# Patient Record
Sex: MALE | Race: WHITE | Employment: OTHER | URBAN - METROPOLITAN AREA
[De-identification: names, ages, dates, MRNs, and addresses within clinical notes are randomized per-mention and may not be internally consistent; named-entity substitution may affect disease eponyms.]

---

## 2010-06-08 LAB — PSA, EXTERNAL: 1.5

## 2017-01-11 DIAGNOSIS — R80.9 PROTEINURIA: Primary | ICD-10-CM

## 2017-01-16 LAB
ALBUMIN MFR UR ELPH: 59.7 %
ALPHA1 GLOB MFR UR ELPH: 1.2 %
ALPHA2 GLOB MFR UR ELPH: 7.3 %
B-GLOBULIN MFR UR ELPH: 13.1 %
GAMMA GLOB MFR UR ELPH: 18.5 %
M PROTEIN MFR UR ELPH: 3.3 %
PLEASE NOTE:, 133800: ABNORMAL
PROT UR-MCNC: 42.4 MG/DL

## 2017-01-31 RX ORDER — FLUTICASONE PROPIONATE 50 MCG
SPRAY, SUSPENSION (ML) NASAL
Qty: 16 G | Refills: 11 | Status: SHIPPED | OUTPATIENT
Start: 2017-01-31 | End: 2017-11-01 | Stop reason: SDUPTHER

## 2017-02-06 ENCOUNTER — OFFICE VISIT (OUTPATIENT)
Dept: ONCOLOGY | Age: 82
End: 2017-02-06

## 2017-02-06 ENCOUNTER — HOSPITAL ENCOUNTER (OUTPATIENT)
Dept: LAB | Age: 82
Discharge: HOME | End: 2017-02-06
Payer: MEDICARE

## 2017-02-06 VITALS
SYSTOLIC BLOOD PRESSURE: 144 MMHG | HEIGHT: 71 IN | DIASTOLIC BLOOD PRESSURE: 75 MMHG | RESPIRATION RATE: 18 BRPM | OXYGEN SATURATION: 97 % | TEMPERATURE: 98.5 F | HEART RATE: 79 BPM | BODY MASS INDEX: 26.07 KG/M2 | WEIGHT: 186.2 LBS

## 2017-02-06 DIAGNOSIS — R77.8 ABNORMAL SERUM PROTEIN ELECTROPHORESIS: ICD-10-CM

## 2017-02-06 DIAGNOSIS — R77.8 ABNORMAL SERUM PROTEIN ELECTROPHORESIS: Primary | ICD-10-CM

## 2017-02-06 PROCEDURE — 83883 ASSAY NEPHELOMETRY NOT SPEC: CPT

## 2017-02-06 PROCEDURE — 85025 COMPLETE CBC W/AUTO DIFF WBC: CPT

## 2017-02-06 PROCEDURE — 82784 ASSAY IGA/IGD/IGG/IGM EACH: CPT

## 2017-02-06 PROCEDURE — 80053 COMPREHEN METABOLIC PANEL: CPT

## 2017-02-06 PROCEDURE — 84156 ASSAY OF PROTEIN URINE: CPT

## 2017-02-06 PROCEDURE — 36415 COLL VENOUS BLD VENIPUNCTURE: CPT

## 2017-02-06 PROCEDURE — 82232 ASSAY OF BETA-2 PROTEIN: CPT

## 2017-02-06 PROCEDURE — 84165 PROTEIN E-PHORESIS SERUM: CPT

## 2017-02-06 NOTE — MR AVS SNAPSHOT
Visit Information Date & Time Provider Department Dept. Phone Encounter #  
 2/6/2017  1:30 PM Jarek Connell MD [address] [place] [address] Oncology at [address] [id number] Your Appointments 6/21/2017  1:00 PM  
ROUTINE CARE with Shelly Queen MD  
[facility] Internal Medicine [facility]) Appt Note: fuv-6m  
 [address] [facility] [address] [facility] [address]  
[phone] [place] [address] Upcoming Health Maintenance Date Due  
 MEDICARE YEARLY EXAM 10/7/2016 GLAUCOMA SCREENING Q2Y 5/24/2018 DTaP/Tdap/Td series (2 - Td) 1/15/2024 Allergies as of 2/6/2017  Review Complete On: 2/6/2017 By: Mike Webb RN Severity Noted Reaction Type Reactions Hydrocodone  07/16/2009    Other (comments) Linda Hubbard Current Immunizations  Reviewed on 2/6/2017 Name Date Hep A Vaccine 2/15/2005 Influenza High Dose Vaccine PF 11/7/2016, 10/7/2015, 10/6/2014, 10/2/2013 Influenza Vaccine Split 10/16/2011 Influenza Vaccine Whole 11/15/2012, 9/21/2009, 10/1/2005 Pneumococcal Conjugate (PCV-13) 10/22/2015 Pneumococcal Vaccine (Unspecified Type) 2/5/2004 Td 1/15/2014 Tdap 1/15/2014 Typhoid Vaccine 2/15/2005 Zoster Vaccine, Live 11/26/2013 Reviewed by Mike Webb RN on 2/6/2017 at  1:23 PM  
You Were Diagnosed With   
  
 Codes Comments Abnormal serum protein electrophoresis    -  Primary ICD-10-CM: R77.8 ICD-9-CM: 790.99 Vitals BP Pulse Temp Resp Height(growth percentile) Weight(growth percentile) 144/75 79 98.5 °F (36.9 °C) 18 5' 11\" (1.803 m) 186 lb 3.2 oz (84.5 kg) SpO2 BMI Smoking Status 97% 25.97 kg/m2 Never Smoker BMI and BSA Data Body Mass Index Body Surface Area  
 25.97 kg/m 2 2.06 m 2 Preferred Pharmacy Pharmacy Name Phone  [id number]- [place], [place] - [address] [address]  Tavares Steiner [phone] Your Updated Medication List  
  
   
This list is accurate as of: 2/6/17  2:15 PM.  Always use your most recent med list.  
  
  
  
  
 colestipol 1 gram tablet Commonly known as:  COLESTID  
TAKE 1 TABLET DAILY  
  
 fluticasone 50 mcg/actuation nasal spray Commonly known as:  Shaun [place] INHALE 2 SPRAYS INTO EACH NOSTRIL DAILY  
  
 levothyroxine 25 mcg tablet Commonly known as:  SYNTHROID  
2  TABLET DAILY BEFORE BREAKFAST, NONE ON SUNDAY- new dose LORazepam 1 mg tablet Commonly known as:  ATIVAN  
TAKE ONE HALF (1/2) TABLET(S) EVERY 8HOURS AS NEEDED FOR ANXIETY. PROSCAR 5 mg tablet Generic drug:  finasteride Take 5 mg by mouth daily (with dinner). To-Do List   
 02/06/2017 Lab:  BETA-2 MICROGLOBULIN   
  
 02/06/2017 Lab:  CBC WITH AUTOMATED DIFF   
  
 02/06/2017 Lab:  FREE LIGHT CHAINS, KAPPA/LAMBDA, QT   
  
 02/06/2017 Lab:  IMMUNOELECTROPHORESIS (IMMUNOFIX.)   
  
 02/06/2017 Lab:  METABOLIC PANEL, COMPREHENSIVE   
  
 02/06/2017 Lab:  PROTEIN ELECT & CHERYL, UR, RANDOM   
  
 02/06/2017 Lab:  PROTEIN ELECTROPHORESIS Introducing [facility]! Dear Aleja : Thank you for requesting a [facility] account. Our records indicate that you already have an active [facility] account. You can access your account anytime at [email or web address] Did you know that you can access your hospital and ER discharge instructions at any time in [facility])? You can also review all of your test results from your hospital stay or ER visit. Additional Information If you have questions, please visit the Frequently Asked Questions section of the [facility] website at [email or web address] Remember, [facility]) is NOT to be used for urgent needs. For medical emergencies, dial 911. Now available from your iPhone and Android! Please provide this summary of care documentation to your next provider. Your primary care clinician is listed as IRENE MARIE. If you have any questions after today's visit, please call [phone].

## 2017-02-06 NOTE — PROGRESS NOTES
Hematology Consult    REASON FOR CONSULT: Concern for paraproteinemia  REQUESTED BY: Dr. Wero Aly: Mr. William Hooks is a 80 y.o. male with anxiety, paroxysmal Afib, hypothyroidism who was found to have an abnormal SPEP done for evaluation of anemia. He was noted to have slowly progressive anemia with a Hb that dropped from 12.2 g/dl in Oct 2013 to 11.2 g/dl in Dec 2016. Also has had mild and variable degrees of thrombocytopenia (130-145K) without any abnormalities in WBCs. His work up included no evidence of iron deficiency, hemolysis and a normal B12 level (349 on 12/21/16). He also developed a decline in his GFR though Cr is still normal at 1.15. Ca was normal at 8.7. SPEP on 12/21/16 showed 0.6 g/dl of M spike, subsequent UPEP showed 3.3 % M protein in the urine. He thus presents to discuss further evaluation. The patient in general has been in his usual state of health. He has had some personal stress lately and has had trouble falling asleep. States that other than that energy is the same, no fevers, chills, night sweats, headaches, new aches, frequent infections (last sinusitis episode requiring antibiotics was 3 years ago). No change in weight or appetite. Denies any CP, SOB, Cough, diarrhea, dysuria, rashes or bleeding. Recently had a superficial melanoma removed from his thigh.       Past Medical History   Diagnosis Date    Arrhythmia      PAT, PVC'S LAST ABOUT A YEAR AGO    Arthritis      OSTEO    BPH 7/16/2009    Cancer ([facility] [address]      basal cell skin cancer, MELANOMA    Cataract      bilateral    Cataract      bilateral    Diarrhea 7/16/2009    Hyperlipidemia 7/16/2009    Hypertension      NOT TREATED    Other ill-defined conditions(799.89)      bph    Other ill-defined conditions(799.89)      colon polyps    Other ill-defined conditions(799.89)      high cholesterol-PT DENIES    Other ill-defined conditions(799.89)      shingles    Other ill-defined conditions(799.89)      raynauds    Palpitations 7/16/2009    Personal history of colonic polyps 7/16/2009    Proteinuria     Psychiatric disorder      ANXIETY AND DEPRESSION    Raynaud disease 7/16/2009    Shingles 7/16/2009    Unspecified sleep apnea      NO CPAP       Past Surgical History   Procedure Laterality Date    Endoscopy, colon, diagnostic       10, due 15    Hx orthopaedic  1997     RIGHT SHOULD ROTATOR CUFF REPAIR    Hx cholecystectomy  1998    Hx colectomy  1987     HEMICOLECTOMY    Hx appendectomy  1987    Hx other surgical       basal cell cancer removed face and ear    Hx cataract removal  02/22/16     left eye & implant    Hx cataract removal  04/25/2016     right eye & implant    Hx heent  fall 2016     skin cancer removal - right lower calf -Dr. Jeani Boxer   Allergen Reactions    Hydrocodone Other (comments)     WEIRD DREAMS       Current Outpatient Prescriptions   Medication Sig Dispense Refill    fluticasone (FLONASE) 50 mcg/actuation nasal spray INHALE 2 SPRAYS INTO EACH NOSTRIL DAILY 16 g 11    levothyroxine (SYNTHROID) 25 mcg tablet 2  TABLET DAILY BEFORE BREAKFAST, NONE ON SUNDAY- new dose 180 Tab 3    colestipol (COLESTID) 1 gram tablet TAKE 1 TABLET DAILY 90 Tab 3    LORazepam (ATIVAN) 1 mg tablet TAKE ONE HALF (1/2) TABLET(S) EVERY 8HOURS AS NEEDED FOR ANXIETY. 30 Tab 0    finasteride (PROSCAR) 5 mg tablet Take 5 mg by mouth daily (with dinner).          Social History     Social History    Marital status:      Spouse name: N/A    Number of children: N/A    Years of education: N/A     Social History Main Topics    Smoking status: Never Smoker    Smokeless tobacco: Never Used    Alcohol use No      Comment: RARELY    Drug use: No    Sexual activity: Not Asked     Other Topics Concern    None     Social History Narrative       Family History   Problem Relation Age of Onset    Stroke Father     Diabetes Brother     Kidney Disease Brother     Cancer Brother      lung    Cancer Mother      BREAST WITH METS TO LUNG    Heart Disease Mother     Lung Disease Sister     Cancer Sister      COLON    Cancer Sister      MELANOMA    Heart Attack Sister            ROS  A 12 point review of systems was obtained and is negative except as listed in HPI. ECOG PS is 1      Physical Examination:   Visit Vitals    /75    Pulse 79    Temp 98.5 °F (36.9 °C)    Resp 18    Ht 5' 11\" (1.803 m)    Wt 186 lb 3.2 oz (84.5 kg)    SpO2 97%    BMI 25.97 kg/m2     General appearance - alert, well appearing, and in no distress  Mental status - oriented to person, place, and time  Mouth - mucous membranes moist, pharynx normal without lesions  Neck - supple, no significant adenopathy  Lymphatics - no palpable lymphadenopathy, no hepatosplenomegaly  Chest - clear to auscultation, no wheezes, rales or rhonchi, symmetric air entry  Heart - normal rate, regular rhythm, normal S1, S2, no murmurs, rubs, clicks or gallops  Abdomen - soft, nontender, nondistended, no masses or organomegaly, bowel sounds present  Back exam - full range of motion, no tenderness, palpable spasm or pain on motion  Neurological - normal speech, no focal findings or movement disorder noted  Musculoskeletal - no joint tenderness, deformity or swelling  Extremities - peripheral pulses normal, no pedal edema, no clubbing or cyanosis  Skin - normal coloration and turgor, no rashes, no suspicious skin lesions noted    LABS  Lab Results   Component Value Date/Time    WBC 5.7 12/14/2016 09:50 AM    HGB 11.2 12/14/2016 09:50 AM    HCT 33.2 12/14/2016 09:50 AM    PLATELET 193 60/11/1593 09:50 AM    MCV 90 12/14/2016 09:50 AM    ABS.  NEUTROPHILS 2.2 12/14/2016 09:50 AM     Lab Results   Component Value Date/Time    Sodium 142 12/14/2016 09:50 AM    Potassium 4.4 12/14/2016 09:50 AM    Chloride 104 12/14/2016 09:50 AM    CO2 25 12/14/2016 09:50 AM    Glucose 92 12/14/2016 09:50 AM    BUN 24 12/14/2016 09:50 AM    Creatinine 1.15 12/14/2016 09:50 AM    GFR est AA 66 12/14/2016 09:50 AM    GFR est non-AA 57 12/14/2016 09:50 AM    Calcium 8.7 12/14/2016 09:50 AM     Lab Results   Component Value Date/Time    AST (SGOT) 18 12/14/2016 09:50 AM    Alk. phosphatase 42 12/14/2016 09:50 AM    Protein, total 6.6 12/21/2016 04:01 PM    Albumin 4.1 12/14/2016 09:50 AM    Globulin 3.4 07/18/2011 06:05 PM    A-G Ratio 1.8 12/14/2016 09:50 AM     Results for Janine Trevino (MRN [id number]) as of 2/6/2017 13:33   Ref. Range 12/21/2016 16:01   Vitamin B12 Latest Ref Range: 211 - 946 pg/mL 349   Iron Latest Ref Range: 38 - 169 ug/dL 62   TIBC Latest Ref Range: 250 - 450 ug/dL 269   Iron % saturation Latest Ref Range: 15 - 55 % 23   UIBC Latest Ref Range: 111 - 343 ug/dL 207     Reviewed SPEP and UPEP      ASSESSMENT  Mr. William Hooks is a 80 y.o. male with  1. Progressive normocytic anemia without iron deficiency, B12 deficiency or hemolysis. Work up revealed a serum M spike of 0.6 g/dl and an abnormal M spike in urine. His labs were noted for declining GFR though creatinine does not meet criteria for end organ damage from plasma cell disorders. Ca is normal. He is asymptomatic. Discussed the various reasons for abnormal protein in blood which could occasionally be due to clonal disorders such as Plasma cell dyscrasias/ Lymphomas and Amyloidosis. Briefly reviewed the spectrum of Plasma cell disorders. We will proceed with further evaluation of this protein following which we may need to pursue additional testing in the form of scans, BM bx , skeletal survey. DISCUSSION AND PLAN  - CBC with diff, CMP  - Beta 2 microglobulin  - SPEP with immunofixation, UPEP with immunofixation and plasma free light chains. RTC 2-3 weeks to discuss results.       Dina Espitia MD

## 2017-02-08 LAB
ALBUMIN 24H MFR UR ELPH: 51.6 %
ALBUMIN SERPL ELPH-MCNC: 4.3 G/DL (ref 2.9–4.4)
ALBUMIN SERPL-MCNC: 4.4 G/DL (ref 3.5–4.7)
ALBUMIN/GLOB SERPL: 1.5 {RATIO} (ref 0.7–1.7)
ALBUMIN/GLOB SERPL: 1.6 {RATIO} (ref 1.1–2.5)
ALP SERPL-CCNC: 46 IU/L (ref 39–117)
ALPHA1 GLOB 24H MFR UR ELPH: 3.5 %
ALPHA1 GLOB SERPL ELPH-MCNC: 0.2 G/DL (ref 0–0.4)
ALPHA2 GLOB 24H MFR UR ELPH: 12.2 %
ALPHA2 GLOB SERPL ELPH-MCNC: 0.5 G/DL (ref 0.4–1)
ALT SERPL-CCNC: 7 IU/L (ref 0–44)
AST SERPL-CCNC: 13 IU/L (ref 0–40)
B-GLOBULIN MFR UR ELPH: 18.9 %
B-GLOBULIN SERPL ELPH-MCNC: 0.8 G/DL (ref 0.7–1.3)
B2 MICROGLOB SERPL-MCNC: 2.7 MG/L (ref 0.6–2.4)
BASOPHILS # BLD AUTO: 0 X10E3/UL (ref 0–0.2)
BASOPHILS NFR BLD AUTO: 0 %
BILIRUB SERPL-MCNC: 0.4 MG/DL (ref 0–1.2)
BUN SERPL-MCNC: 22 MG/DL (ref 8–27)
BUN/CREAT SERPL: 18 (ref 10–22)
CALCIUM SERPL-MCNC: 9.3 MG/DL (ref 8.6–10.2)
CHLORIDE SERPL-SCNC: 102 MMOL/L (ref 96–106)
CO2 SERPL-SCNC: 25 MMOL/L (ref 18–29)
CREAT SERPL-MCNC: 1.23 MG/DL (ref 0.76–1.27)
EOSINOPHIL # BLD AUTO: 0.1 X10E3/UL (ref 0–0.4)
EOSINOPHIL NFR BLD AUTO: 1 %
ERYTHROCYTE [DISTWIDTH] IN BLOOD BY AUTOMATED COUNT: 13.6 % (ref 12.3–15.4)
GAMMA GLOB 24H MFR UR ELPH: 13.9 %
GAMMA GLOB SERPL ELPH-MCNC: 1.4 G/DL (ref 0.4–1.8)
GLOBULIN SER CALC-MCNC: 2.8 G/DL (ref 1.5–4.5)
GLOBULIN SER CALC-MCNC: 2.9 G/DL (ref 2.2–3.9)
GLUCOSE SERPL-MCNC: 95 MG/DL (ref 65–99)
HCT VFR BLD AUTO: 38.4 % (ref 37.5–51)
HGB BLD-MCNC: 12.2 G/DL (ref 12.6–17.7)
IGA SERPL-MCNC: 132 MG/DL (ref 61–437)
IGG SERPL-MCNC: 1312 MG/DL (ref 700–1600)
IGM SERPL-MCNC: 152 MG/DL (ref 15–143)
IMM GRANULOCYTES # BLD: 0 X10E3/UL (ref 0–0.1)
IMM GRANULOCYTES NFR BLD: 1 %
INTERPRETATION UR IFE-IMP: ABNORMAL
KAPPA LC FREE SER-MCNC: 25.59 MG/L (ref 3.3–19.4)
KAPPA LC FREE/LAMBDA FREE SER: 1.62 {RATIO} (ref 0.26–1.65)
LAMBDA LC FREE SERPL-MCNC: 15.84 MG/L (ref 5.71–26.3)
LYMPHOCYTES # BLD AUTO: 1.3 X10E3/UL (ref 0.7–3.1)
LYMPHOCYTES NFR BLD AUTO: 24 %
M PROTEIN 24H MFR UR ELPH: 3 %
M PROTEIN SERPL ELPH-MCNC: 0.6 G/DL
MCH RBC QN AUTO: 29.6 PG (ref 26.6–33)
MCHC RBC AUTO-ENTMCNC: 31.8 G/DL (ref 31.5–35.7)
MCV RBC AUTO: 93 FL (ref 79–97)
MONOCYTES # BLD AUTO: 1.4 X10E3/UL (ref 0.1–0.9)
MONOCYTES NFR BLD AUTO: 26 %
MORPHOLOGY BLD-IMP: ABNORMAL
NEUTROPHILS # BLD AUTO: 2.6 X10E3/UL (ref 1.4–7)
NEUTROPHILS NFR BLD AUTO: 48 %
NOTE, 149533: ABNORMAL
PLATELET # BLD AUTO: 139 X10E3/UL (ref 150–379)
PLEASE NOTE, 011150: ABNORMAL
POTASSIUM SERPL-SCNC: 4.8 MMOL/L (ref 3.5–5.2)
PROT PATTERN SERPL IFE-IMP: ABNORMAL
PROT SERPL-MCNC: 7.2 G/DL (ref 6–8.5)
PROT UR-MCNC: 21.4 MG/DL
RBC # BLD AUTO: 4.12 X10E6/UL (ref 4.14–5.8)
SODIUM SERPL-SCNC: 141 MMOL/L (ref 134–144)
WBC # BLD AUTO: 5.5 X10E3/UL (ref 3.4–10.8)

## 2017-02-09 ENCOUNTER — DOCUMENTATION ONLY (OUTPATIENT)
Dept: ONCOLOGY | Age: 82
End: 2017-02-09

## 2017-02-09 DIAGNOSIS — D89.2 PARAPROTEINEMIA: Primary | ICD-10-CM

## 2017-02-09 NOTE — PROGRESS NOTES
Needs skeletal survey prior to next appt with Dr. Slade Prom. Please reach out and schedule. To Oncology Scheduling.

## 2017-02-09 NOTE — PROGRESS NOTES
Labs reviewed. Has a an abnormal protein in blood (israel knows that). Need to do a skeletal survey (ordered) before he sees me. Please let him know. Thanks!

## 2017-02-13 NOTE — PROGRESS NOTES
Patient needs skeletal survey prior to Dr. Leana Hernandez appt 2/21/17. ML on aptient Vm to return call. Message sent to Oncology Scheduling to have test this week.

## 2017-02-20 ENCOUNTER — TELEPHONE (OUTPATIENT)
Dept: ONCOLOGY | Age: 82
End: 2017-02-20

## 2017-02-20 NOTE — TELEPHONE ENCOUNTER
Spoke with wife, Candice Pinedo verified. They have been out of town. Advised of skeletal survey needed prior to Kenneth appointment. Provided phone number so they can schedule. Once scheduled, wife will reschedule Kenneth appointment. Will cancel Kenneth appointment tomorrow.

## 2017-02-21 ENCOUNTER — HOSPITAL ENCOUNTER (OUTPATIENT)
Dept: GENERAL RADIOLOGY | Age: 82
Discharge: HOME | End: 2017-02-21
Attending: INTERNAL MEDICINE
Payer: MEDICARE

## 2017-02-21 DIAGNOSIS — D89.2 PARAPROTEINEMIA: ICD-10-CM

## 2017-02-21 PROCEDURE — 77075 RADEX OSSEOUS SURVEY COMPL: CPT

## 2017-02-23 ENCOUNTER — OFFICE VISIT (OUTPATIENT)
Dept: ONCOLOGY | Age: 82
End: 2017-02-23

## 2017-02-23 VITALS
TEMPERATURE: 98 F | WEIGHT: 185.6 LBS | RESPIRATION RATE: 16 BRPM | OXYGEN SATURATION: 98 % | DIASTOLIC BLOOD PRESSURE: 72 MMHG | HEART RATE: 80 BPM | HEIGHT: 71 IN | BODY MASS INDEX: 25.98 KG/M2 | SYSTOLIC BLOOD PRESSURE: 148 MMHG

## 2017-02-23 DIAGNOSIS — D47.2 MGUS (MONOCLONAL GAMMOPATHY OF UNKNOWN SIGNIFICANCE): Primary | ICD-10-CM

## 2017-02-23 NOTE — MR AVS SNAPSHOT
Visit Information Date & Time Provider Department Dept. Phone Encounter #  
 2/23/2017 11:00 AM Esteban Kingston MD [facility] Oncology at [address] [id number] Follow-up Instructions Return in about 6 months (around 8/23/2017). Follow-up and Disposition History Your Appointments 6/21/2017  1:00 PM  
ROUTINE CARE with Misha Mills MD  
[address] [place] [address] Internal Medicine [facility]) Appt Note: fuv-6m  
 [address] [facility] [address] [facility] [address]  
[phone] [address] [facility] Upcoming Health Maintenance Date Due  
 MEDICARE YEARLY EXAM 10/7/2016 GLAUCOMA SCREENING Q2Y 5/24/2018 DTaP/Tdap/Td series (2 - Td) 1/15/2024 Allergies as of 2/23/2017  Review Complete On: 2/23/2017 By: Esteban Kingston MD  
  
 Severity Noted Reaction Type Reactions Hydrocodone  07/16/2009    Other (comments) Virl Breath Current Immunizations  Reviewed on 2/23/2017 Name Date Hep A Vaccine 2/15/2005 Influenza High Dose Vaccine PF 11/7/2016, 10/7/2015, 10/6/2014, 10/2/2013 Influenza Vaccine Split 10/16/2011 Influenza Vaccine Whole 11/15/2012, 9/21/2009, 10/1/2005 Pneumococcal Conjugate (PCV-13) 10/22/2015 Pneumococcal Vaccine (Unspecified Type) 2/5/2004 Td 1/15/2014 Tdap 1/15/2014 Typhoid Vaccine 2/15/2005 Zoster Vaccine, Live 11/26/2013 Reviewed by Juan Greene LPN on 2/65/1764 at 65:76 AM  
You Were Diagnosed With   
  
 Codes Comments MGUS (monoclonal gammopathy of unknown significance)    -  Primary ICD-10-CM: M19.2 ICD-9-CM: 273.1 Vitals BP  
  
  
  
  
  
 148/72 Vitals History BMI and BSA Data Body Mass Index Body Surface Area  
 25.89 kg/m 2 2.05 m 2 Preferred Pharmacy Pharmacy Name Phone [id number] [place] [address]. [place] [phone] Your Updated Medication List  
  
   
This list is accurate as of: 2/23/17 11:49 AM.  Always use your most recent med list.  
  
  
  
  
 colestipol 1 gram tablet Commonly known as:  COLESTID  
TAKE 1 TABLET DAILY  
  
 fluticasone 50 mcg/actuation nasal spray Commonly known as:  Thais Fetch INHALE 2 SPRAYS INTO EACH NOSTRIL DAILY  
  
 levothyroxine 25 mcg tablet Commonly known as:  SYNTHROID  
2  TABLET DAILY BEFORE BREAKFAST, NONE ON SUNDAY- new dose LORazepam 1 mg tablet Commonly known as:  ATIVAN  
TAKE ONE HALF (1/2) TABLET(S) EVERY 8HOURS AS NEEDED FOR ANXIETY. PROSCAR 5 mg tablet Generic drug:  finasteride Take 5 mg by mouth daily (with dinner). Follow-up Instructions Return in about 6 months (around 8/23/2017). To-Do List   
 08/23/2017 Lab:  CBC WITH AUTOMATED DIFF   
  
 08/23/2017 Lab:  IMMUNOELECTROPHORESIS (IMMUNOFIX.)   
  
 08/23/2017 Lab:  METABOLIC PANEL, BASIC   
  
 08/23/2017 Lab:  PROTEIN ELECTROPHORESIS Introducing [address]! Dear Amish Sullivan: Thank you for requesting a [employer] account. Our records indicate that you already have an active [employer] account. You can access your account anytime at [email or web address] Did you know that you can access your hospital and ER discharge instructions at any time in [employer]? You can also review all of your test results from your hospital stay or ER visit. Additional Information If you have questions, please visit the Frequently Asked Questions section of the [employer] website at [email or web address] Remember, [employer] is NOT to be used for urgent needs. For medical emergencies, dial 911. Now available from your iPhone and Android! Please provide this summary of care documentation to your next provider. Your primary care clinician is listed as IRENE MARIE.  If you have any questions after today's visit, please call [phone].

## 2017-02-23 NOTE — PROGRESS NOTES
Hematology follow up    REASON FOR CONSULT: MGUS  REQUESTED BY: Dr. Jamila Du: Mr. Irvin  is a 80 y.o. male with anxiety, paroxysmal Afib, hypothyroidism who now returns to discuss the management of newly diagnosed MGUS. He was noted to have slowly progressive anemia with a Hb that dropped from 12.2 g/dl in Oct 2013 to 11.2 g/dl in Dec 2016. Also has had mild and variable degrees of thrombocytopenia (130-145K) without any abnormalities in WBCs. His work up included no evidence of iron deficiency, hemolysis and a normal B12 level (349 on 12/21/16). He also developed a decline in his GFR though Cr is still normal at 1.15. Ca was normal at 8.7. SPEP on 12/21/16 showed 0.6 g/dl of M spike, subsequent UPEP showed 3.3 % M protein in the urine. When seen by me, further work up on 2/6/17 showed an improved Hb of 12.2 g/dl, worsening Cr of 1.23, normal Ca, confirmed a 0.6 g/dl M spike typed as an IgG with lambda light chain specificity, a normal FLC ratio, normal skeletal survey, slightly elevated IgM at 152. The patient in general has been in his usual state of health. Energy is the same, no fevers, chills, night sweats, headaches, new aches, frequent infections (last sinusitis episode requiring antibiotics was 3 years ago). No change in weight or appetite. Denies any CP, SOB, Cough, diarrhea, dysuria, rashes or bleeding. H/O superficial melanoma removed from his thigh.  He has had t take probiotics lately due to some carb intolerance      Past Medical History:   Diagnosis Date    Arrhythmia     PAT, PVC'S LAST ABOUT A YEAR AGO    Arthritis     OSTEO    BPH 7/16/2009    Cancer ([facility] [address]     basal cell skin cancer, MELANOMA    Cataract     bilateral    Cataract     bilateral    Depression 2011    Diarrhea 7/16/2009    Hyperlipidemia 7/16/2009    Hypertension     NOT TREATED    Other ill-defined conditions(799.89)     bph    Other ill-defined conditions(799.89)     colon polyps    Other ill-defined conditions(799.89)     high cholesterol-PT DENIES    Other ill-defined conditions(799.89)     shingles    Other ill-defined conditions(799.89)     raynauds    Palpitations 7/16/2009    Personal history of colonic polyps 7/16/2009    Proteinuria     Psychiatric disorder     ANXIETY AND DEPRESSION    Raynaud disease 7/16/2009    Shingles 7/16/2009    Unspecified sleep apnea     NO CPAP       Past Surgical History:   Procedure Laterality Date    ENDOSCOPY, COLON, DIAGNOSTIC      10, due 15    HX APPENDECTOMY  1987    HX CATARACT REMOVAL  02/22/16    left eye & implant    HX CATARACT REMOVAL  04/25/2016    right eye & implant    HX CHOLECYSTECTOMY  1998    HX COLECTOMY  1987    HEMICOLECTOMY    HX COLONOSCOPY      2012    HX HEENT  fall 2016    skin cancer removal - right lower calf -Dr. Rocael Marcus HX OTHER SURGICAL      basal cell cancer removed face and ear       Allergies   Allergen Reactions    Hydrocodone Other (comments)     WEIRD DREAMS       Current Outpatient Prescriptions   Medication Sig Dispense Refill    fluticasone (FLONASE) 50 mcg/actuation nasal spray INHALE 2 SPRAYS INTO EACH NOSTRIL DAILY 16 g 11    levothyroxine (SYNTHROID) 25 mcg tablet 2  TABLET DAILY BEFORE BREAKFAST, NONE ON SUNDAY- new dose 180 Tab 3    colestipol (COLESTID) 1 gram tablet TAKE 1 TABLET DAILY 90 Tab 3    LORazepam (ATIVAN) 1 mg tablet TAKE ONE HALF (1/2) TABLET(S) EVERY 8HOURS AS NEEDED FOR ANXIETY. 30 Tab 0    finasteride (PROSCAR) 5 mg tablet Take 5 mg by mouth daily (with dinner).          Social History     Social History    Marital status:      Spouse name: N/A    Number of children: N/A    Years of education: N/A     Social History Main Topics    Smoking status: Never Smoker    Smokeless tobacco: Never Used    Alcohol use No      Comment: RARELY    Drug use: No    Sexual activity: Not on file Other Topics Concern    Not on file     Social History Narrative       Family History   Problem Relation Age of Onset    Stroke Father     Diabetes Brother     Kidney Disease Brother     Cancer Brother      lung    Cancer Mother      BREAST WITH METS TO LUNG    Heart Disease Mother     Lung Disease Sister     Cancer Sister      COLON    Cancer Sister      MELANOMA    Heart Attack Sister            ROS  A 12 point review of systems was obtained and is negative except as listed in HPI. ECOG PS is 1      Physical Examination:   Visit Vitals    Ht 5' 11\" (1.803 m)    Wt 185 lb 9.6 oz (84.2 kg)    BMI 25.89 kg/m2     General appearance - alert, well appearing, and in no distress  Mental status - oriented to person, place, and time  Neck - supple, no significant adenopathy  Lymphatics - no palpable lymphadenopathy, no hepatosplenomegaly  Chest - clear to auscultation, no wheezes, rales or rhonchi, symmetric air entry  Heart - normal rate, regular rhythm, normal S1, S2, no murmurs, rubs, clicks or gallops  Abdomen - soft, nontender, nondistended, no masses or organomegaly, bowel sounds present  Back exam - full range of motion, no tenderness, palpable spasm or pain on motion    LABS  Lab Results   Component Value Date/Time    WBC 5.5 02/06/2017 12:00 AM    HGB 12.2 02/06/2017 12:00 AM    HCT 38.4 02/06/2017 12:00 AM    PLATELET 099 07/04/0238 12:00 AM    MCV 93 02/06/2017 12:00 AM    ABS. NEUTROPHILS 2.6 02/06/2017 12:00 AM     Lab Results   Component Value Date/Time    Sodium 141 02/06/2017 12:00 AM    Potassium 4.8 02/06/2017 12:00 AM    Chloride 102 02/06/2017 12:00 AM    CO2 25 02/06/2017 12:00 AM    Glucose 95 02/06/2017 12:00 AM    BUN 22 02/06/2017 12:00 AM    Creatinine 1.23 02/06/2017 12:00 AM    GFR est AA 61 02/06/2017 12:00 AM    GFR est non-AA 52 02/06/2017 12:00 AM    Calcium 9.3 02/06/2017 12:00 AM     Lab Results   Component Value Date/Time    AST (SGOT) 13 02/06/2017 12:00 AM    Alk. phosphatase 46 02/06/2017 12:00 AM    Protein, total 7.2 02/06/2017 12:00 AM    Albumin 4.4 02/06/2017 12:00 AM    Globulin 3.4 07/18/2011 06:05 PM    A-G Ratio 1.6 02/06/2017 12:00 AM     Results for Natasha Gao (MRN [id number]) as of 2/6/2017 13:33   Ref. Range 12/21/2016 16:01   Vitamin B12 Latest Ref Range: 211 - 946 pg/mL 349   Iron Latest Ref Range: 38 - 169 ug/dL 62   TIBC Latest Ref Range: 250 - 450 ug/dL 269   Iron % saturation Latest Ref Range: 15 - 55 % 23   UIBC Latest Ref Range: 111 - 343 ug/dL 207     SPEP, CHERYL, FLC, Bone scan reviewed. Beta 2 MG slightly elevated at 2.7      ASSESSMENT  Mr. Steph Segura is a 80 y.o. male with  1. 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 2. Anemia and renal injury do not meet criteria for Myeloma. Unexplained by his MGUS. Discussed results. He has a low risk IgG MGUS with an estimated risk of progression to active Myeloma of < 5% in 20 years. BM involvement by a significant plasma cell clone highly unlikely and hence exam deferred. We will continue to follow him clinically and consider repeat assessment with any signs of progression.        DISCUSSION AND PLAN  - CBC with diff,BMP, SPEP/CHERYL in 6 months      RTC 6 months      Mohsen Cannon MD

## 2017-06-13 ENCOUNTER — HOSPITAL ENCOUNTER (OUTPATIENT)
Dept: LAB | Age: 82
Discharge: HOME | End: 2017-06-13
Payer: MEDICARE

## 2017-06-13 PROCEDURE — 36415 COLL VENOUS BLD VENIPUNCTURE: CPT

## 2017-06-13 PROCEDURE — 80053 COMPREHEN METABOLIC PANEL: CPT

## 2017-06-13 PROCEDURE — 85025 COMPLETE CBC W/AUTO DIFF WBC: CPT

## 2017-06-13 PROCEDURE — 83036 HEMOGLOBIN GLYCOSYLATED A1C: CPT

## 2017-06-13 PROCEDURE — 84443 ASSAY THYROID STIM HORMONE: CPT

## 2017-06-13 PROCEDURE — 80061 LIPID PANEL: CPT

## 2017-06-21 ENCOUNTER — OFFICE VISIT (OUTPATIENT)
Dept: INTERNAL MEDICINE CLINIC | Age: 82
End: 2017-06-21

## 2017-06-21 VITALS
SYSTOLIC BLOOD PRESSURE: 122 MMHG | HEIGHT: 71 IN | OXYGEN SATURATION: 97 % | WEIGHT: 184.6 LBS | DIASTOLIC BLOOD PRESSURE: 80 MMHG | RESPIRATION RATE: 16 BRPM | BODY MASS INDEX: 25.84 KG/M2 | HEART RATE: 88 BPM | TEMPERATURE: 97.9 F

## 2017-06-21 DIAGNOSIS — R00.2 PALPITATIONS: ICD-10-CM

## 2017-06-21 DIAGNOSIS — E06.3 ACQUIRED AUTOIMMUNE HYPOTHYROIDISM: Primary | ICD-10-CM

## 2017-06-21 DIAGNOSIS — F41.1 ANXIETY STATE: ICD-10-CM

## 2017-06-21 DIAGNOSIS — R73.01 IMPAIRED FASTING GLUCOSE: ICD-10-CM

## 2017-06-21 DIAGNOSIS — N40.1 BENIGN PROSTATIC HYPERPLASIA WITH LOWER URINARY TRACT SYMPTOMS, UNSPECIFIED MORPHOLOGY: ICD-10-CM

## 2017-06-21 RX ORDER — LEVOTHYROXINE SODIUM 25 UG/1
TABLET ORAL
Qty: 1 TAB | Refills: 0
Start: 2017-06-21 | End: 2017-11-01 | Stop reason: SDUPTHER

## 2017-06-21 NOTE — PROGRESS NOTES
HISTORY OF PRESENT ILLNESS  Carlin Contreras is a 80 y.o. male. JAHAIRA Cortez is seen today for follow up of hypothyroidism and other concerns. Anxiety in remission. He is not requiring routine or even prn medication at this time. Autoimmune acquire hypothyroidism. TSH is slightly high. We will add back 25 mcg of Levothyroxine on Sundays. IFG. Stable on current regimen. Reviewed labs fully. MGUS. He is due for hematology follow up in September. Preventive medicine. A Medicare Wellness Visit in four months. MedDATA/gwo      Current Outpatient Prescriptions   Medication Sig    levothyroxine (SYNTHROID) 25 mcg tablet 2  TABLET DAILY BEFORE BREAKFAST, ONE ON SUNDAY- new dose    fluticasone (FLONASE) 50 mcg/actuation nasal spray INHALE 2 SPRAYS INTO EACH NOSTRIL DAILY    colestipol (COLESTID) 1 gram tablet TAKE 1 TABLET DAILY    LORazepam (ATIVAN) 1 mg tablet TAKE ONE HALF (1/2) TABLET(S) EVERY 8HOURS AS NEEDED FOR ANXIETY.  finasteride (PROSCAR) 5 mg tablet Take 5 mg by mouth daily (with dinner). No current facility-administered medications for this visit. Review of Systems   Constitutional: Negative for weight loss. Respiratory: Negative. Cardiovascular: Negative for chest pain, palpitations, leg swelling and PND. Musculoskeletal: Negative for myalgias. Neurological: Negative for focal weakness. Psychiatric/Behavioral: The patient is not nervous/anxious. Physical Exam   Constitutional: No distress. Neck: Carotid bruit is not present. Cardiovascular: Normal rate and regular rhythm. Frequent extrasystoles are present. Exam reveals no gallop and no friction rub. No murmur heard. Pulmonary/Chest: Effort normal and breath sounds normal. No respiratory distress. Musculoskeletal: He exhibits no edema. Nursing note and vitals reviewed. ASSESSMENT and Nelida Frantz was seen today for hypertension and thyroid problem.     Diagnoses and all orders for this visit:    Acquired autoimmune hypothyroidism  -     levothyroxine (SYNTHROID) 25 mcg tablet; 2  TABLET DAILY BEFORE BREAKFAST, ONE ON SUNDAY- new dose  -     TSH    Impaired fasting glucose  -     COMP METABOLIC PANEL  -     HEMOGLOBIN A1C    Benign prostatic hyperplasia with lower urinary tract symptoms, unspecified morphology- See urologist as directed     Anxiety state- Follow off treatment     Palpitations- See cardiologist as directed.

## 2017-06-21 NOTE — MR AVS SNAPSHOT
Visit Information Date & Time Provider Department Dept. Phone Encounter #  
 6/21/2017  1:00 PM Noralyn Gottron, [facility] [phone] [id number] Follow-up Instructions Return in about 4 months (around 10/21/2017) for mwv. Your Appointments 9/19/2017 10:00 AM  
Follow Up with Channing Menendez MD  
[facility] Oncology at [facility] Appt Note: f/u 6 month; f/u 6 month; f/u 6 month [address] [address]  
[phone]  
  
   
 [address] [facility] [address] Upcoming Health Maintenance Date Due  
 MEDICARE YEARLY EXAM 10/7/2016 INFLUENZA AGE 9 TO ADULT 8/1/2017 GLAUCOMA SCREENING Q2Y 5/24/2018 DTaP/Tdap/Td series (2 - Td) 1/15/2024 Allergies as of 6/21/2017  Review Complete On: 6/21/2017 By: Noralyn Gottron, MD  
  
 Severity Noted Reaction Type Reactions Hydrocodone  07/16/2009    Other (comments) Nahum Vail Current Immunizations  Reviewed on 2/23/2017 Name Date Hep A Vaccine 2/15/2005 Influenza High Dose Vaccine PF 11/7/2016, 10/7/2015, 10/6/2014, 10/2/2013 Influenza Vaccine Split 10/16/2011 Influenza Vaccine Whole 11/15/2012, 9/21/2009, 10/1/2005 Pneumococcal Conjugate (PCV-13) 10/22/2015 Pneumococcal Vaccine (Unspecified Type) 2/5/2004 Td 1/15/2014 Tdap 1/15/2014 Typhoid Vaccine 2/15/2005 Zoster Vaccine, Live 11/26/2013 Not reviewed this visit You Were Diagnosed With   
  
 Codes Comments Acquired autoimmune hypothyroidism    -  Primary ICD-10-CM: E03.8 ICD-9-CM: 244.8 Impaired fasting glucose     ICD-10-CM: R73.01 
ICD-9-CM: 790.21 Benign prostatic hyperplasia with lower urinary tract symptoms, unspecified morphology     ICD-10-CM: N40.1 ICD-9-CM: 600.01 Anxiety state     ICD-10-CM: F41.1 ICD-9-CM: 300.00 Palpitations     ICD-10-CM: R00.2 ICD-9-CM: 785.1 Vitals BP Pulse Temp Resp Height(growth percentile) Weight(growth percentile) 122/80 88 97.9 °F (36.6 °C) 16 5' 11\" (1.803 m) 184 lb 9.6 oz (83.7 kg) SpO2 BMI Smoking Status 97% 25.75 kg/m2 Never Smoker BMI and BSA Data Body Mass Index Body Surface Area 25.75 kg/m 2 2.05 m 2 Preferred Pharmacy Pharmacy Name Phone [id number] [address] [phone] Your Updated Medication List  
  
   
This list is accurate as of: 6/21/17  1:32 PM.  Always use your most recent med list.  
  
  
  
  
 colestipol 1 gram tablet Commonly known as:  COLESTID  
TAKE 1 TABLET DAILY  
  
 fluticasone 50 mcg/actuation nasal spray Commonly known as:  Carilyn Mckusick INHALE 2 SPRAYS INTO EACH NOSTRIL DAILY  
  
 levothyroxine 25 mcg tablet Commonly known as:  SYNTHROID  
2  TABLET DAILY BEFORE BREAKFAST, ONE ON SUNDAY- new dose LORazepam 1 mg tablet Commonly known as:  ATIVAN  
TAKE ONE HALF (1/2) TABLET(S) EVERY 8HOURS AS NEEDED FOR ANXIETY. PROSCAR 5 mg tablet Generic drug:  finasteride Take 5 mg by mouth daily (with dinner). We Performed the Following HEMOGLOBIN A1C WITH EAG [59452 CPT(R)] METABOLIC PANEL, COMPREHENSIVE [43056 CPT(R)] TSH 3RD GENERATION [19381 CPT(R)] Follow-up Instructions Return in about 4 months (around 10/21/2017) for mwv. Introducing [facility]! Dear Jerald Aguilarence: Thank you for requesting a ShopKeep POS account. Our records indicate that you already have an active ShopKeep POS account. You can access your account anytime at [email or web address]. [employer] Did you know that you can access your hospital and ER discharge instructions at any time in ShopKeep POS? You can also review all of your test results from your hospital stay or ER visit. Additional Information If you have questions, please visit the Frequently Asked Questions section of the IfOnly website at [email or web address] Remember, IfOnly is NOT to be used for urgent needs. For medical emergencies, dial 911. Now available from your iPhone and Android! Please provide this summary of care documentation to your next provider. Your primary care clinician is listed as IRENE MARIE. If you have any questions after today's visit, please call [phone].

## 2017-08-23 DIAGNOSIS — D47.2 MGUS (MONOCLONAL GAMMOPATHY OF UNKNOWN SIGNIFICANCE): ICD-10-CM

## 2017-09-13 ENCOUNTER — HOSPITAL ENCOUNTER (OUTPATIENT)
Dept: LAB | Age: 82
Discharge: HOME | End: 2017-09-13
Payer: MEDICARE

## 2017-09-13 ENCOUNTER — TELEPHONE (OUTPATIENT)
Dept: ONCOLOGY | Age: 82
End: 2017-09-13

## 2017-09-13 PROCEDURE — 80048 BASIC METABOLIC PNL TOTAL CA: CPT

## 2017-09-13 PROCEDURE — 85025 COMPLETE CBC W/AUTO DIFF WBC: CPT

## 2017-09-13 PROCEDURE — 84165 PROTEIN E-PHORESIS SERUM: CPT

## 2017-09-13 PROCEDURE — 36415 COLL VENOUS BLD VENIPUNCTURE: CPT

## 2017-09-13 PROCEDURE — 82784 ASSAY IGA/IGD/IGG/IGM EACH: CPT

## 2017-09-18 LAB
ALBUMIN SERPL ELPH-MCNC: 4.1 G/DL (ref 2.9–4.4)
ALBUMIN/GLOB SERPL: 1.5 {RATIO} (ref 0.7–1.7)
ALPHA1 GLOB SERPL ELPH-MCNC: 0.2 G/DL (ref 0–0.4)
ALPHA2 GLOB SERPL ELPH-MCNC: 0.5 G/DL (ref 0.4–1)
B-GLOBULIN SERPL ELPH-MCNC: 0.7 G/DL (ref 0.7–1.3)
BASOPHILS # BLD AUTO: 0 X10E3/UL (ref 0–0.2)
BASOPHILS NFR BLD AUTO: 0 %
BUN SERPL-MCNC: 21 MG/DL (ref 8–27)
BUN/CREAT SERPL: 19 (ref 10–24)
CALCIUM SERPL-MCNC: 9.2 MG/DL (ref 8.6–10.2)
CHLORIDE SERPL-SCNC: 103 MMOL/L (ref 96–106)
CO2 SERPL-SCNC: 26 MMOL/L (ref 18–29)
CREAT SERPL-MCNC: 1.09 MG/DL (ref 0.76–1.27)
EOSINOPHIL # BLD AUTO: 0.1 X10E3/UL (ref 0–0.4)
EOSINOPHIL NFR BLD AUTO: 1 %
ERYTHROCYTE [DISTWIDTH] IN BLOOD BY AUTOMATED COUNT: 13.7 % (ref 12.3–15.4)
GAMMA GLOB SERPL ELPH-MCNC: 1.4 G/DL (ref 0.4–1.8)
GLOBULIN SER CALC-MCNC: 2.8 G/DL (ref 2.2–3.9)
GLUCOSE SERPL-MCNC: 86 MG/DL (ref 65–99)
HCT VFR BLD AUTO: 36.5 % (ref 37.5–51)
HGB BLD-MCNC: 12 G/DL (ref 12.6–17.7)
IGA SERPL-MCNC: 129 MG/DL (ref 61–437)
IGG SERPL-MCNC: 1331 MG/DL (ref 700–1600)
IGM SERPL-MCNC: 154 MG/DL (ref 15–143)
IMM GRANULOCYTES # BLD: 0 X10E3/UL (ref 0–0.1)
IMM GRANULOCYTES NFR BLD: 0 %
LYMPHOCYTES # BLD AUTO: 1.4 X10E3/UL (ref 0.7–3.1)
LYMPHOCYTES NFR BLD AUTO: 17 %
M PROTEIN SERPL ELPH-MCNC: 0.6 G/DL
MCH RBC QN AUTO: 29.8 PG (ref 26.6–33)
MCHC RBC AUTO-ENTMCNC: 32.9 G/DL (ref 31.5–35.7)
MCV RBC AUTO: 91 FL (ref 79–97)
MONOCYTES # BLD AUTO: 2.5 X10E3/UL (ref 0.1–0.9)
MONOCYTES NFR BLD AUTO: 31 %
MORPHOLOGY BLD-IMP: ABNORMAL
NEUTROPHILS # BLD AUTO: 4.2 X10E3/UL (ref 1.4–7)
NEUTROPHILS NFR BLD AUTO: 51 %
PLATELET # BLD AUTO: 158 X10E3/UL (ref 150–379)
PLEASE NOTE, 011150: ABNORMAL
POTASSIUM SERPL-SCNC: 4.9 MMOL/L (ref 3.5–5.2)
PROT PATTERN SERPL IFE-IMP: ABNORMAL
PROT SERPL-MCNC: 6.9 G/DL (ref 6–8.5)
RBC # BLD AUTO: 4.03 X10E6/UL (ref 4.14–5.8)
SODIUM SERPL-SCNC: 141 MMOL/L (ref 134–144)
WBC # BLD AUTO: 8.2 X10E3/UL (ref 3.4–10.8)

## 2017-09-19 ENCOUNTER — OFFICE VISIT (OUTPATIENT)
Dept: ONCOLOGY | Age: 82
End: 2017-09-19

## 2017-09-19 VITALS
WEIGHT: 182.6 LBS | HEIGHT: 71 IN | TEMPERATURE: 98.5 F | OXYGEN SATURATION: 98 % | BODY MASS INDEX: 25.56 KG/M2 | DIASTOLIC BLOOD PRESSURE: 68 MMHG | HEART RATE: 83 BPM | RESPIRATION RATE: 16 BRPM | SYSTOLIC BLOOD PRESSURE: 124 MMHG

## 2017-09-19 DIAGNOSIS — D47.2 MGUS (MONOCLONAL GAMMOPATHY OF UNKNOWN SIGNIFICANCE): Primary | ICD-10-CM

## 2017-09-19 NOTE — PROGRESS NOTES
Hematology follow up    REASON FOR VISIT: MGUS  REQUESTED BY: Dr. Arcenio Miranda: Mr. Doris Lott is a 80 y.o. male with anxiety, paroxysmal Afib, hypothyroidism who now returns to discuss the management of newly diagnosed MGUS. He was noted to have slowly progressive anemia with a Hb that dropped from 12.2 g/dl in Oct 2013 to 11.2 g/dl in Dec 2016. Also has had mild and variable degrees of thrombocytopenia (130-145K) without any abnormalities in WBCs. His work up included no evidence of iron deficiency, hemolysis and a normal B12 level (349 on 12/21/16). He also developed a decline in his GFR though Cr is still normal at 1.15. Ca was normal at 8.7. SPEP on 12/21/16 showed 0.6 g/dl of M spike, subsequent UPEP showed 3.3 % M protein in the urine. When seen by me, further work up on 2/6/17 showed an improved Hb of 12.2 g/dl, worsening Cr of 1.23, normal Ca, confirmed a 0.6 g/dl M spike typed as an IgG with lambda light chain specificity, a normal FLC ratio, normal skeletal survey, slightly elevated IgM at 152. He has been on observation. He has been doing well. Energy is the same, no fevers, chills, night sweats, headaches, new aches, frequent infections, No change in weight or appetite. Denies any CP, SOB, Cough, diarrhea, dysuria, rashes or bleeding. H/O superficial melanoma removed from his thigh. He has had to take probiotics lately due to some carb intolerance. He has had stable weight. No persistent and new pain.        Past Medical History:   Diagnosis Date    Arrhythmia     PAT, PVC'S LAST ABOUT A YEAR AGO    Arthritis     OSTEO    BPH 7/16/2009    Cancer ([facility] [address]     basal cell skin cancer, MELANOMA    Cataract     bilateral    Cataract     bilateral    Depression 2011    Diarrhea 7/16/2009    Hyperlipidemia 7/16/2009    Hypertension     NOT TREATED    Other ill-defined conditions     bph    Other ill-defined conditions     colon polyps    Other ill-defined conditions     high cholesterol-PT DENIES    Other ill-defined conditions     shingles    Other ill-defined conditions     raynauds    Palpitations 7/16/2009    Personal history of colonic polyps 7/16/2009    Proteinuria     Psychiatric disorder     ANXIETY AND DEPRESSION    Raynaud disease 7/16/2009    Shingles 7/16/2009    Unspecified sleep apnea     NO CPAP       Past Surgical History:   Procedure Laterality Date    ENDOSCOPY, COLON, DIAGNOSTIC      10, due 15    HX APPENDECTOMY  1987    HX CATARACT REMOVAL  02/22/16    left eye & implant    HX CATARACT REMOVAL  04/25/2016    right eye & implant    HX CHOLECYSTECTOMY  1998    HX COLECTOMY  1987    HEMICOLECTOMY    HX COLONOSCOPY      2012    HX HEENT  fall 2016    skin cancer removal - right lower calf -Dr. Aung Juarez HX OTHER SURGICAL      basal cell cancer removed face and ear       Allergies   Allergen Reactions    Hydrocodone Other (comments)     WEIRD DREAMS       Current Outpatient Prescriptions   Medication Sig Dispense Refill    levothyroxine (SYNTHROID) 25 mcg tablet 2  TABLET DAILY BEFORE BREAKFAST, ONE ON SUNDAY- new dose 1 Tab 0    fluticasone (FLONASE) 50 mcg/actuation nasal spray INHALE 2 SPRAYS INTO EACH NOSTRIL DAILY 16 g 11    colestipol (COLESTID) 1 gram tablet TAKE 1 TABLET DAILY 90 Tab 3    LORazepam (ATIVAN) 1 mg tablet TAKE ONE HALF (1/2) TABLET(S) EVERY 8HOURS AS NEEDED FOR ANXIETY. 30 Tab 0    finasteride (PROSCAR) 5 mg tablet Take 5 mg by mouth daily (with dinner).          Social History     Social History    Marital status:      Spouse name: N/A    Number of children: N/A    Years of education: N/A     Social History Main Topics    Smoking status: Never Smoker    Smokeless tobacco: Never Used    Alcohol use No      Comment: RARELY    Drug use: No    Sexual activity: Not on file     Other Topics Concern    Not on file     Social History Narrative       Family History   Problem Relation Age of Onset    Stroke Father     Diabetes Brother     Kidney Disease Brother     Cancer Brother      lung    Cancer Mother      BREAST WITH METS TO LUNG    Heart Disease Mother     Lung Disease Sister     Cancer Sister      COLON    Cancer Sister      MELANOMA    Heart Attack Sister            ROS  A review of systems was obtained and is negative except as listed in HPI. ECOG PS is 1      Physical Examination:   Visit Vitals    /68    Pulse 83    Temp 98.5 °F (36.9 °C)    Resp 16    Ht 5' 11\" (1.803 m)    Wt 182 lb 9.6 oz (82.8 kg)    SpO2 98%    BMI 25.47 kg/m2     General appearance - alert, well appearing, and in no distress  Mental status - oriented to person, place, and time  Neck - supple, no significant adenopathy  Lymphatics - no palpable lymphadenopathy, no hepatosplenomegaly  Chest - clear to auscultation, no wheezes, rales or rhonchi, symmetric air entry  Heart - normal rate, regular rhythm, normal S1, S2, no murmurs, rubs, clicks or gallops  Abdomen - soft, nontender, nondistended, no masses or organomegaly, bowel sounds present  Back exam - full range of motion, no tenderness, palpable spasm or pain on motion    LABS  Lab Results   Component Value Date/Time    WBC 8.2 09/13/2017 02:14 PM    HGB 12.0 09/13/2017 02:14 PM    HCT 36.5 09/13/2017 02:14 PM    PLATELET 789 32/85/9365 02:14 PM    MCV 91 09/13/2017 02:14 PM    ABS.  NEUTROPHILS 4.2 09/13/2017 02:14 PM     Lab Results   Component Value Date/Time    Sodium 141 09/13/2017 02:14 PM    Potassium 4.9 09/13/2017 02:14 PM    Chloride 103 09/13/2017 02:14 PM    CO2 26 09/13/2017 02:14 PM    Glucose 86 09/13/2017 02:14 PM    BUN 21 09/13/2017 02:14 PM    Creatinine 1.09 09/13/2017 02:14 PM    GFR est AA 70 09/13/2017 02:14 PM    GFR est non-AA 60 09/13/2017 02:14 PM    Calcium 9.2 09/13/2017 02:14 PM     Lab Results   Component Value Date/Time    AST (SGOT) 19 06/13/2017 01:42 PM Alk. phosphatase 45 06/13/2017 01:42 PM    Protein, total 6.9 09/13/2017 02:14 PM    Albumin 4.1 06/13/2017 01:42 PM    Globulin 3.4 07/18/2011 06:05 PM    A-G Ratio 1.5 06/13/2017 01:42 PM     Lab Results   Component Value Date/Time    Iron % saturation 23 12/21/2016 04:01 PM    TIBC 269 12/21/2016 04:01 PM    Vitamin B12 349 12/21/2016 04:01 PM     12/21/2016 04:01 PM    Beta-2 Microglobulin, serum 2.7 02/06/2017 12:00 AM    Sed rate (ESR) 4 08/15/2011 12:00 AM    TSH 5.220 06/13/2017 01:42 PM    M-spike 0.6 09/13/2017 02:14 PM     Lab Results   Component Value Date/Time    INR 1.0 07/18/2011 06:05 PM    aPTT 31.6 07/18/2011 06:05 PM    D-dimer 0.45 07/18/2011 06:05 PM           ASSESSMENT  Mr. Doris Lott is a 80 y.o. male with  1. 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 2. Anemia does not meet criteria for Myeloma. Unexplained by his MGUS. Discussed results. He has a low risk IgG MGUS with an estimated risk of progression to active Myeloma of < 5% in 20 years. BM involvement by a significant plasma cell clone highly unlikely and hence exam deferred. We will continue to follow him clinically and consider repeat assessment with any signs of progression.      He has continued to have no change in his blood counts, IgG is stable    DISCUSSION AND PLAN  - CBC with diff,BMP, SPEP/CHERYL in 6 months  - Discussed flu and Pneumonia vaccination      RTC 6 months      Mireille Oleary MD

## 2017-09-19 NOTE — MR AVS SNAPSHOT
Visit Information Date & Time Provider Department Dept. Phone Encounter #  
 9/19/2017 10:00 AM Omari Vega MD [facility] Oncology at [facility] [phone] Follow-up Instructions Return in about 6 months (around 3/19/2018). Routing History Follow-up and Disposition History Your Appointments 10/23/2017  3:00 PM  
COMPLETE PHYSICAL with Billy Awad MD  
[facility] Internal Medicine [facility]) Appt Note: [address] [facility] [address]  
[address] [place] [address] Upcoming Health Maintenance Date Due  
 MEDICARE YEARLY EXAM 10/7/2016 INFLUENZA AGE 9 TO ADULT 8/1/2017 GLAUCOMA SCREENING Q2Y 5/24/2018 DTaP/Tdap/Td series (2 - Td) 1/15/2024 Allergies as of 9/19/2017  Review Complete On: 9/19/2017 By: Omari Vega MD  
  
 Severity Noted Reaction Type Reactions Hydrocodone  07/16/2009    Other (comments) Maria R Diallo Current Immunizations  Reviewed on 2/23/2017 Name Date Hep A Vaccine 2/15/2005 Influenza High Dose Vaccine PF 11/7/2016, 10/7/2015, 10/6/2014, 10/2/2013 Influenza Vaccine Split 10/16/2011 Influenza Vaccine Whole 11/15/2012, 9/21/2009, 10/1/2005 Pneumococcal Conjugate (PCV-13) 10/22/2015 Td 1/15/2014 Tdap 1/15/2014 Typhoid Vaccine 2/15/2005 ZZZ-RETIRED (DO NOT USE) Pneumococcal Vaccine (Unspecified Type) 2/5/2004 Zoster Vaccine, Live 11/26/2013 Not reviewed this visit You Were Diagnosed With   
  
 Codes Comments MGUS (monoclonal gammopathy of unknown significance)    -  Primary ICD-10-CM: B63.2 ICD-9-CM: 273.1 Vitals BP Pulse Temp Resp Height(growth percentile) Weight(growth percentile) 124/68 83 98.5 °F (36.9 °C) 16 5' 11\" (1.803 m) 182 lb 9.6 oz (82.8 kg) SpO2 BMI Smoking Status 98% 25.47 kg/m2 Never Smoker Vitals History BMI and BSA Data Body Mass Index Body Surface Area  
 25.47 kg/m 2 2.04 m 2 Preferred Pharmacy Pharmacy Name Phone [id number] [facility] [phone] Your Updated Medication List  
  
   
This list is accurate as of: 9/19/17 11:00 AM.  Always use your most recent med list.  
  
  
  
  
 colestipol 1 gram tablet Commonly known as:  COLESTID  
TAKE 1 TABLET DAILY  
  
 fluticasone 50 mcg/actuation nasal spray Commonly known as:  [place] INHALE 2 SPRAYS INTO EACH NOSTRIL DAILY  
  
 levothyroxine 25 mcg tablet Commonly known as:  SYNTHROID  
2  TABLET DAILY BEFORE BREAKFAST, ONE ON SUNDAY- new dose LORazepam 1 mg tablet Commonly known as:  ATIVAN  
TAKE ONE HALF (1/2) TABLET(S) EVERY 8HOURS AS NEEDED FOR ANXIETY. PROSCAR 5 mg tablet Generic drug:  finasteride Take 5 mg by mouth daily (with dinner). We Performed the Following CBC WITH AUTOMATED DIFF [56121 CPT(R)] Follow-up Instructions Return in about 6 months (around 3/19/2018). To-Do List   
 03/19/2018 Lab:  CBC WITH AUTOMATED DIFF   
  
 03/19/2018 Lab:  METABOLIC PANEL, COMPREHENSIVE   
  
 03/19/2018 Lab:  PROTEIN ELECTROPHORESIS Introducing [facility]! Dear Jones Mcmahan: Thank you for requesting a SmartCloud account. Our records indicate that you already have an active SmartCloud account. You can access your account anytime at [email or web address] Did you know that you can access your hospital and ER discharge instructions at any time in SmartCloud? You can also review all of your test results from your hospital stay or ER visit. Additional Information If you have questions, please visit the Frequently Asked Questions section of the SmartCloud website at [email or web address] Remember, SmartCloud is NOT to be used for urgent needs. For medical emergencies, dial 911. Now available from your iPhone and Android! Please provide this summary of care documentation to your next provider. Your primary care clinician is listed as IRENE MARIE. If you have any questions after today's visit, please call [phone].

## 2017-10-25 ENCOUNTER — HOSPITAL ENCOUNTER (OUTPATIENT)
Dept: LAB | Age: 82
Discharge: HOME | End: 2017-10-25
Payer: MEDICARE

## 2017-10-25 PROCEDURE — 84443 ASSAY THYROID STIM HORMONE: CPT

## 2017-10-25 PROCEDURE — 80053 COMPREHEN METABOLIC PANEL: CPT

## 2017-10-25 PROCEDURE — 36415 COLL VENOUS BLD VENIPUNCTURE: CPT

## 2017-10-25 PROCEDURE — 83036 HEMOGLOBIN GLYCOSYLATED A1C: CPT

## 2017-10-26 LAB
ALBUMIN SERPL-MCNC: 3.8 G/DL (ref 3.5–4.7)
ALBUMIN/GLOB SERPL: 1.4 {RATIO} (ref 1.2–2.2)
ALP SERPL-CCNC: 43 IU/L (ref 39–117)
ALT SERPL-CCNC: 10 IU/L (ref 0–44)
AST SERPL-CCNC: 15 IU/L (ref 0–40)
BILIRUB SERPL-MCNC: 0.5 MG/DL (ref 0–1.2)
BUN SERPL-MCNC: 19 MG/DL (ref 8–27)
BUN/CREAT SERPL: 19 (ref 10–24)
CALCIUM SERPL-MCNC: 8.8 MG/DL (ref 8.6–10.2)
CHLORIDE SERPL-SCNC: 107 MMOL/L (ref 96–106)
CO2 SERPL-SCNC: 25 MMOL/L (ref 18–29)
CREAT SERPL-MCNC: 1.01 MG/DL (ref 0.76–1.27)
EST. AVERAGE GLUCOSE BLD GHB EST-MCNC: 120 MG/DL
GFR SERPLBLD CREATININE-BSD FMLA CKD-EPI: 66 ML/MIN/1.73
GFR SERPLBLD CREATININE-BSD FMLA CKD-EPI: 76 ML/MIN/1.73
GLOBULIN SER CALC-MCNC: 2.7 G/DL (ref 1.5–4.5)
GLUCOSE SERPL-MCNC: 92 MG/DL (ref 65–99)
HBA1C MFR BLD: 5.8 % (ref 4.8–5.6)
POTASSIUM SERPL-SCNC: 4.5 MMOL/L (ref 3.5–5.2)
PROT SERPL-MCNC: 6.5 G/DL (ref 6–8.5)
SODIUM SERPL-SCNC: 143 MMOL/L (ref 134–144)
TSH SERPL DL<=0.005 MIU/L-ACNC: 5.73 UIU/ML (ref 0.45–4.5)

## 2017-11-01 ENCOUNTER — OFFICE VISIT (OUTPATIENT)
Dept: INTERNAL MEDICINE CLINIC | Age: 82
End: 2017-11-01

## 2017-11-01 VITALS
WEIGHT: 183.4 LBS | RESPIRATION RATE: 16 BRPM | OXYGEN SATURATION: 95 % | SYSTOLIC BLOOD PRESSURE: 117 MMHG | BODY MASS INDEX: 24.84 KG/M2 | DIASTOLIC BLOOD PRESSURE: 68 MMHG | HEART RATE: 62 BPM | TEMPERATURE: 98.5 F | HEIGHT: 72 IN

## 2017-11-01 DIAGNOSIS — R73.01 IMPAIRED FASTING GLUCOSE: ICD-10-CM

## 2017-11-01 DIAGNOSIS — R09.81 NASAL CONGESTION: ICD-10-CM

## 2017-11-01 DIAGNOSIS — Z98.890 DIARRHEA FOLLOWING GASTROINTESTINAL SURGERY: ICD-10-CM

## 2017-11-01 DIAGNOSIS — F41.1 ANXIETY STATE: ICD-10-CM

## 2017-11-01 DIAGNOSIS — Z23 ENCOUNTER FOR IMMUNIZATION: Primary | ICD-10-CM

## 2017-11-01 DIAGNOSIS — E06.3 ACQUIRED AUTOIMMUNE HYPOTHYROIDISM: ICD-10-CM

## 2017-11-01 DIAGNOSIS — Z13.31 SCREENING FOR DEPRESSION: ICD-10-CM

## 2017-11-01 DIAGNOSIS — E78.5 HYPERLIPIDEMIA, UNSPECIFIED HYPERLIPIDEMIA TYPE: ICD-10-CM

## 2017-11-01 DIAGNOSIS — Z00.00 MEDICARE ANNUAL WELLNESS VISIT, SUBSEQUENT: ICD-10-CM

## 2017-11-01 DIAGNOSIS — R19.7 DIARRHEA FOLLOWING GASTROINTESTINAL SURGERY: ICD-10-CM

## 2017-11-01 RX ORDER — LORAZEPAM 1 MG/1
TABLET ORAL
Qty: 30 TAB | Refills: 0 | Status: SHIPPED | OUTPATIENT
Start: 2017-11-01 | End: 2019-02-14 | Stop reason: SDUPTHER

## 2017-11-01 RX ORDER — MONTELUKAST SODIUM 4 MG/1
TABLET, CHEWABLE ORAL
Qty: 90 TAB | Refills: 3 | Status: SHIPPED | COMMUNITY
Start: 2017-11-01 | End: 2018-11-23 | Stop reason: SDUPTHER

## 2017-11-01 RX ORDER — LEVOTHYROXINE SODIUM 25 UG/1
TABLET ORAL
Qty: 180 TAB | Refills: 3 | Status: SHIPPED | COMMUNITY
Start: 2017-11-01 | End: 2018-05-01 | Stop reason: SDUPTHER

## 2017-11-01 RX ORDER — FLUTICASONE PROPIONATE 50 MCG
SPRAY, SUSPENSION (ML) NASAL
Qty: 3 BOTTLE | Refills: 3 | Status: SHIPPED | COMMUNITY
Start: 2017-11-01 | End: 2018-11-23 | Stop reason: SDUPTHER

## 2017-11-01 NOTE — MR AVS SNAPSHOT
Visit Information Date & Time Provider Department Dept. Phone Encounter #  
 11/1/2017  3:00 PM Car Santillan, [address] [facility] [phone] [id number] Follow-up Instructions Return in about 6 months (around 5/1/2018), or if symptoms worsen or fail to improve. Your Appointments 3/27/2018  2:00 PM  
Follow Up with Deborah Brewster MD  
[facility] On[facility] at [facility] Appt Note: 6 month f/u- MGUS  
 [address] [id number]  
[phone]  
  
   
 [address] [address] Upcoming Health Maintenance Date Due  
 GLAUCOMA SCREENING Q2Y 5/24/2018 MEDICARE YEARLY EXAM 11/2/2018 DTaP/Tdap/Td series (2 - Td) 1/15/2024 Allergies as of 11/1/2017  Review Complete On: 11/1/2017 By: Car Santillan MD  
  
 Severity Noted Reaction Type Reactions Hydrocodone  07/16/2009    Other (comments) Shlomo Sinclair Current Immunizations  Reviewed on 2/23/2017 Name Date Hep A Vaccine 2/15/2005 Influenza High Dose Vaccine PF  Incomplete, 11/7/2016, 10/7/2015, 10/6/2014, 10/2/2013 Influenza Vaccine Split 10/16/2011 Influenza Vaccine Whole 11/15/2012, 9/21/2009, 10/1/2005 Pneumococcal Conjugate (PCV-13) 10/22/2015 Td 1/15/2014 Tdap 1/15/2014 Typhoid Vaccine 2/15/2005 ZZZ-RETIRED (DO NOT USE) Pneumococcal Vaccine (Unspecified Type) 2/5/2004 Zoster Vaccine, Live 11/26/2013 Not reviewed this visit You Were Diagnosed With   
  
 Codes Comments Encounter for immunization    -  Primary ICD-10-CM: L54 ICD-9-CM: V03.89 Acquired autoimmune hypothyroidism     ICD-10-CM: E03.8 ICD-9-CM: 244.8 Hyperlipidemia, unspecified hyperlipidemia type     ICD-10-CM: E78.5 ICD-9-CM: 272.4 Impaired fasting glucose     ICD-10-CM: R73.01 
ICD-9-CM: 790.21 Nasal congestion     ICD-10-CM: R09.81 ICD-9-CM: 478.19 Diarrhea following gastrointestinal surgery     ICD-10-CM: R19.7, F16.297 ICD-9-CM: 564.4 Medicare annual wellness visit, subsequent     ICD-10-CM: Z00.00 ICD-9-CM: V70.0 Screening for depression     ICD-10-CM: Z13.89 ICD-9-CM: V79.0 Vitals BP Pulse Temp Resp Height(growth percentile) Weight(growth percentile)  
 117/68 (BP 1 Location: Right arm, BP Patient Position: Sitting) 62 98.5 °F (36.9 °C) (Oral) 16 5' 11.5\" (1.816 m) 183 lb 6.4 oz (83.2 kg) SpO2 BMI Smoking Status 95% 25.22 kg/m2 Never Smoker BMI and BSA Data Body Mass Index Body Surface Area  
 25.22 kg/m 2 2.05 m 2 Preferred Pharmacy Pharmacy Name Phone [address] [facility] [phone] Your Updated Medication List  
  
   
This list is accurate as of: 17  4:20 PM.  Always use your most recent med list.  
  
  
  
  
 colestipol 1 gram tablet Commonly known as:  COLESTID  
TAKE 1 TABLET DAILY  
  
 fluticasone 50 mcg/actuation nasal spray Commonly known as:  [place] INHALE 2 SPRAYS INTO EACH NOSTRIL DAILY  
  
 levothyroxine 25 mcg tablet Commonly known as:  SYNTHROID  
2  TABLET DAILY BEFORE BREAKFAST- new dose LORazepam 1 mg tablet Commonly known as:  ATIVAN  
TAKE ONE HALF (1/2) TABLET(S) EVERY 8HOURS AS NEEDED FOR ANXIETY. PROSCAR 5 mg tablet Generic drug:  finasteride Take 5 mg by mouth daily (with dinner). Prescriptions Sent to Mail Order Refills  
 levothyroxine (SYNTHROID) 25 mcg tablet 3 Si  TABLET DAILY BEFORE BREAKFAST- new dose Class: Mail Order Pharmacy: [address], [address] [facility] Ph #: [phone]  
 fluticasone [facility]) 50 mcg/actuation nasal spray 3 Sig: INHALE 2 SPRAYS INTO EACH NOSTRIL DAILY Class: Mail Order  Pharmacy: [address], [address] [place] [address] [address] [facility] Ph #: [phone]  
 colestipol (COLESTID) 1 gram tablet 3 Sig: TAKE 1 TABLET DAILY Class: Mail Order Pharmacy: [address], [address] [place] [address] Ph #: [phone] We Performed the Following ADMIN INFLUENZA VIRUS VAC [ HCPCS] [address] [[id number] HCPCS] HEMOGLOBIN A1C WITH EAG [71338 CPT(R)] INFLUENZA VIRUS VACCINE, HIGH DOSE SEASONAL, PRESERVATIVE FREE [45419 CPT(R)] TSH 3RD GENERATION [87082 CPT(R)] Follow-up Instructions Return in about 6 months (around 5/1/2018), or if symptoms worsen or fail to improve. Patient Instructions Medicare Wellness Visit, Male The best way to live healthy is to have a healthy lifestyle by eating a well-balanced diet, exercising regularly, limiting alcohol and stopping smoking. Regular physical exams and screening tests are another way to keep healthy. Preventive exams provided by your health care provider can find health problems before they become diseases or illnesses. Preventive services including immunizations, screening tests, monitoring and exams can help you take care of your own health. All people over age 72 should have a pneumovax  and and a prevnar shot to prevent pneumonia. These are once in a lifetime unless you and your provider decide differently. All people over 65 should have a yearly flu shot and a tetanus vaccine every 10 years. Screening for diabetes mellitus with a blood sugar test should be done every year. Glaucoma is a disease of the eye due to increased ocular pressure that can lead to blindness and it should be done every year by an eye professional. 
 
Cardiovascular screening tests that check for elevated lipids (fatty part of blood) which can lead to heart disease and strokes should be done every 5 years.  
 
Colorectal screening that evaluates for blood or polyps in your colon should be done yearly as a stool test or every five years as a flexible sigmoidoscope or every 10 years as a colonoscopy up to age 76. Men up to age 76 may need a screening blood test for prostate cancer at certain intervals, depending on their personal and family history. This decision is between the patient and his provider. If you have been a smoker or had family history of abdominal aortic aneurysms, you and your provider may decide to schedule an ultrasound test of your aorta. Hepatitis C screening is also recommended for anyone born between [birth year] through [address]. A shingles vaccine is also recommended once in a lifetime after age 61. Your Medicare Wellness Exam is recommended annually. Here is a list of your current Health Maintenance items with a due date: 
Health Maintenance Due Topic Date Due  
 Flu Vaccine  08/01/2017 Introducing [facility] SERVICES! Dear Jonathan Santana: Thank you for requesting a [employer] account. Our records indicate that you already have an active [employer] account. You can access your account anytime at [email or web address] Did you know that you can access your hospital and ER discharge instructions at any time in [employer]? You can also review all of your test results from your hospital stay or ER visit. Additional Information If you have questions, please visit the Frequently Asked Questions section of the [employer] website at [email or web address] Remember, [employer] is NOT to be used for urgent needs. For medical emergencies, dial 911. Now available from your iPhone and Android! Please provide this summary of care documentation to your next provider. Your primary care clinician is listed as IRENE MARIE. If you have any questions after today's visit, please call [phone].

## 2017-11-01 NOTE — PROGRESS NOTES
This is a Subsequent Medicare Annual Wellness Exam (AWV) (Performed 12 months after IPPE or effective date of Medicare Part B enrollment)    I have reviewed the patient's medical history in detail and updated the computerized patient record.      History     Past Medical History:   Diagnosis Date    Arrhythmia     PAT, PVC'S LAST ABOUT A YEAR AGO    Arthritis     OSTEO    BPH 7/16/2009    Cancer ([facility] [address]     basal cell skin cancer, MELANOMA    Cataract     bilateral    Cataract     bilateral    Depression 2011    Diarrhea 7/16/2009    Hyperlipidemia 7/16/2009    Hypertension     NOT TREATED    Other ill-defined conditions(799.89)     bph    Other ill-defined conditions(799.89)     colon polyps    Other ill-defined conditions(799.89)     high cholesterol-PT DENIES    Other ill-defined conditions(799.89)     shingles    Other ill-defined conditions(799.89)     raynauds    Palpitations 7/16/2009    Personal history of colonic polyps 7/16/2009    Proteinuria     Psychiatric disorder     ANXIETY AND DEPRESSION    Raynaud disease 7/16/2009    Shingles 7/16/2009    Unspecified sleep apnea     NO CPAP      Past Surgical History:   Procedure Laterality Date    ENDOSCOPY, COLON, DIAGNOSTIC      10, due 15    HX APPENDECTOMY  1987    HX CATARACT REMOVAL  02/22/16    left eye & implant    HX CATARACT REMOVAL  04/25/2016    right eye & implant    HX CHOLECYSTECTOMY  1998    HX COLECTOMY  1987    HEMICOLECTOMY    HX COLONOSCOPY      2012    HX HEENT  fall 2016    skin cancer removal - right lower calf -Dr. Lane Rees HX OTHER SURGICAL      basal cell cancer removed face and ear     Current Outpatient Prescriptions   Medication Sig Dispense Refill    levothyroxine (SYNTHROID) 25 mcg tablet 2  TABLET DAILY BEFORE BREAKFAST- new dose 180 Tab 3    fluticasone (FLONASE) 50 mcg/actuation nasal spray INHALE 2 SPRAYS INTO EACH NOSTRIL DAILY 3 Bottle 3    colestipol (COLESTID) 1 gram tablet TAKE 1 TABLET DAILY 90 Tab 3    LORazepam (ATIVAN) 1 mg tablet TAKE ONE HALF (1/2) TABLET(S) EVERY 8HOURS AS NEEDED FOR ANXIETY. 30 Tab 0    finasteride (PROSCAR) 5 mg tablet Take 5 mg by mouth daily (with dinner). Allergies   Allergen Reactions    Hydrocodone Other (comments)     WEIRD DREAMS     Family History   Problem Relation Age of Onset    Stroke Father     Diabetes Brother     Kidney Disease Brother     Cancer Brother      lung    Cancer Mother      BREAST WITH METS TO LUNG    Heart Disease Mother     Lung Disease Sister     Cancer Sister      COLON    Cancer Sister      MELANOMA    Heart Attack Sister      Social History   Substance Use Topics    Smoking status: Never Smoker    Smokeless tobacco: Never Used    Alcohol use No      Comment: RARELY     Patient Active Problem List   Diagnosis Code    Personal history of colonic polyps Z86.010    Hyperlipidemia E78.5    Diarrhea R19.7    Shingles B02.9    Raynaud disease I73.00    Palpitations R00.2    Anxiety state F41.1    PVC (premature ventricular contraction) I49.3    Elevated blood pressure reading without diagnosis of hypertension R03.0    BPH (benign prostatic hyperplasia) N40.0    LBBB (left bundle branch block) I44.7    Impaired fasting glucose R73.01    Proteinuria R80.9    Acquired autoimmune hypothyroidism E03.8    Situational depression F43.21    Cataract H26.9    Advance directive on file Z78.9    Diarrhea following gastrointestinal surgery R19.7, Z98.890       Depression Risk Factor Screening:     PHQ over the last two weeks 4/14/2014   Little interest or pleasure in doing things Not at all   Feeling down, depressed or hopeless Not at all   Total Score PHQ 2 0     Alcohol Risk Factor Screening: You do not drink alcohol or very rarely. Functional Ability and Level of Safety:   Hearing Loss  The patient wears hearing aids.     Activities of Daily Living  The home contains: no safety equipment. Patient does total self care    Fall RiskFall Risk Assessment, last 12 mths 11/1/2017   Able to walk? Yes   Fall in past 12 months? No       Abuse Screen  Patient is not abused    Cognitive Screening   Evaluation of Cognitive Function:  Has your family/caregiver stated any concerns about your memory: no  Normal    Patient Care Team   Patient Care Team:  Kirby Eagle MD as PCP - General  Veronica Rios, RN as Ambulatory Care Navigator  Kavita Handley MD (Cardiology)    Assessment/Plan   Education and counseling provided:  Are appropriate based on today's review and evaluation    Diagnoses and all orders for this visit:    1. Encounter for immunization  -     INFLUENZA VIRUS VACCINE, HIGH DOSE SEASONAL, PRESERVATIVE FREE  -     ADMIN INFLUENZA VIRUS VAC    2. Acquired autoimmune hypothyroidism  -     levothyroxine (SYNTHROID) 25 mcg tablet; 2  TABLET DAILY BEFORE BREAKFAST- new dose  -     TSH 3RD GENERATION    3. Hyperlipidemia, unspecified hyperlipidemia type    4. Impaired fasting glucose  -     HEMOGLOBIN A1C WITH EAG    5. Nasal congestion  -     fluticasone (FLONASE) 50 mcg/actuation nasal spray; INHALE 2 SPRAYS INTO EACH NOSTRIL DAILY    6. Diarrhea following gastrointestinal surgery  -     colestipol (COLESTID) 1 gram tablet; TAKE 1 TABLET DAILY    7. Medicare annual wellness visit, subsequent    8.  Screening for depression  -     Depression Screen Annual        Health Maintenance Due   Topic Date Due    INFLUENZA AGE 9 TO ADULT  08/01/2017

## 2017-11-01 NOTE — PROGRESS NOTES
HISTORY OF PRESENT ILLNESS  Morena Velazquez is a 80 y.o. male. HPI sorry    Current Outpatient Prescriptions   Medication Sig    levothyroxine (SYNTHROID) 25 mcg tablet 2  TABLET DAILY BEFORE BREAKFAST- new dose    fluticasone (FLONASE) 50 mcg/actuation nasal spray INHALE 2 SPRAYS INTO EACH NOSTRIL DAILY    colestipol (COLESTID) 1 gram tablet TAKE 1 TABLET DAILY    LORazepam (ATIVAN) 1 mg tablet TAKE ONE HALF (1/2) TABLET(S) EVERY 8HOURS AS NEEDED FOR ANXIETY.  finasteride (PROSCAR) 5 mg tablet Take 5 mg by mouth daily (with dinner). No current facility-administered medications for this visit. Review of Systems   Constitutional: Negative for weight loss. HENT: Positive for congestion. Respiratory: Negative. Cardiovascular: Negative for chest pain, palpitations, leg swelling and PND. Musculoskeletal: Negative for myalgias. Neurological: Negative for focal weakness. Psychiatric/Behavioral: The patient is not nervous/anxious. Physical Exam   Constitutional: He is oriented to person, place, and time. He appears well-developed and well-nourished. No distress. HENT:   Head: Normocephalic and atraumatic. Right Ear: Tympanic membrane, external ear and ear canal normal.   Left Ear: Tympanic membrane, external ear and ear canal normal.   Eyes: EOM are normal. Pupils are equal, round, and reactive to light. Right eye exhibits no discharge. Left eye exhibits no discharge. Neck: Normal range of motion. Neck supple. Carotid bruit is not present. No thyromegaly present. Cardiovascular: Normal rate, regular rhythm, normal heart sounds and intact distal pulses. Exam reveals no gallop and no friction rub. No murmur heard. Pulmonary/Chest: Effort normal and breath sounds normal. No respiratory distress. He has no wheezes. He has no rales. Abdominal: Soft. Bowel sounds are normal. He exhibits no distension and no mass. There is no tenderness. There is no rebound and no guarding. Musculoskeletal: Normal range of motion. He exhibits no edema or tenderness. Lymphadenopathy:     He has no cervical adenopathy. Neurological: He is alert and oriented to person, place, and time. He has normal reflexes. Skin: Skin is warm and dry. No rash noted. Psychiatric: He has a normal mood and affect. His behavior is normal.   Nursing note and vitals reviewed. ASSESSMENT and PLAN  Diagnoses and all orders for this visit:    1. Encounter for immunization  -     INFLUENZA VIRUS VACCINE, HIGH DOSE SEASONAL, PRESERVATIVE FREE  -     ADMIN INFLUENZA VIRUS VAC    2. Acquired autoimmune hypothyroidism  -     levothyroxine (SYNTHROID) 25 mcg tablet; 2  TABLET DAILY BEFORE BREAKFAST- new dose  -     TSH 3RD GENERATION    3. Hyperlipidemia, unspecified hyperlipidemia type- Diet and exercise     4. Impaired fasting glucose  -     HEMOGLOBIN A1C WITH EAG    5. Nasal congestion  -     fluticasone (FLONASE) 50 mcg/actuation nasal spray; INHALE 2 SPRAYS INTO EACH NOSTRIL DAILY    6. Diarrhea following gastrointestinal surgery  -     colestipol (COLESTID) 1 gram tablet; TAKE 1 TABLET DAILY    7. Medicare annual wellness visit, subsequent    8. Screening for depression  -     Depression Screen Annual    9. Anxiety state  -     LORazepam (ATIVAN) 1 mg tablet; TAKE ONE HALF (1/2) TABLET(S) EVERY 8HOURS AS NEEDED FOR ANXIETY.

## 2017-11-01 NOTE — PATIENT INSTRUCTIONS

## 2018-03-19 ENCOUNTER — HOSPITAL ENCOUNTER (OUTPATIENT)
Dept: LAB | Age: 83
Discharge: HOME | End: 2018-03-19
Payer: MEDICARE

## 2018-03-19 DIAGNOSIS — D47.2 MGUS (MONOCLONAL GAMMOPATHY OF UNKNOWN SIGNIFICANCE): ICD-10-CM

## 2018-03-19 PROCEDURE — 80053 COMPREHEN METABOLIC PANEL: CPT

## 2018-03-19 PROCEDURE — 84165 PROTEIN E-PHORESIS SERUM: CPT

## 2018-03-19 PROCEDURE — 85025 COMPLETE CBC W/AUTO DIFF WBC: CPT

## 2018-03-19 PROCEDURE — 36415 COLL VENOUS BLD VENIPUNCTURE: CPT

## 2018-03-21 LAB
ALBUMIN SERPL ELPH-MCNC: 3.9 G/DL (ref 2.9–4.4)
ALBUMIN SERPL-MCNC: 4 G/DL (ref 3.5–4.7)
ALBUMIN/GLOB SERPL: 1.3 {RATIO} (ref 0.7–1.7)
ALBUMIN/GLOB SERPL: 1.4 {RATIO} (ref 1.2–2.2)
ALP SERPL-CCNC: 47 IU/L (ref 39–117)
ALPHA1 GLOB SERPL ELPH-MCNC: 0.2 G/DL (ref 0–0.4)
ALPHA2 GLOB SERPL ELPH-MCNC: 0.5 G/DL (ref 0.4–1)
ALT SERPL-CCNC: 12 IU/L (ref 0–44)
AST SERPL-CCNC: 18 IU/L (ref 0–40)
B-GLOBULIN SERPL ELPH-MCNC: 0.8 G/DL (ref 0.7–1.3)
BASOPHILS # BLD AUTO: 0 X10E3/UL (ref 0–0.2)
BASOPHILS NFR BLD AUTO: 0 %
BILIRUB SERPL-MCNC: 0.3 MG/DL (ref 0–1.2)
BUN SERPL-MCNC: 22 MG/DL (ref 8–27)
BUN/CREAT SERPL: 21 (ref 10–24)
CALCIUM SERPL-MCNC: 8.9 MG/DL (ref 8.6–10.2)
CHLORIDE SERPL-SCNC: 104 MMOL/L (ref 96–106)
CO2 SERPL-SCNC: 22 MMOL/L (ref 18–29)
CREAT SERPL-MCNC: 1.06 MG/DL (ref 0.76–1.27)
EOSINOPHIL # BLD AUTO: 0.1 X10E3/UL (ref 0–0.4)
EOSINOPHIL NFR BLD AUTO: 1 %
ERYTHROCYTE [DISTWIDTH] IN BLOOD BY AUTOMATED COUNT: 14 % (ref 12.3–15.4)
GAMMA GLOB SERPL ELPH-MCNC: 1.4 G/DL (ref 0.4–1.8)
GFR SERPLBLD CREATININE-BSD FMLA CKD-EPI: 62 ML/MIN/1.73
GFR SERPLBLD CREATININE-BSD FMLA CKD-EPI: 72 ML/MIN/1.73
GLOBULIN SER CALC-MCNC: 2.8 G/DL (ref 1.5–4.5)
GLOBULIN SER CALC-MCNC: 2.9 G/DL (ref 2.2–3.9)
GLUCOSE SERPL-MCNC: 130 MG/DL (ref 65–99)
HCT VFR BLD AUTO: 33.7 % (ref 37.5–51)
HGB BLD-MCNC: 11.4 G/DL (ref 13–17.7)
IMM GRANULOCYTES # BLD: 0 X10E3/UL (ref 0–0.1)
IMM GRANULOCYTES NFR BLD: 1 %
LYMPHOCYTES # BLD AUTO: 1.5 X10E3/UL (ref 0.7–3.1)
LYMPHOCYTES NFR BLD AUTO: 25 %
M PROTEIN SERPL ELPH-MCNC: 0.7 G/DL
MCH RBC QN AUTO: 30.2 PG (ref 26.6–33)
MCHC RBC AUTO-ENTMCNC: 33.8 G/DL (ref 31.5–35.7)
MCV RBC AUTO: 89 FL (ref 79–97)
MONOCYTES # BLD AUTO: 1.4 X10E3/UL (ref 0.1–0.9)
MONOCYTES NFR BLD AUTO: 24 %
MORPHOLOGY BLD-IMP: ABNORMAL
NEUTROPHILS # BLD AUTO: 3 X10E3/UL (ref 1.4–7)
NEUTROPHILS NFR BLD AUTO: 49 %
PLATELET # BLD AUTO: 146 X10E3/UL (ref 150–379)
PLEASE NOTE, 011150: ABNORMAL
POTASSIUM SERPL-SCNC: 4.7 MMOL/L (ref 3.5–5.2)
PROT SERPL-MCNC: 6.8 G/DL (ref 6–8.5)
RBC # BLD AUTO: 3.78 X10E6/UL (ref 4.14–5.8)
SODIUM SERPL-SCNC: 143 MMOL/L (ref 134–144)
WBC # BLD AUTO: 6 X10E3/UL (ref 3.4–10.8)

## 2018-04-03 NOTE — TELEPHONE ENCOUNTER
Call to patient to remind him of needed labs prior to 9/19/17 appointment. Had to leave a message. [facility]  Hospitalist Progress Note  Damaso Melara MD 04/02/18  Hospitalist  Pager [phone]  Text Page until 6 pm (after call answering service)        Reason for Stay (Diagnosis): Perforated Colon         Assessment and Plan:      Summary of Stay: Christina Cotto is a 70 year old female with a history of hypertension, anxiety, depression and recurrent diverticulitis who was admitted 3/31/18 in the setting of recent treatment of diverticulitis with worsening abdominal pain and was found to have perforated colon.  She is status post sigmoid colectomy with colostomy formation.    Problem List/Assessment and Plan:     1.  Recurrent diverticulitis with perforation status post sigmoid colectomy and colostomy: Patient has had recurrent episodes of diverticulitis.  She was recently treated with antibiotics.  He had worsening abdominal pain and was ultimately found to have perforation.  She is status post sigmoid colectomy and colostomy on 3/31.  She remains n.p.o. at this time.  -Defer diet, activity and prophylaxis to surgical team (ok for PO meds per surgery)     2.  Hirsutism: Prior to admission the patient was on Spironolactone, this has been resumed.     3.  Anxiety and depression: Prior to admission patient was on Vilazodone, Wellbutrin and as needed Xanax.  These have been resumed.     4.  GERD: PO PPI.     5.  Hypothyroidism: Resume Synthroid.    6. Essential Tremor: Resume Propranolol.    # Pain Assessment:  Current Pain Score 4/3/2018   Patient currently in pain? -   Pain score (0-10) 2   Pain location -   Pain descriptors -   Defer to primary team      DVT Prophylaxis: Defer to primary service  Code Status: Full Code  Discharge Dispo: Home  Estimated Disch Date / # of Days until Disch: Defer to primary team        Interval History (Subjective):      Patient was seen with her  this morning. Doing ok, states pain with movement otherwise none. Denies cp/sob                  Physical  "Exam:      Last Vital Signs:  /52 (BP Location: Left arm)  Pulse 53  Temp 98.2  F (36.8  C) (Oral)  Resp 18  Ht 1.626 m (5' 4\")  Wt 108.9 kg (240 lb 1.6 oz)  SpO2 92%  BMI 41.21 kg/m2    General: Alert, awake, no acute distress.  HEENT: Normocephalic and atraumatic, eyes anicteric and without scleral injection, EOMI, face symmetric, MMM.  Cardiac: RRR, normal S1, S2. No m/g/r. 1+ edema  Pulmonary: Normal chest rise, normal work of breathing.  Lungs CTAB without crackles or wheezing.  Abdomen: soft, non-distended.  Hypoactive bowel sounds, no guarding or rebound tenderness. Ostomy and drain in place.  Extremities: no deformities.  Warm, well perfused.  Skin: no rashes or lesions.  Warm and Dry.  Neuro: No focal deficits.  Speech clear.  Spontaneously moving all extremities in bed.  Psych: Alert and oriented x3. Appropriate affect.          Medications:      All current medications were reviewed with changes reflected in problem list.         Data:      All new lab and imaging data was reviewed.   Labs:    Recent Labs  Lab 04/01/18  0626 03/31/18  1817 03/31/18  1033 03/30/18  1158     --  133 135   POTASSIUM 4.5  --  3.9 3.9   CHLORIDE 106  --  102 102   CO2 26  --  26 25   ANIONGAP 4  --  5 8   *  --  134* 155*   BUN 10  --  12 11   CR 0.79 0.85 0.82 0.80   GFRESTIMATED 72 66 68 71   GFRESTBLACK 87 80 83 86   JESSICA 8.6  --  9.2 9.3       Recent Labs  Lab 04/03/18  0608 04/01/18  0626 03/31/18  1817 03/31/18  1033 03/30/18  1158   WBC  --  19.6*  --  20.8* 24.7*   HGB  --  11.5*  --  13.0 13.5   HCT  --  35.9  --  40.1 40.5   MCV  --  101*  --  100 98    260 256 285 307      Imaging:   No results found for this or any previous visit (from the past 24 hour(s)).           "

## 2018-04-10 ENCOUNTER — OFFICE VISIT (OUTPATIENT)
Dept: ONCOLOGY | Age: 83
End: 2018-04-10

## 2018-04-10 VITALS
RESPIRATION RATE: 20 BRPM | SYSTOLIC BLOOD PRESSURE: 150 MMHG | BODY MASS INDEX: 25.47 KG/M2 | HEART RATE: 76 BPM | OXYGEN SATURATION: 96 % | TEMPERATURE: 98.5 F | HEIGHT: 72 IN | WEIGHT: 188 LBS | DIASTOLIC BLOOD PRESSURE: 79 MMHG

## 2018-04-10 DIAGNOSIS — D47.2 MGUS (MONOCLONAL GAMMOPATHY OF UNKNOWN SIGNIFICANCE): Primary | ICD-10-CM

## 2018-04-10 DIAGNOSIS — R54 FRAIL ELDERLY: ICD-10-CM

## 2018-04-10 RX ORDER — ACETAMINOPHEN 500 MG
TABLET ORAL
COMMUNITY

## 2018-04-10 NOTE — PROGRESS NOTES
Hematology follow up    REASON FOR VISIT: MGUS  REQUESTED BY: Dr. Oswald Quintero: Mr. Elbert Watkins is a 80 y.o. male with anxiety, paroxysmal Afib, hypothyroidism who now returns to follow up for MGUS. He was noted to have slowly progressive anemia with a Hb that dropped from 12.2 g/dl in Oct 2013 to 11.2 g/dl in Dec 2016. Also has had mild and variable degrees of thrombocytopenia (130-145K) without any abnormalities in WBCs. His work up included no evidence of iron deficiency, hemolysis and a normal B12 level (349 on 12/21/16). He also developed a decline in his GFR though Cr is still normal at 1.15. Ca was normal at 8.7. SPEP on 12/21/16 showed 0.6 g/dl of M spike, subsequent UPEP showed 3.3 % M protein in the urine. When seen by me, further work up on 2/6/17 showed an improved Hb of 12.2 g/dl, worsening Cr of 1.23, normal Ca, confirmed a 0.6 g/dl M spike typed as an IgG with lambda light chain specificity, a normal FLC ratio, normal skeletal survey, slightly elevated IgM at 152. He has been on observation. He has been doing well. No fevers, chills, night sweats, headaches, new aches, frequent infections, No change in weight or appetite. Denies any CP, SOB, Cough, diarrhea, dysuria, rashes or bleeding.        Past Medical History:   Diagnosis Date    Arrhythmia     PAT, PVC'S LAST ABOUT A YEAR AGO    Arthritis     OSTEO    BPH 7/16/2009    Cancer ([facility] [address]     basal cell skin cancer, MELANOMA    Cataract     bilateral    Cataract     bilateral    Depression 2011    Diarrhea 7/16/2009    Hyperlipidemia 7/16/2009    Hypertension     NOT TREATED    Other ill-defined conditions(799.89)     bph    Other ill-defined conditions(799.89)     colon polyps    Other ill-defined conditions(799.89)     high cholesterol-PT DENIES    Other ill-defined conditions(799.89)     shingles    Other ill-defined conditions(799.89)     raynauds    Palpitations 7/16/2009    Personal history of colonic polyps 7/16/2009    Proteinuria     Psychiatric disorder     ANXIETY AND DEPRESSION    Raynaud disease 7/16/2009    Shingles 7/16/2009    Unspecified sleep apnea     NO CPAP       Past Surgical History:   Procedure Laterality Date    ENDOSCOPY, COLON, DIAGNOSTIC      10, due 15    HX APPENDECTOMY  1987    HX CATARACT REMOVAL  02/22/16    left eye & implant    HX CATARACT REMOVAL  04/25/2016    right eye & implant    HX CHOLECYSTECTOMY  1998    HX COLECTOMY  1987    HEMICOLECTOMY    HX COLONOSCOPY      2012    HX HEENT  fall 2016    skin cancer removal - right lower calf -Dr. Isra Garcia HX OTHER SURGICAL      basal cell cancer removed face and ear       Allergies   Allergen Reactions    Hydrocodone Other (comments)     WEIRD DREAMS       Current Outpatient Prescriptions   Medication Sig Dispense Refill    levothyroxine (SYNTHROID) 25 mcg tablet 2  TABLET DAILY BEFORE BREAKFAST- new dose 180 Tab 3    fluticasone (FLONASE) 50 mcg/actuation nasal spray INHALE 2 SPRAYS INTO EACH NOSTRIL DAILY 3 Bottle 3    colestipol (COLESTID) 1 gram tablet TAKE 1 TABLET DAILY 90 Tab 3    LORazepam (ATIVAN) 1 mg tablet TAKE ONE HALF (1/2) TABLET(S) EVERY 8HOURS AS NEEDED FOR ANXIETY. 30 Tab 0    finasteride (PROSCAR) 5 mg tablet Take 5 mg by mouth daily (with dinner).          Social History     Social History    Marital status:      Spouse name: N/A    Number of children: N/A    Years of education: N/A     Social History Main Topics    Smoking status: Never Smoker    Smokeless tobacco: Never Used    Alcohol use No      Comment: RARELY    Drug use: No    Sexual activity: Not on file     Other Topics Concern    Not on file     Social History Narrative       Family History   Problem Relation Age of Onset    Stroke Father     Diabetes Brother     Kidney Disease Brother     Cancer Brother      lung    Cancer Mother      BREAST WITH METS TO LUNG    Heart Disease Mother     Lung Disease Sister     Cancer Sister      COLON    Cancer Sister      MELANOMA    Heart Attack Sister            ROS  A review of systems was obtained and is negative except as listed in HPI. ECOG PS is 1      Physical Examination:   Visit Vitals    /79    Pulse 76    Temp 98.5 °F (36.9 °C)    Resp 20    Ht 5' 11.5\" (1.816 m)    Wt 188 lb (85.3 kg)    SpO2 96%    BMI 25.86 kg/m2     General appearance - alert, well appearing, and in no distress  Mental status - oriented to person, place, and time  Neck - supple, no significant adenopathy  Lymphatics - no palpable lymphadenopathy, no hepatosplenomegaly  Chest - clear to auscultation, no wheezes, rales or rhonchi, symmetric air entry  Heart - normal rate, regular rhythm, normal S1, S2, no murmurs, rubs, clicks or gallops  Abdomen - soft, nontender, nondistended, no masses or organomegaly, bowel sounds present  Back exam - full range of motion, no tenderness, palpable spasm or pain on motion    LABS  Lab Results   Component Value Date/Time    WBC 6.0 03/19/2018 04:32 PM    HGB 11.4 (L) 03/19/2018 04:32 PM    HCT 33.7 (L) 03/19/2018 04:32 PM    PLATELET 115 (L) 59/44/6123 04:32 PM    MCV 89 03/19/2018 04:32 PM    ABS. NEUTROPHILS 3.0 03/19/2018 04:32 PM     Lab Results   Component Value Date/Time    Sodium 143 03/19/2018 04:32 PM    Potassium 4.7 03/19/2018 04:32 PM    Chloride 104 03/19/2018 04:32 PM    CO2 22 03/19/2018 04:32 PM    Glucose 130 (H) 03/19/2018 04:32 PM    BUN 22 03/19/2018 04:32 PM    Creatinine 1.06 03/19/2018 04:32 PM    GFR est AA 72 03/19/2018 04:32 PM    GFR est non-AA 62 03/19/2018 04:32 PM    Calcium 8.9 03/19/2018 04:32 PM     Lab Results   Component Value Date/Time    AST (SGOT) 18 03/19/2018 04:32 PM    Alk.  phosphatase 47 03/19/2018 04:32 PM    Protein, total 6.8 03/19/2018 04:32 PM    Albumin 4.0 03/19/2018 04:32 PM    Globulin 3.4 07/18/2011 06:05 PM    A-G Ratio 1.4 03/19/2018 04:32 PM     Lab Results   Component Value Date/Time    Iron % saturation 23 12/21/2016 04:01 PM    TIBC 269 12/21/2016 04:01 PM    Vitamin B12 349 12/21/2016 04:01 PM     12/21/2016 04:01 PM    Beta-2 Microglobulin, serum 2.7 (H) 02/06/2017 12:00 AM    Sed rate (ESR) 4 08/15/2011 12:00 AM    TSH 5.730 (H) 10/25/2017 09:41 AM    M-Ruben 0.7 (H) 03/19/2018 04:32 PM     Lab Results   Component Value Date/Time    INR 1.0 07/18/2011 06:05 PM    aPTT 31.6 07/18/2011 06:05 PM    D-dimer 0.45 07/18/2011 06:05 PM           ASSESSMENT  Mr. Scarlett Cornell is a 80 y.o. male with  1. 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 2. Anemia does not meet criteria for Myeloma. Unexplained by his MGUS. He has a low risk IgG MGUS with an estimated risk of progression to active Myeloma of < 5% in 20 years. BM involvement by a significant plasma cell clone highly unlikely and hence exam deferred. We will continue to follow him clinically and consider repeat assessment with any signs of progression.      He has continued to have no change in his blood counts, IgG is stable    DISCUSSION AND PLAN  - CBC with diff,BMP, SPEP/CHERYL in 12 months      RTC 12 months      Ida Ambrocio MD

## 2018-04-10 NOTE — MR AVS SNAPSHOT
[address] [facility] [address] [place] [address] 
[phone] Patient: Alena Ortiz MRN:  [id number] Visit Information Date & Time Provider Department Dept. Phone Encounter #  
 4/10/2018  3:00 PM Zacarias Geronimo MD [facility] Oncology at [facility] [phone] Follow-up Instructions Return in about 6 months (around 10/10/2018). Routing History Follow-up and Disposition History Your Appointments 2018  1:00 PM  
ROUTINE CARE with Jaime Chavez MD  
[address] [facility] Internal Medicine [facility]) Appt Note: 6-month f/u  
 [address] [facility] [address]  
[address] [id number] [place] [address] Upcoming Health Maintenance Date Due  
 MEDICARE YEARLY EXAM 2018 GLAUCOMA SCREENING Q2Y 2019 DTaP/Tdap/Td series (2 - Td) 1/15/2024 Allergies as of 4/10/2018  Review Complete On: 4/10/2018 By: Zacarias Geronimo MD  
  
 Severity Noted Reaction Type Reactions Hydrocodone  2009    Other (comments) Tana Najera Current Immunizations  Reviewed on 2017 Name Date Hep A Vaccine 2/15/2005 Influenza High Dose Vaccine PF 2017, 2016, 10/7/2015, 10/6/2014, 10/2/2013 Influenza Vaccine Split 10/16/2011 Influenza Vaccine Whole 11/15/2012, 2009, 10/1/2005 Pneumococcal Conjugate (PCV-13) 10/22/2015 Td 1/15/2014 Tdap 1/15/2014 Typhoid Vaccine 2/15/2005 ZZZ-RETIRED (DO NOT USE) Pneumococcal Vaccine (Unspecified Type) 2004 Zoster Vaccine, Live 2013 Not reviewed this visit You Were Diagnosed With   
  
 Codes Comments MGUS (monoclonal gammopathy of unknown significance)    -  Primary ICD-10-CM: F28.8 ICD-9-CM: 273.1 Frail elderly     ICD-10-CM: R54 
ICD-9-CM: 442 Vitals BP Pulse Temp Resp Height(growth percentile) Weight(growth percentile) 150/79 76 98.5 °F (36.9 °C) 20 5' 11.5\" (1.816 m) 188 lb (85.3 kg) SpO2 BMI Smoking Status 96% 25.86 kg/m2 Never Smoker Vitals History BMI and BSA Data Body Mass Index Body Surface Area  
 25.86 kg/m 2 2.07 m 2 Preferred Pharmacy Pharmacy Name Phone Vijaya Snell, [facility] [phone] Your Updated Medication List  
  
   
This list is accurate as of 4/10/18  3:35 PM.  Always use your most recent med list.  
  
  
  
  
 colestipol 1 gram tablet Commonly known as:  COLESTID  
TAKE 1 TABLET DAILY  
  
 fluticasone 50 mcg/actuation nasal spray Commonly known as:  Charity Mealy INHALE 2 SPRAYS INTO EACH NOSTRIL DAILY  
  
 levothyroxine 25 mcg tablet Commonly known as:  SYNTHROID  
2  TABLET DAILY BEFORE BREAKFAST- new dose LORazepam 1 mg tablet Commonly known as:  ATIVAN  
TAKE ONE HALF (1/2) TABLET(S) EVERY 8HOURS AS NEEDED FOR ANXIETY. PROSCAR 5 mg tablet Generic drug:  finasteride Take 5 mg by mouth daily (with dinner). TYLENOL EXTRA STRENGTH 500 mg tablet Generic drug:  acetaminophen Take  by mouth every six (6) hours as needed for Pain. Follow-up Instructions Return in about 6 months (around 10/10/2018). To-Do List   
 10/10/2018 Lab:  CBC WITH AUTOMATED DIFF   
  
 10/10/2018 Lab:  GAMMOPATHY EVAL, SPEP/CHERYL, IG QT/FLC   
  
 10/10/2018 Lab:  METABOLIC PANEL, COMPREHENSIVE [facility]! Dear Rodney Johnston: Thank you for requesting a [employer] account. Our records indicate that you already have an active [employer] account. You can access your account anytime at [email or web address] Did you know that you can access your hospital and ER discharge instructions at any time in [employer]? You can also review all of your test results from your hospital stay or ER visit. Additional Information If you have questions, please visit the Frequently Asked Questions section of the [employer] website at [email or web address] Remember, [employer] is NOT to be used for urgent needs. For medical emergencies, dial 911. Now available from your iPhone and Android! Please provide this summary of care documentation to your next provider. Your primary care clinician is listed as IRENE MARIE. If you have any questions after today's visit, please call [phone].

## 2018-04-24 ENCOUNTER — HOSPITAL ENCOUNTER (OUTPATIENT)
Dept: LAB | Age: 83
Discharge: HOME | End: 2018-04-24
Payer: MEDICARE

## 2018-04-24 PROCEDURE — 36415 COLL VENOUS BLD VENIPUNCTURE: CPT

## 2018-04-24 PROCEDURE — 84443 ASSAY THYROID STIM HORMONE: CPT

## 2018-04-24 PROCEDURE — 83036 HEMOGLOBIN GLYCOSYLATED A1C: CPT

## 2018-04-25 LAB
EST. AVERAGE GLUCOSE BLD GHB EST-MCNC: 111 MG/DL
HBA1C MFR BLD: 5.5 % (ref 4.8–5.6)
TSH SERPL DL<=0.005 MIU/L-ACNC: 5.5 UIU/ML (ref 0.45–4.5)

## 2018-05-01 ENCOUNTER — OFFICE VISIT (OUTPATIENT)
Dept: INTERNAL MEDICINE CLINIC | Age: 83
End: 2018-05-01

## 2018-05-01 VITALS
BODY MASS INDEX: 25.25 KG/M2 | DIASTOLIC BLOOD PRESSURE: 78 MMHG | WEIGHT: 186.4 LBS | TEMPERATURE: 98.1 F | SYSTOLIC BLOOD PRESSURE: 119 MMHG | HEIGHT: 72 IN | OXYGEN SATURATION: 96 % | RESPIRATION RATE: 18 BRPM | HEART RATE: 78 BPM

## 2018-05-01 DIAGNOSIS — D47.2 MGUS (MONOCLONAL GAMMOPATHY OF UNKNOWN SIGNIFICANCE): ICD-10-CM

## 2018-05-01 DIAGNOSIS — E06.3 ACQUIRED AUTOIMMUNE HYPOTHYROIDISM: Primary | ICD-10-CM

## 2018-05-01 DIAGNOSIS — R39.16 BENIGN PROSTATIC HYPERPLASIA (BPH) WITH STRAINING ON URINATION: ICD-10-CM

## 2018-05-01 DIAGNOSIS — R73.01 IMPAIRED FASTING GLUCOSE: ICD-10-CM

## 2018-05-01 DIAGNOSIS — N52.9 ERECTILE DYSFUNCTION, UNSPECIFIED ERECTILE DYSFUNCTION TYPE: ICD-10-CM

## 2018-05-01 DIAGNOSIS — E78.5 HYPERLIPIDEMIA, UNSPECIFIED HYPERLIPIDEMIA TYPE: ICD-10-CM

## 2018-05-01 DIAGNOSIS — N40.1 BENIGN PROSTATIC HYPERPLASIA (BPH) WITH STRAINING ON URINATION: ICD-10-CM

## 2018-05-01 PROBLEM — R54 FRAIL ELDERLY: Status: RESOLVED | Noted: 2018-04-10 | Resolved: 2018-05-01

## 2018-05-01 RX ORDER — SILDENAFIL 50 MG/1
TABLET, FILM COATED ORAL
Qty: 2 TAB | Refills: 0 | Status: SHIPPED | COMMUNITY
Start: 2018-05-01 | End: 2019-05-06

## 2018-05-01 RX ORDER — LEVOTHYROXINE SODIUM 75 UG/1
TABLET ORAL
Qty: 90 TAB | Refills: 3 | Status: SHIPPED | COMMUNITY
Start: 2018-05-01 | End: 2019-05-13

## 2018-05-01 NOTE — MR AVS SNAPSHOT
[address] [place] [address] 
[phone] Patient: Gonzalo Banuelos MRN:  [id number] Visit Information Date & Time Provider Department Dept. Phone Encounter #  
 2018  1:00 PM Atif Short, [facility] Internal Medicine [phone] [id number] Follow-up Instructions Return in about 6 months (around 2018) for mwv. Upcoming Health Maintenance Date Due Influenza Age 5 to Adult 2018 MEDICARE YEARLY EXAM 2018 GLAUCOMA SCREENING Q2Y 2019 DTaP/Tdap/Td series (2 - Td) 1/15/2024 Allergies as of 2018  Review Complete On: 2018 By: Atif Short MD  
  
 Severity Noted Reaction Type Reactions Hydrocodone  2009    Other (comments) Arthur Addison Current Immunizations  Reviewed on 2017 Name Date Hep A Vaccine 2/15/2005 Influenza High Dose Vaccine PF 2017, 2016, 10/7/2015, 10/6/2014, 10/2/2013 Influenza Vaccine Split 10/16/2011 Influenza Vaccine Whole 11/15/2012, 2009, 10/1/2005 Pneumococcal Conjugate (PCV-13) 10/22/2015 Td 1/15/2014 Tdap 1/15/2014 Typhoid Vaccine 2/15/2005 ZZZ-RETIRED (DO NOT USE) Pneumococcal Vaccine (Unspecified Type) 2004 Zoster Vaccine, Live 2013 Not reviewed this visit You Were Diagnosed With   
  
 Codes Comments Acquired autoimmune hypothyroidism    -  Primary ICD-10-CM: E06.3 ICD-9-CM: 244.8 Hyperlipidemia, unspecified hyperlipidemia type     ICD-10-CM: E78.5 ICD-9-CM: 272.4 Benign prostatic hyperplasia (BPH) with straining on urination     ICD-10-CM: N40.1, R39.16 ICD-9-CM: 600.01, 788.65 Impaired fasting glucose     ICD-10-CM: R73.01 
ICD-9-CM: 790.21   
 MGUS (monoclonal gammopathy of unknown significance)     ICD-10-CM: F30.7 ICD-9-CM: 273.1 Erectile dysfunction, unspecified erectile dysfunction type     ICD-10-CM: N52.9 ICD-9-CM: 607.84 Vitals BP Pulse Temp Resp Height(growth percentile) Weight(growth percentile) 119/78 (BP 1 Location: Left arm, BP Patient Position: Sitting) 78 98.1 °F (36.7 °C) (Oral) 18 5' 11.5\" (1.816 m) 186 lb 6.4 oz (84.6 kg) SpO2 BMI Smoking Status 96% 25.64 kg/m2 Never Smoker Vitals History BMI and BSA Data Body Mass Index Body Surface Area  
 25.64 kg/m 2 2.07 m 2 Preferred Pharmacy Pharmacy Name Phone Vijaya Snell, [facility] [phone] Your Updated Medication List  
  
   
This list is accurate as of 18  1:45 PM.  Always use your most recent med list.  
  
  
  
  
 colestipol 1 gram tablet Commonly known as:  COLESTID  
TAKE 1 TABLET DAILY  
  
 fluticasone 50 mcg/actuation nasal spray Commonly known as:  Verito Hoot INHALE 2 SPRAYS INTO EACH NOSTRIL DAILY  
  
 levothyroxine 75 mcg tablet Commonly known as:  SYNTHROID  
1 TABLET DAILY BEFORE BREAKFAST, skip - new dose LORazepam 1 mg tablet Commonly known as:  ATIVAN  
TAKE ONE HALF (1/2) TABLET(S) EVERY 8HOURS AS NEEDED FOR ANXIETY. PROSCAR 5 mg tablet Generic drug:  finasteride Take 5 mg by mouth daily (with dinner). sildenafil citrate 50 mg tablet Commonly known as:  VIAGRA Take 1/2 tab daily as needed for ED. TYLENOL EXTRA STRENGTH 500 mg tablet Generic drug:  acetaminophen Take  by mouth every six (6) hours as needed for Pain. Prescriptions Sent to Mail Order Refills  
 levothyroxine (SYNTHROID) 75 mcg tablet 3 Si TABLET DAILY BEFORE BREAKFAST, skip - new dose Class: Mail Order Pharmacy: [address] [place] [address], [address] [place] [address] #: [phone] We Performed the Following CBC WITH AUTOMATED DIFF [04924 CPT(R)] HEMOGLOBIN A1C WITH EAG [81080 CPT(R)] LIPID PANEL [09705 CPT(R)] METABOLIC PANEL, COMPREHENSIVE [41427 CPT(R)] TSH 3RD GENERATION [28724 CPT(R)] Follow-up Instructions Return in about 6 months (around 11/1/2018) for mwv. Introducing [facility] SERVICES! Dear Courtney Menendez: Thank you for requesting a [employer] account. Our records indicate that you already have an active [employer] account. You can access your account anytime at [email or web address] Did you know that you can access your hospital and ER discharge instructions at any time in [employer]? You can also review all of your test results from your hospital stay or ER visit. Additional Information If you have questions, please visit the Frequently Asked Questions section of the [employer] website at [email or web address] Remember, [employer] is NOT to be used for urgent needs. For medical emergencies, dial 911. Now available from your iPhone and Android! Please provide this summary of care documentation to your next provider. Your primary care clinician is listed as IRENE MARIE. If you have any questions after today's visit, please call [phone].

## 2018-05-01 NOTE — PROGRESS NOTES
HISTORY OF PRESENT ILLNESS  Alena Ortiz is a 80 y.o. male. HPI Griffin Nazario is seen today for follow up of anxiety and other problems. 1.  Hypothyroidism, IFG. Reviewed labs, stable. 2.  Anxiety. He is doing better. He is on low maintenance therapy and has not even required prn Ativan for some time. He is congratulated on his stable status. 3.  BPH. Up to date with urology follow up. 4.  MGUS. Up to date with hematology follow up. 5.  Preventive medicine. Medicine Wellness Visit in 6 months. Review of Systems:  Notable for ED. He wonders about a trial of Viagra. He has used it in the past. He is counseled regarding medication side effects, goals of treatment. Samples are provided and he will let me know if he would like a prescription. Current Outpatient Prescriptions   Medication Sig    levothyroxine (SYNTHROID) 75 mcg tablet 1 TABLET DAILY BEFORE BREAKFAST, skip Sunday- new dose    sildenafil citrate (VIAGRA) 50 mg tablet Take 1/2 tab daily as needed for ED.  acetaminophen (TYLENOL EXTRA STRENGTH) 500 mg tablet Take  by mouth every six (6) hours as needed for Pain.  fluticasone (FLONASE) 50 mcg/actuation nasal spray INHALE 2 SPRAYS INTO EACH NOSTRIL DAILY    colestipol (COLESTID) 1 gram tablet TAKE 1 TABLET DAILY    LORazepam (ATIVAN) 1 mg tablet TAKE ONE HALF (1/2) TABLET(S) EVERY 8HOURS AS NEEDED FOR ANXIETY.  finasteride (PROSCAR) 5 mg tablet Take 5 mg by mouth daily (with dinner). No current facility-administered medications for this visit. Review of Systems   Constitutional: Negative for weight loss. Respiratory: Negative. Cardiovascular: Negative for chest pain, palpitations, leg swelling and PND. Musculoskeletal: Negative for myalgias. Neurological: Negative for focal weakness. Psychiatric/Behavioral: The patient is not nervous/anxious. Physical Exam   Constitutional: No distress. Neck: Carotid bruit is not present.    Cardiovascular: Normal rate and regular rhythm. Exam reveals no gallop and no friction rub. No murmur heard. Pulmonary/Chest: Effort normal and breath sounds normal. No respiratory distress. Musculoskeletal: He exhibits no edema. Nursing note and vitals reviewed. ASSESSMENT and PLAN  Diagnoses and all orders for this visit:    1. Acquired autoimmune hypothyroidism  -     levothyroxine (SYNTHROID) 75 mcg tablet; 1 TABLET DAILY BEFORE BREAKFAST, skip Sunday- new dose    2. Hyperlipidemia, unspecified hyperlipidemia type  -     TSH 3RD GENERATION  -     CBC WITH AUTOMATED DIFF  -     LIPID PANEL    3. Benign prostatic hyperplasia (BPH) with straining on urination- See urologist as directed     4. Impaired fasting glucose  -     HEMOGLOBIN A1C WITH EAG  -     METABOLIC PANEL, COMPREHENSIVE    5. MGUS (monoclonal gammopathy of unknown significance)- See hematologist as directed     6. Erectile dysfunction, unspecified erectile dysfunction type  -     sildenafil citrate (VIAGRA) 50 mg tablet; Take 1/2 tab daily as needed for ED.

## 2018-10-10 DIAGNOSIS — D47.2 MGUS (MONOCLONAL GAMMOPATHY OF UNKNOWN SIGNIFICANCE): ICD-10-CM

## 2018-10-31 LAB
ALBUMIN SERPL-MCNC: 4 G/DL (ref 3.5–4.7)
ALBUMIN/GLOB SERPL: 1.6 {RATIO} (ref 1.2–2.2)
ALP SERPL-CCNC: 48 IU/L (ref 39–117)
ALT SERPL-CCNC: 11 IU/L (ref 0–44)
AST SERPL-CCNC: 14 IU/L (ref 0–40)
BASOPHILS # BLD AUTO: 0 X10E3/UL (ref 0–0.2)
BASOPHILS NFR BLD AUTO: 0 %
BILIRUB SERPL-MCNC: 0.5 MG/DL (ref 0–1.2)
BUN SERPL-MCNC: 15 MG/DL (ref 8–27)
BUN/CREAT SERPL: 14 (ref 10–24)
CALCIUM SERPL-MCNC: 8.8 MG/DL (ref 8.6–10.2)
CHLORIDE SERPL-SCNC: 103 MMOL/L (ref 96–106)
CHOLEST SERPL-MCNC: 114 MG/DL (ref 100–199)
CO2 SERPL-SCNC: 24 MMOL/L (ref 20–29)
CREAT SERPL-MCNC: 1.04 MG/DL (ref 0.76–1.27)
EOSINOPHIL # BLD AUTO: 0 X10E3/UL (ref 0–0.4)
EOSINOPHIL NFR BLD AUTO: 0 %
ERYTHROCYTE [DISTWIDTH] IN BLOOD BY AUTOMATED COUNT: 13.8 % (ref 12.3–15.4)
EST. AVERAGE GLUCOSE BLD GHB EST-MCNC: 108 MG/DL
GLOBULIN SER CALC-MCNC: 2.5 G/DL (ref 1.5–4.5)
GLUCOSE SERPL-MCNC: 94 MG/DL (ref 65–99)
HBA1C MFR BLD: 5.4 % (ref 4.8–5.6)
HCT VFR BLD AUTO: 34.9 % (ref 37.5–51)
HDLC SERPL-MCNC: 55 MG/DL
HGB BLD-MCNC: 11.5 G/DL (ref 13–17.7)
LDLC SERPL CALC-MCNC: 42 MG/DL (ref 0–99)
LYMPHOCYTES # BLD AUTO: 2.2 X10E3/UL (ref 0.7–3.1)
LYMPHOCYTES NFR BLD AUTO: 17 %
MCH RBC QN AUTO: 29.8 PG (ref 26.6–33)
MCHC RBC AUTO-ENTMCNC: 33 G/DL (ref 31.5–35.7)
MCV RBC AUTO: 90 FL (ref 79–97)
MONOCYTES # BLD AUTO: 3 X10E3/UL (ref 0.1–0.9)
MONOCYTES NFR BLD AUTO: 23 %
MORPHOLOGY BLD-IMP: ABNORMAL
NEUTROPHILS # BLD AUTO: 7.9 X10E3/UL (ref 1.4–7)
NEUTROPHILS NFR BLD AUTO: 60 %
PLATELET # BLD AUTO: 155 X10E3/UL (ref 150–379)
POTASSIUM SERPL-SCNC: 4.5 MMOL/L (ref 3.5–5.2)
PROT SERPL-MCNC: 6.5 G/DL (ref 6–8.5)
RBC # BLD AUTO: 3.86 X10E6/UL (ref 4.14–5.8)
SODIUM SERPL-SCNC: 138 MMOL/L (ref 134–144)
TRIGL SERPL-MCNC: 84 MG/DL (ref 0–149)
TSH SERPL DL<=0.005 MIU/L-ACNC: 3.51 UIU/ML (ref 0.45–4.5)
VLDLC SERPL CALC-MCNC: 17 MG/DL (ref 5–40)
WBC # BLD AUTO: 13.1 X10E3/UL (ref 3.4–10.8)

## 2018-11-05 ENCOUNTER — OFFICE VISIT (OUTPATIENT)
Dept: INTERNAL MEDICINE CLINIC | Age: 83
End: 2018-11-05

## 2018-11-05 VITALS
HEART RATE: 58 BPM | DIASTOLIC BLOOD PRESSURE: 64 MMHG | HEIGHT: 72 IN | SYSTOLIC BLOOD PRESSURE: 116 MMHG | WEIGHT: 175 LBS | BODY MASS INDEX: 23.7 KG/M2 | TEMPERATURE: 98.1 F | OXYGEN SATURATION: 96 % | RESPIRATION RATE: 16 BRPM

## 2018-11-05 DIAGNOSIS — Z00.00 MEDICARE ANNUAL WELLNESS VISIT, SUBSEQUENT: Primary | ICD-10-CM

## 2018-11-05 DIAGNOSIS — Z23 ENCOUNTER FOR IMMUNIZATION: ICD-10-CM

## 2018-11-05 DIAGNOSIS — E03.9 ACQUIRED HYPOTHYROIDISM: ICD-10-CM

## 2018-11-05 DIAGNOSIS — Z13.31 SCREENING FOR DEPRESSION: ICD-10-CM

## 2018-11-05 DIAGNOSIS — N40.1 BENIGN PROSTATIC HYPERPLASIA (BPH) WITH STRAINING ON URINATION: ICD-10-CM

## 2018-11-05 DIAGNOSIS — R39.16 BENIGN PROSTATIC HYPERPLASIA (BPH) WITH STRAINING ON URINATION: ICD-10-CM

## 2018-11-05 DIAGNOSIS — E78.00 ELEVATED LDL CHOLESTEROL LEVEL: ICD-10-CM

## 2018-11-05 DIAGNOSIS — R73.01 IFG (IMPAIRED FASTING GLUCOSE): ICD-10-CM

## 2018-11-05 RX ORDER — MIRABEGRON 25 MG/1
TABLET, FILM COATED, EXTENDED RELEASE ORAL
COMMUNITY
Start: 2018-10-31 | End: 2019-05-06 | Stop reason: ALTCHOICE

## 2018-11-05 NOTE — PROGRESS NOTES
This is the Subsequent Medicare Annual Wellness Exam, performed 12 months or more after the Initial AWV or the last Subsequent AWV I have reviewed the patient's medical history in detail and updated the computerized patient record. History Past Medical History:  
Diagnosis Date  Arrhythmia PAT, PVC'S LAST ABOUT A YEAR AGO  Arthritis OSTEO  
 BPH 7/16/2009  Cancer ([facility] [address]   
 basal cell skin cancer, MELANOMA  Cataract   
 bilateral  
 Cataract   
 bilateral  
 Cellulitis 10/15/2018  
 left hand  Depression 2011  Diarrhea 7/16/2009  Hyperlipidemia 7/16/2009  Hypertension NOT TREATED  Other ill-defined conditions(799.89)   
 bph  
 Other ill-defined conditions(799.89)   
 colon polyps  Other ill-defined conditions(799.89)   
 high cholesterol-PT DENIES  Other ill-defined conditions(799.89)   
 shingles  Other ill-defined conditions(799.89) raynauds  Palpitations 7/16/2009  Personal history of colonic polyps 7/16/2009  Proteinuria  Psychiatric disorder ANXIETY AND DEPRESSION  
 Raynaud disease 7/16/2009  Shingles 7/16/2009  
 Skin lesions 10/2018  
 lesions removed by DERM from face and ear  Unspecified sleep apnea NO CPAP Past Surgical History:  
Procedure Laterality Date  ENDOSCOPY, COLON, DIAGNOSTIC    
 10, due [address]  HX CATARACT REMOVAL  02/22/16  
 left eye & implant  HX CATARACT REMOVAL  04/25/2016  
 right eye & implant [facility] [address] HEMICOLECTOMY  HX COLONOSCOPY    
 2012  HX HEENT  fall 2016  
 skin cancer removal - right lower calf -Dr. Sammy Saunders [address] RIGHT SHOULD ROTATOR CUFF REPAIR  
 HX OTHER SURGICAL    
 basal cell cancer removed face and ear Current Outpatient Medications Medication Sig Dispense Refill  MYRBETRIQ 25 mg ER tablet  levothyroxine (SYNTHROID) 75 mcg tablet 1 TABLET DAILY BEFORE BREAKFAST, skip Sunday- new dose 90 Tab 3  
 sildenafil citrate (VIAGRA) 50 mg tablet Take 1/2 tab daily as needed for ED. 2 Tab 0  
 acetaminophen (TYLENOL EXTRA STRENGTH) 500 mg tablet Take  by mouth every six (6) hours as needed for Pain.  fluticasone (FLONASE) 50 mcg/actuation nasal spray INHALE 2 SPRAYS INTO EACH NOSTRIL DAILY 3 Bottle 3  
 colestipol (COLESTID) 1 gram tablet TAKE 1 TABLET DAILY 90 Tab 3  
 LORazepam (ATIVAN) 1 mg tablet TAKE ONE HALF (1/2) TABLET(S) EVERY 8HOURS AS NEEDED FOR ANXIETY. 30 Tab 0  
 finasteride (PROSCAR) 5 mg tablet Take 5 mg by mouth daily (with dinner). Allergies Allergen Reactions  Hydrocodone Other (comments) Cherylyn Ganser Family History Problem Relation Age of Onset  Stroke Father  Diabetes Brother  Kidney Disease Brother  Cancer Brother   
     lung  Cancer Mother BREAST WITH METS TO LUNG  
 Heart Disease Mother  Lung Disease Sister  Cancer Sister COLON  
 Cancer Sister MELANOMA  Heart Attack Sister Social History Tobacco Use  Smoking status: Never Smoker  Smokeless tobacco: Never Used Substance Use Topics  Alcohol use: No  
  Alcohol/week: 0.0 oz  
  Comment: RARELY Patient Active Problem List  
Diagnosis Code  Personal history of colonic polyps Z86.010  
 Hyperlipidemia E78.5  Diarrhea R19.7  Shingles B02.9  Raynaud disease I73.00  Palpitations R00.2  Anxiety state F41.1  PVC (premature ventricular contraction) I49.3  Elevated blood pressure reading without diagnosis of hypertension R03.0  BPH (benign prostatic hyperplasia) N40.0  LBBB (left bundle branch block) I44.7  Impaired fasting glucose R73.01  
 Proteinuria R80.9  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  Situational depression F43.21  
 Cataract H26.9  Advance directive on file X96.3  Diarrhea following gastrointestinal surgery R19.7, Z98.890  
 MGUS (monoclonal gammopathy of unknown significance) D47.2 Depression Risk Factor Screening: PHQ over the last two weeks 4/14/2014 Little interest or pleasure in doing things Not at all Feeling down, depressed, irritable, or hopeless Not at all Total Score PHQ 2 0 Alcohol Risk Factor Screening: You do not drink alcohol or very rarely. Functional Ability and Level of Safety:  
Hearing Loss The patient wears hearing aids. Activities of Daily Living The home contains: no safety equipment. Patient does total self care Fall Risk Fall Risk Assessment, last 12 mths 5/1/2018 Able to walk? Yes Fall in past 12 months? No  
 
 
Abuse Screen Patient is not abused Cognitive Screening Evaluation of Cognitive Function: 
Has your family/caregiver stated any concerns about your memory: no 
Normal 
 
Patient Care Team  
Patient Care Team: 
Avinash Montejo MD as PCP - General 
Madison Caldwell RN as Ambulatory Care Navigator Madisyn Guzman MD (Cardiology) Rosita Mcnamara DDS as Physician (Dental General Practice) Megan Menon MD as Physician (Ophthalmology) Cristin Loewry MD as Physician (Dermatology) Mary Tolentino MD as Physician (Urology) Gilford Poster, MD as Physician (Hematology and Oncology) Assessment/Plan Education and counseling provided: 
Are appropriate based on today's review and evaluation Diagnoses and all orders for this visit: 
 
1. Medicare annual wellness visit, subsequent 2. Acquired hypothyroidism 
-     TSH 3RD GENERATION 3. IFG (impaired fasting glucose) -     METABOLIC PANEL, COMPREHENSIVE 
-     HEMOGLOBIN A1C WITH EAG 4. Elevated LDL cholesterol level 5. Screening for depression 
-     Melissa Umaña Other orders -     INFLUENZA VACCINE INACTIVATED (IIV), SUBUNIT, ADJUVANTED, IM 
-     ADMIN INFLUENZA VIRUS VAC 
 -     varicella-zoster recombinant, PF, (SHINGRIX) 50 mcg/0.5 mL susr injection; 0.5 mL by IntraMUSCular route once for 1 dose. Repeat in 2-6 months Health Maintenance Due Topic Date Due  Influenza Age 5 to Adult  08/01/2018

## 2018-11-05 NOTE — PATIENT INSTRUCTIONS
Medicare Wellness Visit, Male The best way to live healthy is to have a lifestyle where you eat a well-balanced diet, exercise regularly, limit alcohol use, and quit all forms of tobacco/nicotine, if applicable. Regular preventive services are another way to keep healthy. Preventive services (vaccines, screening tests, monitoring & exams) can help personalize your care plan, which helps you manage your own care. Screening tests can find health problems at the earliest stages, when they are easiest to treat. [facility] follows the current, evidence-based guidelines published by the [facility] ([facility]) when recommending preventive services for our patients. Because we follow these guidelines, sometimes recommendations change over time as research supports it. (For example, a prostate screening blood test is no longer routinely recommended for men with no symptoms.) Of course, you and your doctor may decide to screen more often for some diseases, based on your risk and co-morbidities (chronic disease you are already diagnosed with). Preventive services for you include: - Medicare offers their members a free annual wellness visit, which is time for you and your primary care provider to discuss and plan for your preventive service needs. Take advantage of this benefit every year! 
-All adults over age 72 should receive the recommended pneumonia vaccines. Current USPSTF guidelines recommend a series of two vaccines for the best pneumonia protection.  
-All adults should have a flu vaccine yearly and an ECG.  All adults age 61 and older should receive a shingles vaccine once in their lifetime.   
-All adults age 38-68 who are overweight should have a diabetes screening test once every three years.  
-Other screening tests & preventive services for persons with diabetes include: an eye exam to screen for diabetic retinopathy, a kidney function test, a foot exam, and stricter control over your cholesterol.  
-Cardiovascular screening for adults with routine risk involves an electrocardiogram (ECG) at intervals determined by the provider.  
-Colorectal cancer screening should be done for adults age 54-65 with no increased risk factors for colorectal cancer. There are a number of acceptable methods of screening for this type of cancer. Each test has its own benefits and drawbacks. Discuss with your provider what is most appropriate for you during your annual wellness visit. The different tests include: colonoscopy (considered the best screening method), a fecal occult blood test, a fecal DNA test, and sigmoidoscopy. 
-All adults born between [facility] should be screened once for Hepatitis C. 
-An Abdominal Aortic Aneurysm (AAA) Screening is recommended for men age 73-68 who has ever smoked in their lifetime. Here is a list of your current Health Maintenance items (your personalized list of preventive services) with a due date: 
Health Maintenance Due Topic Date Due  
 Flu Vaccine  08/01/2018

## 2018-11-05 NOTE — PROGRESS NOTES
HISTORY OF PRESENT ILLNESS Francesco Kumar is a 80 y.o. male. JAHAIRA Prieto is seen today for a Medicare Wellness Visit and follow up of chronic problems. 1. Preventive medicine. Fully reviewed today. He is due for a examination, shingles vaccine, flu vaccine. He is up to date with other vaccinations, labs, dermatology follow up and urology follow up. 2. Medicare Wellness Visit. See attached note. 3. Chronic medical problems are reviewed. 4. Hypothyroidism. Stable on current regimen. 5. Anxiety. Stable without treatment. He has not taken an Ativan in some time. 6. BPH. Up to date with urology follow up and clinically stable. 7. MGUS. He follows up with hematology. Review of systems notable for 5/10 back pain. He also had recent left hand cellulitis vs an arthritis flareup. He was treated at Urgent Care. Lastly, he has had some IBS type symptoms for about two months. I reviewed some potential treatments for this. Current Outpatient Medications Medication Sig  MYRBETRIQ 25 mg ER tablet  levothyroxine (SYNTHROID) 75 mcg tablet 1 TABLET DAILY BEFORE BREAKFAST, skip Sunday- new dose  sildenafil citrate (VIAGRA) 50 mg tablet Take 1/2 tab daily as needed for ED.  acetaminophen (TYLENOL EXTRA STRENGTH) 500 mg tablet Take  by mouth every six (6) hours as needed for Pain.  fluticasone (FLONASE) 50 mcg/actuation nasal spray INHALE 2 SPRAYS INTO EACH NOSTRIL DAILY  colestipol (COLESTID) 1 gram tablet TAKE 1 TABLET DAILY  LORazepam (ATIVAN) 1 mg tablet TAKE ONE HALF (1/2) TABLET(S) EVERY 8HOURS AS NEEDED FOR ANXIETY.  finasteride (PROSCAR) 5 mg tablet Take 5 mg by mouth daily (with dinner). No current facility-administered medications for this visit. Review of Systems Constitutional: Negative for weight loss. Respiratory: Negative. Cardiovascular: Negative for chest pain, palpitations, leg swelling and PND. Gastrointestinal: Positive for abdominal pain. Musculoskeletal: Positive for back pain and joint pain. Negative for myalgias. Neurological: Negative for focal weakness. Physical Exam  
Constitutional: He is oriented to person, place, and time. He appears well-developed and well-nourished. No distress. HENT:  
Head: Normocephalic and atraumatic. Right Ear: External ear normal.  
Left Ear: External ear normal.  
Eyes: EOM are normal. Pupils are equal, round, and reactive to light. Right eye exhibits no discharge. Left eye exhibits no discharge. Neck: Normal range of motion. Neck supple. Carotid bruit is not present. No thyromegaly present. Cardiovascular: Normal rate, regular rhythm, normal heart sounds and intact distal pulses. Exam reveals no gallop and no friction rub. No murmur heard. Pulmonary/Chest: Effort normal and breath sounds normal. No respiratory distress. He has no wheezes. He has no rales. Abdominal: Soft. Bowel sounds are normal. He exhibits no distension and no mass. There is no tenderness. There is no rebound and no guarding. Musculoskeletal: Normal range of motion. He exhibits no edema or tenderness. Lymphadenopathy:  
  He has no cervical adenopathy. Neurological: He is alert and oriented to person, place, and time. He has normal reflexes. Skin: Skin is warm and dry. No rash noted. Psychiatric: He has a normal mood and affect. His behavior is normal.  
Nursing note and vitals reviewed. ASSESSMENT and PLAN Diagnoses and all orders for this visit: 
 
1. Medicare annual wellness visit, subsequent 2. Acquired hypothyroidism 
-     TSH 3RD GENERATION 3. IFG (impaired fasting glucose) -     METABOLIC PANEL, COMPREHENSIVE 
-     HEMOGLOBIN A1C WITH EAG 4. Elevated LDL cholesterol level- Diet and exercise 5. Screening for depression 
-     [facility] 6. Benign prostatic hyperplasia (BPH) with straining on urination- See urologist as directed 7. Encounter for immunization -     INFLUENZA VACCINE INACTIVATED (IIV), SUBUNIT, ADJUVANTED, IM 
-     ADMIN INFLUENZA VIRUS VAC 
-     varicella-zoster recombinant, PF, (SHINGRIX) 50 mcg/0.5 mL susr injection; 0.5 mL by IntraMUSCular route once for 1 dose. Repeat in 2-6 months

## 2018-11-23 DIAGNOSIS — R09.81 NASAL CONGESTION: ICD-10-CM

## 2018-11-23 DIAGNOSIS — Z98.890 DIARRHEA FOLLOWING GASTROINTESTINAL SURGERY: ICD-10-CM

## 2018-11-23 DIAGNOSIS — R19.7 DIARRHEA FOLLOWING GASTROINTESTINAL SURGERY: ICD-10-CM

## 2018-11-24 RX ORDER — MONTELUKAST SODIUM 4 MG/1
TABLET, CHEWABLE ORAL
Qty: 90 TAB | Refills: 3 | Status: SHIPPED | OUTPATIENT
Start: 2018-11-24 | End: 2019-11-12 | Stop reason: SDUPTHER

## 2018-11-24 RX ORDER — FLUTICASONE PROPIONATE 50 MCG
SPRAY, SUSPENSION (ML) NASAL
Qty: 48 G | Refills: 3 | Status: SHIPPED | OUTPATIENT
Start: 2018-11-24 | End: 2020-01-07

## 2019-02-14 DIAGNOSIS — F41.1 ANXIETY STATE: ICD-10-CM

## 2019-02-14 RX ORDER — LORAZEPAM 1 MG/1
TABLET ORAL
Qty: 30 TAB | Refills: 0 | OUTPATIENT
Start: 2019-02-14 | End: 2019-04-29 | Stop reason: SDUPTHER

## 2019-02-15 NOTE — TELEPHONE ENCOUNTER
Phoned in prescription below to patients pharmacy on file - verbal order received : Dr Adams Shields.

## 2019-04-29 ENCOUNTER — HOSPITAL ENCOUNTER (OUTPATIENT)
Dept: LAB | Age: 84
Discharge: HOME | End: 2019-04-29
Payer: MEDICARE

## 2019-04-29 DIAGNOSIS — F41.1 ANXIETY STATE: ICD-10-CM

## 2019-04-29 PROCEDURE — 80053 COMPREHEN METABOLIC PANEL: CPT

## 2019-04-29 PROCEDURE — 84443 ASSAY THYROID STIM HORMONE: CPT

## 2019-04-29 PROCEDURE — 36415 COLL VENOUS BLD VENIPUNCTURE: CPT

## 2019-04-29 PROCEDURE — 83036 HEMOGLOBIN GLYCOSYLATED A1C: CPT

## 2019-04-29 RX ORDER — LORAZEPAM 1 MG/1
TABLET ORAL
Qty: 30 TAB | Refills: 0 | OUTPATIENT
Start: 2019-04-29 | End: 2019-07-09 | Stop reason: SDUPTHER

## 2019-04-29 NOTE — TELEPHONE ENCOUNTER
Phoned in prescription below to patients pharmacy on file - verbal order received : Dr Kenny Lawrence.

## 2019-04-30 LAB
ALBUMIN SERPL-MCNC: 4.3 G/DL (ref 3.5–4.7)
ALBUMIN/GLOB SERPL: 1.5 {RATIO} (ref 1.2–2.2)
ALP SERPL-CCNC: 53 IU/L (ref 39–117)
ALT SERPL-CCNC: 9 IU/L (ref 0–44)
AST SERPL-CCNC: 15 IU/L (ref 0–40)
BILIRUB SERPL-MCNC: 0.4 MG/DL (ref 0–1.2)
BUN SERPL-MCNC: 17 MG/DL (ref 8–27)
BUN/CREAT SERPL: 16 (ref 10–24)
CALCIUM SERPL-MCNC: 8.4 MG/DL (ref 8.6–10.2)
CHLORIDE SERPL-SCNC: 107 MMOL/L (ref 96–106)
CO2 SERPL-SCNC: 23 MMOL/L (ref 20–29)
CREAT SERPL-MCNC: 1.09 MG/DL (ref 0.76–1.27)
EST. AVERAGE GLUCOSE BLD GHB EST-MCNC: 120 MG/DL
GLOBULIN SER CALC-MCNC: 2.8 G/DL (ref 1.5–4.5)
GLUCOSE SERPL-MCNC: 97 MG/DL (ref 65–99)
HBA1C MFR BLD: 5.8 % (ref 4.8–5.6)
POTASSIUM SERPL-SCNC: 4.2 MMOL/L (ref 3.5–5.2)
PROT SERPL-MCNC: 7.1 G/DL (ref 6–8.5)
SODIUM SERPL-SCNC: 140 MMOL/L (ref 134–144)

## 2019-05-06 ENCOUNTER — DOCUMENTATION ONLY (OUTPATIENT)
Dept: INTERNAL MEDICINE CLINIC | Age: 84
End: 2019-05-06

## 2019-05-06 ENCOUNTER — OFFICE VISIT (OUTPATIENT)
Dept: INTERNAL MEDICINE CLINIC | Age: 84
End: 2019-05-06

## 2019-05-06 VITALS
BODY MASS INDEX: 23.6 KG/M2 | HEART RATE: 76 BPM | TEMPERATURE: 98 F | SYSTOLIC BLOOD PRESSURE: 140 MMHG | OXYGEN SATURATION: 97 % | DIASTOLIC BLOOD PRESSURE: 76 MMHG | WEIGHT: 174.25 LBS | RESPIRATION RATE: 18 BRPM | HEIGHT: 72 IN

## 2019-05-06 DIAGNOSIS — R19.7 DIARRHEA FOLLOWING GASTROINTESTINAL SURGERY: ICD-10-CM

## 2019-05-06 DIAGNOSIS — F41.1 ANXIETY STATE: ICD-10-CM

## 2019-05-06 DIAGNOSIS — E03.9 ACQUIRED HYPOTHYROIDISM: Primary | ICD-10-CM

## 2019-05-06 DIAGNOSIS — Z98.890 DIARRHEA FOLLOWING GASTROINTESTINAL SURGERY: ICD-10-CM

## 2019-05-06 DIAGNOSIS — R73.01 IFG (IMPAIRED FASTING GLUCOSE): ICD-10-CM

## 2019-05-06 RX ORDER — ESCITALOPRAM OXALATE 5 MG/1
5 TABLET ORAL DAILY
Qty: 30 TAB | Refills: 11 | Status: SHIPPED | OUTPATIENT
Start: 2019-05-06 | End: 2019-06-03 | Stop reason: SDUPTHER

## 2019-05-06 NOTE — PROGRESS NOTES
HISTORY OF PRESENT ILLNESS Omari Marinelli is a 80 y.o. male. HPI Subjective:  Aleja Lopez is seen today for follow up of anxiety and other problems. 1. Anxiety. He has had some worsening over the past few months. He has started to use Ativan more often. He came off Lexapro several years ago and had generally done well. Symptoms increased around the holidays last year, but did not have a particular trigger and there are no major life stressors at this point. Reviewed with him the benefit of restarting a low dose of Lexapro for overall symptom control, as well as to lessen his need for the Ativan. He agrees to this approach, as does his wife. 2. Hypothyroidism. This was omitted for unclear reasons by [facility].  Will have the test run on the preexisting specimen. 3. IFG. A1c has increased a bit. Reviewed diet and exercise. 4. MGUS. He will follow up with hematology as directed. Past Medical History:  
Diagnosis Date  Arrhythmia PAT, PVC'S LAST ABOUT A YEAR AGO  Arthritis OSTEO  
 BPH 7/16/2009  Cancer ([facility] [address]   
 basal cell skin cancer, MELANOMA  Cataract   
 bilateral  
 Cataract   
 bilateral  
 Cellulitis 10/15/2018  
 left hand  Depression 2011  Diarrhea 7/16/2009  Hyperlipidemia 7/16/2009  Hypertension NOT TREATED  Other ill-defined conditions(799.89)   
 bph  
 Other ill-defined conditions(799.89)   
 colon polyps  Other ill-defined conditions(799.89)   
 high cholesterol-PT DENIES  Other ill-defined conditions(799.89)   
 shingles  Other ill-defined conditions(799.89) raynauds  Palpitations 7/16/2009  Personal history of colonic polyps 7/16/2009  Proteinuria  Psychiatric disorder ANXIETY AND DEPRESSION  
 Raynaud disease 7/16/2009  Shingles 7/16/2009  
 Skin lesions 10/2018  
 lesions removed by DERM from face and ear  Unspecified sleep apnea NO CPAP Review of Systems Constitutional: Negative for weight loss. Respiratory: Negative. Cardiovascular: Negative for chest pain, palpitations, leg swelling and PND. Gastrointestinal: Positive for diarrhea. Musculoskeletal: Negative for myalgias. Neurological: Negative for focal weakness. Psychiatric/Behavioral: The patient is nervous/anxious. Physical Exam  
Constitutional: No distress. Neck: Carotid bruit is not present. Cardiovascular: Normal rate and regular rhythm. Exam reveals no gallop and no friction rub. No murmur heard. Pulmonary/Chest: Effort normal and breath sounds normal. No respiratory distress. Musculoskeletal: He exhibits no edema. Nursing note and vitals reviewed. ASSESSMENT and PLAN Diagnoses and all orders for this visit: 
 
1. Acquired hypothyroidism- Proceed with plan as discussed 2. Anxiety state 
-     escitalopram oxalate (LEXAPRO) 5 mg tablet; Take 1 Tab by mouth daily. 3. IFG (impaired fasting glucose)- Diet and exercise 4. Diarrhea following gastrointestinal surgery- Continue current regimen of prescription and / or OTC medications Plan was reviewed with patient and family, understanding expressed

## 2019-05-06 NOTE — PROGRESS NOTES
[address] spoke with Viktoriya - asked if too late to add on TSH?  She states they dump specimen at 2 pm. She will send e-mail to let they know to run this test.

## 2019-05-08 LAB
SPECIMEN STATUS REPORT, ROLRST: NORMAL
TSH SERPL DL<=0.005 MIU/L-ACNC: 6.07 UIU/ML (ref 0.45–4.5)

## 2019-05-10 NOTE — PROGRESS NOTES
Call- thyroid running a bit underactive.  Stop skipping Sunday, adjust med list. Will discuss jayson at follow up

## 2019-05-13 DIAGNOSIS — E06.3 ACQUIRED AUTOIMMUNE HYPOTHYROIDISM: ICD-10-CM

## 2019-05-13 RX ORDER — LEVOTHYROXINE SODIUM 75 UG/1
TABLET ORAL
Qty: 90 TAB | Refills: 3
Start: 2019-05-13 | End: 2019-06-03 | Stop reason: SDUPTHER

## 2019-05-13 NOTE — PROGRESS NOTES
Advised pt his thyroid running a bit underactive. Stop skipping Sunday dose. Adjusted med list. MD will discuss jayson at follow up.

## 2019-06-03 ENCOUNTER — OFFICE VISIT (OUTPATIENT)
Dept: INTERNAL MEDICINE CLINIC | Age: 84
End: 2019-06-03

## 2019-06-03 VITALS
BODY MASS INDEX: 23.43 KG/M2 | DIASTOLIC BLOOD PRESSURE: 62 MMHG | WEIGHT: 173 LBS | SYSTOLIC BLOOD PRESSURE: 112 MMHG | RESPIRATION RATE: 16 BRPM | TEMPERATURE: 97.6 F | OXYGEN SATURATION: 98 % | HEART RATE: 72 BPM | HEIGHT: 72 IN

## 2019-06-03 DIAGNOSIS — F41.1 ANXIETY STATE: Primary | ICD-10-CM

## 2019-06-03 RX ORDER — ESCITALOPRAM OXALATE 10 MG/1
10 TABLET ORAL DAILY
Qty: 30 TAB | Refills: 11 | Status: SHIPPED | OUTPATIENT
Start: 2019-06-03 | End: 2019-06-03 | Stop reason: SDUPTHER

## 2019-06-03 RX ORDER — ESCITALOPRAM OXALATE 10 MG/1
10 TABLET ORAL DAILY
Qty: 90 TAB | Refills: 11 | Status: SHIPPED | OUTPATIENT
Start: 2019-06-03 | End: 2020-09-17

## 2019-06-03 NOTE — PROGRESS NOTES
HISTORY OF PRESENT ILLNESS  Bessy Baldwin is a 80 y.o. male. HPI Subjective:  Kathy Ramírez is seen today accompanied by his wife, Rishi Martins, for follow up of anxiety and hypothyroidism. 1. Anxiety. He feels Lexapro is disturbing his sleep. This then leads to him feeling very anxious in the morning, for which he wants to take Ativan. We reviewed a dosage adjustment as we started him off on a very low dose of Lexapro. After a lengthy discussion in the end, we decided to increase the dosage and check his status in four weeks. I have told him he may certainly take the Ativan on a prn basis, but should avoid doing this on any type of scheduled basis. His wife concurs, but he seems a bit unsure of this, as the Ativan makes him feel better. 2. Hypothyroidism. Dosage was changed and will check levels in two months. Review of Systems   Psychiatric/Behavioral: The patient is nervous/anxious and has insomnia. Physical Exam   Constitutional: No distress. Cardiovascular: Normal rate and regular rhythm. Exam reveals no gallop and no friction rub. No murmur heard. Pulmonary/Chest: Effort normal and breath sounds normal.   Neurological: He is alert. Skin: Skin is warm and dry. Psychiatric: He has a normal mood and affect. His behavior is normal.   Nursing note and vitals reviewed. ASSESSMENT and PLAN  Diagnoses and all orders for this visit:    1. Anxiety state  -     escitalopram oxalate (LEXAPRO) 10 mg tablet; Take 1 Tab by mouth daily. Indications: Anxiousness associated with Depression    2.  Hypothyroidism - check levels in 2 mos

## 2019-06-03 NOTE — PROGRESS NOTES
Identified pt with two pt identifiers(name and ). Reviewed record in preparation for visit and have obtained necessary documentation. All patient medications has been reviewed. Chief Complaint   Patient presents with    Thyroid Problem     follow up    [address] Maintenance Due   Topic    Pneumococcal 65+ years (2 of 2 - PPSV23)       Vitals:    19 1300   BP: 97/41   Pulse: 72   Resp: 16   Temp: 97.6 °F (36.4 °C)   TempSrc: Oral   SpO2: 98%   Weight: 173 lb (78.5 kg)   Height: 5' 11.5\" (1.816 m)   PainSc:   0 - No pain       Coordination of Care Questionnaire:   1) Have you been to an emergency room, urgent care, or hospitalized since your last visit?   no       2. Have seen or consulted any other health care provider since your last visit? NO    3) Do you have an Advanced Directive/ Living Will in place? YES  If yes, do we have a copy on file YES  If no, would you like information NO    Patient is accompanied by self I have received verbal consent from Reshma Foster to discuss any/all medical information while they are present in the room.

## 2019-07-09 ENCOUNTER — OFFICE VISIT (OUTPATIENT)
Dept: INTERNAL MEDICINE CLINIC | Age: 84
End: 2019-07-09

## 2019-07-09 VITALS
WEIGHT: 173.8 LBS | DIASTOLIC BLOOD PRESSURE: 62 MMHG | BODY MASS INDEX: 23.54 KG/M2 | HEART RATE: 75 BPM | OXYGEN SATURATION: 96 % | TEMPERATURE: 98.6 F | SYSTOLIC BLOOD PRESSURE: 124 MMHG | HEIGHT: 72 IN | RESPIRATION RATE: 16 BRPM

## 2019-07-09 DIAGNOSIS — E06.3 ACQUIRED AUTOIMMUNE HYPOTHYROIDISM: ICD-10-CM

## 2019-07-09 DIAGNOSIS — F41.1 ANXIETY STATE: Primary | ICD-10-CM

## 2019-07-09 RX ORDER — LORAZEPAM 1 MG/1
TABLET ORAL
Qty: 30 TAB | Refills: 0 | Status: SHIPPED | OUTPATIENT
Start: 2019-07-09 | End: 2022-04-04 | Stop reason: ALTCHOICE

## 2019-07-09 NOTE — PROGRESS NOTES
HISTORY OF PRESENT ILLNESS  Eboni Dallas is a 80 y.o. male. HPI Dion Elizondo is seen today accompanied by his wife, Peter Virgen, for follow-up of anxiety and hypothyroidism. 1. Anxiety. Symptoms have improved. He seems brighter. He has tolerated the higher dose of Lexapro. He has decreased Ativan usage somewhat but there have been some significant life stressors recently so his usage has not significantly diminished. Specifically, his sister-in-law is terminally ill with brain cancer. Dion Elizondo and Peter Virgen are her primary caretakers. 2. Hypothyroidism. He had a dosage adjustment. We will check labs in 1 month. Review of Systems   Psychiatric/Behavioral: The patient is nervous/anxious. The patient does not have insomnia. Physical Exam   Constitutional: No distress. Cardiovascular: Normal rate and regular rhythm. Exam reveals no gallop and no friction rub. No murmur heard. Pulmonary/Chest: Effort normal and breath sounds normal.   Nursing note and vitals reviewed. ASSESSMENT and PLAN  Diagnoses and all orders for this visit:    1. Anxiety state  -     LORazepam (ATIVAN) 1 mg tablet; TAKE ONE HALF (1/2) TABLET(S) EVERY 8HOURS AS NEEDED FOR ANXIETY.     2. Acquired autoimmune hypothyroidism  -     TSH 3RD GENERATION  -     T4, FREE    Plan was reviewed with patient and family, understanding expressed

## 2019-07-09 NOTE — PROGRESS NOTES
Identified pt with two pt identifiers(name and ). Reviewed record in preparation for visit and have obtained necessary documentation. All patient medications has been reviewed. Chief Complaint   Patient presents with    Anxiety     follow up     Thyroid Problem    Medication Evaluation     Lexapro        Health Maintenance Due   Topic    Pneumococcal 65+ years (2 of 2 - PPSV23)    Shingrix Vaccine Age 50> (2 of 2)       Vitals:    19 1316   BP: 124/62   Pulse: 75   Resp: 16   Temp: 98.6 °F (37 °C)   TempSrc: Oral   SpO2: 96%   Weight: 173 lb 12.8 oz (78.8 kg)   Height: 5' 11.5\" (1.816 m)   PainSc:   0 - No pain       Coordination of Care Questionnaire:   1) Have you been to an emergency room, urgent care, or hospitalized since your last visit?   no       2. Have seen or consulted any other health care provider since your last visit? NO    3) Do you have an Advanced Directive/ Living Will in place? YES  If yes, do we have a copy on file YES  If no, would you like information NO    Patient is accompanied by self I have received verbal consent from Quinn Lemus to discuss any/all medical information while they are present in the room.

## 2019-08-05 ENCOUNTER — TELEPHONE (OUTPATIENT)
Dept: INTERNAL MEDICINE CLINIC | Age: 84
End: 2019-08-05

## 2019-08-05 NOTE — TELEPHONE ENCOUNTER
As requested reprinted order for Free T4 and TSH - faxed to [facility], [facility]. at [phone]. Confirmation received.

## 2019-08-05 NOTE — TELEPHONE ENCOUNTER
[facility] [phone]     Please refax thyroid labs to [facility], [facility]. at [phone]- just need to be reprinted and signed by Dr. Jaun Nunez- this facility cannot accept electronic signature.   Pt is there waiting

## 2019-11-12 ENCOUNTER — OFFICE VISIT (OUTPATIENT)
Dept: INTERNAL MEDICINE CLINIC | Age: 84
End: 2019-11-12

## 2019-11-12 VITALS
BODY MASS INDEX: 24.52 KG/M2 | SYSTOLIC BLOOD PRESSURE: 108 MMHG | RESPIRATION RATE: 16 BRPM | DIASTOLIC BLOOD PRESSURE: 56 MMHG | WEIGHT: 181 LBS | TEMPERATURE: 97.9 F | OXYGEN SATURATION: 97 % | HEART RATE: 79 BPM | HEIGHT: 72 IN

## 2019-11-12 DIAGNOSIS — Z13.31 SCREENING FOR DEPRESSION: ICD-10-CM

## 2019-11-12 DIAGNOSIS — E06.3 ACQUIRED AUTOIMMUNE HYPOTHYROIDISM: ICD-10-CM

## 2019-11-12 DIAGNOSIS — Z23 ENCOUNTER FOR IMMUNIZATION: ICD-10-CM

## 2019-11-12 DIAGNOSIS — R19.7 DIARRHEA FOLLOWING GASTROINTESTINAL SURGERY: ICD-10-CM

## 2019-11-12 DIAGNOSIS — R73.01 IFG (IMPAIRED FASTING GLUCOSE): ICD-10-CM

## 2019-11-12 DIAGNOSIS — Z98.890 DIARRHEA FOLLOWING GASTROINTESTINAL SURGERY: ICD-10-CM

## 2019-11-12 DIAGNOSIS — F41.1 ANXIETY STATE: ICD-10-CM

## 2019-11-12 DIAGNOSIS — E78.00 ELEVATED LDL CHOLESTEROL LEVEL: ICD-10-CM

## 2019-11-12 DIAGNOSIS — Z00.00 MEDICARE ANNUAL WELLNESS VISIT, SUBSEQUENT: Primary | ICD-10-CM

## 2019-11-12 RX ORDER — MONTELUKAST SODIUM 4 MG/1
TABLET, CHEWABLE ORAL
Qty: 180 TAB | Refills: 3 | Status: SHIPPED | OUTPATIENT
Start: 2019-11-12 | End: 2020-11-25 | Stop reason: SDUPTHER

## 2019-11-12 NOTE — PATIENT INSTRUCTIONS
Medicare Wellness Visit, Male The best way to live healthy is to have a lifestyle where you eat a well-balanced diet, exercise regularly, limit alcohol use, and quit all forms of tobacco/nicotine, if applicable. Regular preventive services are another way to keep healthy. Preventive services (vaccines, screening tests, monitoring & exams) can help personalize your care plan, which helps you manage your own care. Screening tests can find health problems at the earliest stages, when they are easiest to treat. [facility] follows the current, evidence-based guidelines published by the [facility] ([facility]) when recommending preventive services for our patients. Because we follow these guidelines, sometimes recommendations change over time as research supports it. (For example, a prostate screening blood test is no longer routinely recommended for men with no symptoms). Of course, you and your doctor may decide to screen more often for some diseases, based on your risk and co-morbidities (chronic disease you are already diagnosed with). Preventive services for you include: - Medicare offers their members a free annual wellness visit, which is time for you and your primary care provider to discuss and plan for your preventive service needs. Take advantage of this benefit every year! 
-All adults over age 72 should receive the recommended pneumonia vaccines. Current USPSTF guidelines recommend a series of two vaccines for the best pneumonia protection.  
-All adults should have a flu vaccine yearly and tetanus vaccine every 10 years. 
-All adults age 48 and older should receive the shingles vaccines (series of two vaccines).       
-All adults age 38-68 who are overweight should have a diabetes screening test once every three years.  
-Other screening tests & preventive services for persons with diabetes include: an eye exam to screen for diabetic retinopathy, a kidney function test, a foot exam, and stricter control over your cholesterol.  
-Cardiovascular screening for adults with routine risk involves an electrocardiogram (ECG) at intervals determined by the provider.  
-Colorectal cancer screening should be done for adults age 54-65 with no increased risk factors for colorectal cancer. There are a number of acceptable methods of screening for this type of cancer. Each test has its own benefits and drawbacks. Discuss with your provider what is most appropriate for you during your annual wellness visit. The different tests include: colonoscopy (considered the best screening method), a fecal occult blood test, a fecal DNA test, and sigmoidoscopy. 
-All adults born between [facility] should be screened once for Hepatitis C. 
-An Abdominal Aortic Aneurysm (AAA) Screening is recommended for men age 73-68 who has ever smoked in their lifetime. Here is a list of your current Health Maintenance items (your personalized list of preventive services) with a due date: 
Health Maintenance Due Topic Date Due  Pneumococcal Vaccine (2 of 2 - PPSV23) 10/22/2016  Flu Vaccine  08/01/2019  Glaucoma Screening   09/21/2019 Gurpreet Annual Well Visit  11/06/2019

## 2019-11-12 NOTE — PROGRESS NOTES
HISTORY OF PRESENT ILLNESS  Miranda Chen is a 80 y.o. male. HPI Subjective:  Román Whitaker is seen today for a Medicare wellness visit and follow up of chronic problems. 1. Wellness visit. See attached note. He is due for the flu vaccine. He is up to date with other vaccinations and urology follow up. Colonoscopy is not indicated due to his age. 2. Chronic problems are reviewed. a. Anxiety. Doing much better. He has not required Ativan for 2+ months. He is maintained on Lexapro with good results. b. Hypothyroidism. He is due for routine labs. c. Post-cholecystectomy diarrhea, stable with current regimen. Current Outpatient Medications   Medication Sig    Bifidobacterium Infantis (ALIGN) 4 mg cap Take 1 Cap by mouth daily.  colestipol (COLESTID) 1 gram tablet 1 PO bid    LORazepam (ATIVAN) 1 mg tablet TAKE ONE HALF (1/2) TABLET(S) EVERY 8HOURS AS NEEDED FOR ANXIETY.  escitalopram oxalate (LEXAPRO) 10 mg tablet Take 1 Tab by mouth daily. Indications: Anxiousness associated with Depression    levothyroxine (SYNTHROID) 75 mcg tablet TAKE 1 TABLET DAILY BEFORE BREAKFAST (NEW DIRECTIONS)    fluticasone (FLONASE) 50 mcg/actuation nasal spray USE 2 SPRAYS IN EACH NOSTRIL DAILY    acetaminophen (TYLENOL EXTRA STRENGTH) 500 mg tablet Take  by mouth every six (6) hours as needed for Pain.  finasteride (PROSCAR) 5 mg tablet Take 5 mg by mouth daily (with dinner). No current facility-administered medications for this visit. Review of Systems   Constitutional: Negative for weight loss. HENT: Positive for hearing loss. Respiratory: Negative. Cardiovascular: Negative for chest pain, palpitations, leg swelling and PND. Gastrointestinal: Positive for diarrhea. Genitourinary: Positive for frequency. Musculoskeletal: Negative for myalgias. Neurological: Negative for focal weakness. Physical Exam   Constitutional: He is oriented to person, place, and time.  He appears well-developed and well-nourished. No distress. HENT:   Head: Normocephalic and atraumatic. Right Ear: Tympanic membrane, external ear and ear canal normal.   Left Ear: Tympanic membrane, external ear and ear canal normal.   Eyes: Pupils are equal, round, and reactive to light. EOM are normal. Right eye exhibits no discharge. Left eye exhibits no discharge. Neck: Normal range of motion. Neck supple. Carotid bruit is not present. No thyromegaly present. Cardiovascular: Normal rate, regular rhythm and intact distal pulses. Exam reveals no gallop and no friction rub. Murmur heard. Systolic murmur is present with a grade of 1/6. Pulmonary/Chest: Effort normal and breath sounds normal. No respiratory distress. He has no wheezes. He has no rales. Abdominal: Soft. Bowel sounds are normal. He exhibits no distension and no mass. There is no tenderness. There is no rebound and no guarding. Musculoskeletal: Normal range of motion. He exhibits no edema or tenderness. Lymphadenopathy:     He has no cervical adenopathy. Neurological: He is alert and oriented to person, place, and time. He has normal reflexes. Skin: Skin is warm and dry. No rash noted. Psychiatric: He has a normal mood and affect. His behavior is normal.   Nursing note and vitals reviewed. ASSESSMENT and PLAN  Diagnoses and all orders for this visit:    1. Medicare annual wellness visit, subsequent    2. Screening for depression  -     DEPRESSION SCREEN ANNUAL    3. Anxiety state- Continue current regimen of prescription and / or OTC medications     4. Acquired autoimmune hypothyroidism- follow lab    5. IFG (impaired fasting glucose)  -     URINALYSIS W/ RFLX MICROSCOPIC    6. Diarrhea following gastrointestinal surgery  -     CBC WITH AUTOMATED DIFF  -     METABOLIC PANEL, BASIC  -     HEPATIC FUNCTION PANEL  -     colestipol (COLESTID) 1 gram tablet; 1 PO bid    7.  Elevated LDL cholesterol level  -     LIPID PANEL  -     TSH 3RD GENERATION    8.  Encounter for immunization  -     INFLUENZA VACCINE INACTIVATED (IIV), SUBUNIT, ADJUVANTED, IM  -     DE IMMUNIZ ADMIN,1 SINGLE/COMB VAC/TOXOID    Other orders  -     MICROSCOPIC EXAMINATION

## 2019-11-12 NOTE — PROGRESS NOTES
This is the Subsequent Medicare Annual Wellness Exam, performed 12 months or more after the Initial AWV or the last Subsequent AWV    I have reviewed the patient's medical history in detail and updated the computerized patient record.      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 [address]     basal cell skin cancer, MELANOMA    Cataract     bilateral    Cataract     bilateral    Cellulitis 10/15/2018    left hand     Depression 2011    Diarrhea 7/16/2009    Hyperlipidemia 7/16/2009    Hypertension     NOT TREATED    Other ill-defined conditions(799.89)     bph    Other ill-defined conditions(799.89)     colon polyps    Other ill-defined conditions(799.89)     high cholesterol-PT DENIES    Other ill-defined conditions(799.89)     shingles    Other ill-defined conditions(799.89)     raynauds    Palpitations 7/16/2009    Personal history of colonic polyps 7/16/2009    Proteinuria     Psychiatric disorder     ANXIETY AND DEPRESSION    Raynaud disease 7/16/2009    Shingles 7/16/2009    Skin lesions 10/2018    lesions removed by DERM from face and ear     Unspecified sleep apnea     NO CPAP      Past Surgical History:   Procedure Laterality Date    ENDOSCOPY, COLON, DIAGNOSTIC      10, due 15    HX APPENDECTOMY  1987    HX CATARACT REMOVAL  02/22/16    left eye & implant    HX CATARACT REMOVAL  04/25/2016    right eye & implant    HX CHOLECYSTECTOMY  1998    HX COLECTOMY  1987    HEMICOLECTOMY    HX COLONOSCOPY      2012    HX HEENT  fall 2016    skin cancer removal - right lower calf -Dr. Liv Greco HX OTHER SURGICAL      basal cell cancer removed face and ear     Current Outpatient Medications   Medication Sig Dispense Refill    Bifidobacterium Infantis (ALIGN) 4 mg cap Take 1 Cap by mouth daily.  LORazepam (ATIVAN) 1 mg tablet TAKE ONE HALF (1/2) TABLET(S) EVERY 8HOURS AS NEEDED FOR ANXIETY. 30 Tab 0    escitalopram oxalate (LEXAPRO) 10 mg tablet Take 1 Tab by mouth daily. Indications: Anxiousness associated with Depression 90 Tab 11    levothyroxine (SYNTHROID) 75 mcg tablet TAKE 1 TABLET DAILY BEFORE BREAKFAST (NEW DIRECTIONS) 90 Tab 3    colestipol (COLESTID) 1 gram tablet TAKE 1 TABLET DAILY 90 Tab 3    fluticasone (FLONASE) 50 mcg/actuation nasal spray USE 2 SPRAYS IN EACH NOSTRIL DAILY 48 g 3    acetaminophen (TYLENOL EXTRA STRENGTH) 500 mg tablet Take  by mouth every six (6) hours as needed for Pain.  finasteride (PROSCAR) 5 mg tablet Take 5 mg by mouth daily (with dinner).        Allergies   Allergen Reactions    Hydrocodone Other (comments)     WEIRD DREAMS       Family History   Problem Relation Age of Onset    Stroke Father     Diabetes Brother     Kidney Disease Brother     Cancer Brother         lung    Cancer Mother         BREAST WITH METS TO LUNG    Heart Disease Mother     Lung Disease Sister     Cancer Sister         COLON    Cancer Sister         MELANOMA    Heart Attack Sister      Social History     Tobacco Use    Smoking status: Never Smoker    Smokeless tobacco: Never Used   Substance Use Topics    Alcohol use: No     Alcohol/week: 0.0 standard drinks     Comment: RARELY       Depression Risk Factor Screening:     3 most recent PHQ Screens 4/14/2014   Little interest or pleasure in doing things Not at all   Feeling down, depressed, irritable, or hopeless Not at all   Total Score PHQ 2 0       Alcohol Risk Factor Screening (MALE > 65): Do you average more 1 drink per night or more than 7 drinks a week: No    In the past three months have you have had more than 4 drinks containing alcohol on one occasion: No      Functional Ability and Level of Safety:   Hearing: wears aids    Activities of Daily Living: The home contains: no safety equipment. Patient does total self care    Ambulation: with no difficulty    Fall Risk:  Fall Risk Assessment, last 12 mths 11/12/2019   Able to walk? Yes   Fall in past 12 months? No   Fall with injury? -   Number of falls in past 12 months -   Fall Risk Score -       Abuse Screen:  Patient is not abused    Cognitive Screening   Has your family/caregiver stated any concerns about your memory: no      Patient Care Team   Patient Care Team:  Herbert Looney MD as PCP - General  Herbert Looney MD as PCP - [facility] Provider  Tayo Lu RN as [address] [place] [address]  Edwin Mena MD (Cardiology)  Levi Francisco DDS as Physician (Dental General Practice)  April Higuera MD as Physician (Ophthalmology)  Willy Chan MD as Physician (Dermatology)  Gay Gallego MD as Physician (Urology)  Kamran Goddard MD as Physician (Hematology and Oncology)    Assessment/Plan   Education and counseling provided:  Are appropriate based on today's review and evaluation    Diagnoses and all orders for this visit:    1. Medicare annual wellness visit, subsequent    2.  Screening for depression  -     DEPRESSION SCREEN ANNUAL    Other orders  -     CBC WITH AUTOMATED DIFF  -     METABOLIC PANEL, BASIC  -     LIPID PANEL  -     HEPATIC FUNCTION PANEL  -     TSH 3RD GENERATION  -     URINALYSIS W/ RFLX MICROSCOPIC  -     PSA SCREENING (SCREENING)  -     INFLUENZA VACCINE INACTIVATED (IIV), SUBUNIT, ADJUVANTED, IM  -     AL IMMUNIZ ADMIN,1 SINGLE/COMB VAC/TOXOID        Health Maintenance Due   Topic Date Due    Pneumococcal 65+ years (2 of 2 - PPSV23) 10/22/2016    Influenza Age 5 to Adult  08/01/2019    GLAUCOMA SCREENING Q2Y  09/21/2019    MEDICARE YEARLY EXAM  11/06/2019

## 2019-11-13 ENCOUNTER — HOSPITAL ENCOUNTER (OUTPATIENT)
Dept: LAB | Age: 84
Discharge: HOME | End: 2019-11-13
Payer: MEDICARE

## 2019-11-13 PROCEDURE — 80048 BASIC METABOLIC PNL TOTAL CA: CPT

## 2019-11-13 PROCEDURE — 80076 HEPATIC FUNCTION PANEL: CPT

## 2019-11-13 PROCEDURE — 36415 COLL VENOUS BLD VENIPUNCTURE: CPT

## 2019-11-13 PROCEDURE — 81001 URINALYSIS AUTO W/SCOPE: CPT

## 2019-11-13 PROCEDURE — 80061 LIPID PANEL: CPT

## 2019-11-13 PROCEDURE — 85025 COMPLETE CBC W/AUTO DIFF WBC: CPT

## 2019-11-13 PROCEDURE — 84443 ASSAY THYROID STIM HORMONE: CPT

## 2019-11-14 LAB
ALBUMIN SERPL-MCNC: 4.5 G/DL (ref 3.2–4.6)
ALP SERPL-CCNC: 50 IU/L (ref 39–117)
ALT SERPL-CCNC: 12 IU/L (ref 0–44)
APPEARANCE UR: CLEAR
AST SERPL-CCNC: 19 IU/L (ref 0–40)
BACTERIA #/AREA URNS HPF: ABNORMAL /[HPF]
BASOPHILS # BLD AUTO: 0 X10E3/UL (ref 0–0.2)
BASOPHILS NFR BLD AUTO: 0 %
BILIRUB DIRECT SERPL-MCNC: 0.1 MG/DL (ref 0–0.4)
BILIRUB SERPL-MCNC: 0.4 MG/DL (ref 0–1.2)
BILIRUB UR QL STRIP: NEGATIVE
BUN SERPL-MCNC: 21 MG/DL (ref 10–36)
BUN/CREAT SERPL: 20 (ref 10–24)
CALCIUM SERPL-MCNC: 9 MG/DL (ref 8.6–10.2)
CASTS URNS QL MICRO: ABNORMAL /LPF
CHLORIDE SERPL-SCNC: 103 MMOL/L (ref 96–106)
CHOLEST SERPL-MCNC: 138 MG/DL (ref 100–199)
CO2 SERPL-SCNC: 25 MMOL/L (ref 20–29)
COLOR UR: YELLOW
CREAT SERPL-MCNC: 1.04 MG/DL (ref 0.76–1.27)
EOSINOPHIL # BLD AUTO: 0 X10E3/UL (ref 0–0.4)
EOSINOPHIL NFR BLD AUTO: 0 %
EPI CELLS #/AREA URNS HPF: ABNORMAL /HPF (ref 0–10)
ERYTHROCYTE [DISTWIDTH] IN BLOOD BY AUTOMATED COUNT: 12.3 % (ref 12.3–15.4)
GLUCOSE SERPL-MCNC: 95 MG/DL (ref 65–99)
GLUCOSE UR QL: NEGATIVE
HCT VFR BLD AUTO: 37.5 % (ref 37.5–51)
HDLC SERPL-MCNC: 60 MG/DL
HGB BLD-MCNC: 12.1 G/DL (ref 13–17.7)
HGB UR QL STRIP: ABNORMAL
IMM GRANULOCYTES # BLD AUTO: 0.1 X10E3/UL (ref 0–0.1)
IMM GRANULOCYTES NFR BLD AUTO: 1 %
KETONES UR QL STRIP: NEGATIVE
LDLC SERPL CALC-MCNC: 64 MG/DL (ref 0–99)
LEUKOCYTE ESTERASE UR QL STRIP: NEGATIVE
LYMPHOCYTES # BLD AUTO: 1.5 X10E3/UL (ref 0.7–3.1)
LYMPHOCYTES NFR BLD AUTO: 23 %
MCH RBC QN AUTO: 30.3 PG (ref 26.6–33)
MCHC RBC AUTO-ENTMCNC: 32.3 G/DL (ref 31.5–35.7)
MCV RBC AUTO: 94 FL (ref 79–97)
MICRO URNS: ABNORMAL
MONOCYTES # BLD AUTO: 1.9 X10E3/UL (ref 0.1–0.9)
MONOCYTES NFR BLD AUTO: 29 %
MORPHOLOGY BLD-IMP: ABNORMAL
MUCOUS THREADS URNS QL MICRO: PRESENT
NEUTROPHILS # BLD AUTO: 3.1 X10E3/UL (ref 1.4–7)
NEUTROPHILS NFR BLD AUTO: 47 %
NITRITE UR QL STRIP: NEGATIVE
PH UR STRIP: 6 [PH] (ref 5–7.5)
PLATELET # BLD AUTO: 145 X10E3/UL (ref 150–450)
POTASSIUM SERPL-SCNC: 4.6 MMOL/L (ref 3.5–5.2)
PROT SERPL-MCNC: 7.2 G/DL (ref 6–8.5)
PROT UR QL STRIP: ABNORMAL
RBC # BLD AUTO: 3.99 X10E6/UL (ref 4.14–5.8)
RBC #/AREA URNS HPF: ABNORMAL /HPF (ref 0–2)
SODIUM SERPL-SCNC: 142 MMOL/L (ref 134–144)
SP GR UR: 1.02 (ref 1–1.03)
TRIGL SERPL-MCNC: 72 MG/DL (ref 0–149)
TSH SERPL DL<=0.005 MIU/L-ACNC: 3.92 UIU/ML (ref 0.45–4.5)
UROBILINOGEN UR STRIP-MCNC: 0.2 MG/DL (ref 0.2–1)
VLDLC SERPL CALC-MCNC: 14 MG/DL (ref 5–40)
WBC # BLD AUTO: 6.5 X10E3/UL (ref 3.4–10.8)
WBC #/AREA URNS HPF: ABNORMAL /HPF (ref 0–5)

## 2020-01-06 DIAGNOSIS — R09.81 NASAL CONGESTION: ICD-10-CM

## 2020-01-07 RX ORDER — FLUTICASONE PROPIONATE 50 MCG
SPRAY, SUSPENSION (ML) NASAL
Qty: 48 G | Refills: 4 | Status: SHIPPED | OUTPATIENT
Start: 2020-01-07 | End: 2021-04-20

## 2020-05-12 ENCOUNTER — VIRTUAL VISIT (OUTPATIENT)
Dept: INTERNAL MEDICINE CLINIC | Age: 85
End: 2020-05-12

## 2020-05-12 VITALS
WEIGHT: 184 LBS | DIASTOLIC BLOOD PRESSURE: 69 MMHG | BODY MASS INDEX: 25.31 KG/M2 | SYSTOLIC BLOOD PRESSURE: 116 MMHG | HEART RATE: 71 BPM

## 2020-05-12 DIAGNOSIS — E78.00 ELEVATED LDL CHOLESTEROL LEVEL: ICD-10-CM

## 2020-05-12 DIAGNOSIS — E06.3 ACQUIRED AUTOIMMUNE HYPOTHYROIDISM: Primary | ICD-10-CM

## 2020-05-12 DIAGNOSIS — R19.7 DIARRHEA FOLLOWING GASTROINTESTINAL SURGERY: ICD-10-CM

## 2020-05-12 DIAGNOSIS — R73.01 IFG (IMPAIRED FASTING GLUCOSE): ICD-10-CM

## 2020-05-12 DIAGNOSIS — F41.1 ANXIETY STATE: ICD-10-CM

## 2020-05-12 DIAGNOSIS — Z98.890 DIARRHEA FOLLOWING GASTROINTESTINAL SURGERY: ICD-10-CM

## 2020-05-12 NOTE — PROGRESS NOTES
HISTORY OF PRESENT ILLNESS  Skyler Barker is a 80 y.o. male. This is a real-time audio/video visit brought to you by our sponsors, the fine folks at [facility] [place] and [employer]   HPI Hayde Del Rosario is seen today company by his wife, Jimmie Kitchen" for follow-up of hypothyroidism and other problems. 1. Hypothyroidism, clinically stable. He is due for labs but I think we can wait for 6 weeks until some of the pandemic restrictions ease. 2. Anxiety, stable on current regimen. Rare use of Ativan at this point. He feels comfortable with his current dosage of Lexapro, so we will not adjust currently. 3. Post cholecystectomy diarrhea. Sophia Denise is helpful. He also is on Colestid. 4. IFG, elevated LDL cholesterol, due for routine labs. 5. Preventive medicine:  Wellness visit in 6 months. He is up-to-date with eye exam, and we will request the report. Current Outpatient Medications   Medication Sig    levothyroxine (SYNTHROID) 75 mcg tablet TAKE 1 TABLET DAILY BEFORE BREAKFAST (NEW DIRECTIONS)    fluticasone propionate (FLONASE) 50 mcg/actuation nasal spray USE 2 SPRAYS IN EACH NOSTRIL DAILY    Bifidobacterium Infantis (ALIGN) 4 mg cap Take 1 Cap by mouth daily.  colestipol (COLESTID) 1 gram tablet 1 PO bid    LORazepam (ATIVAN) 1 mg tablet TAKE ONE HALF (1/2) TABLET(S) EVERY 8HOURS AS NEEDED FOR ANXIETY.  escitalopram oxalate (LEXAPRO) 10 mg tablet Take 1 Tab by mouth daily. Indications: Anxiousness associated with Depression    acetaminophen (TYLENOL EXTRA STRENGTH) 500 mg tablet Take  by mouth every six (6) hours as needed for Pain.  finasteride (PROSCAR) 5 mg tablet Take 5 mg by mouth daily (with dinner). No current facility-administered medications for this visit. Review of Systems   Constitutional: Negative for weight loss. Respiratory: Negative. Cardiovascular: Negative for chest pain, palpitations, leg swelling and PND.    Gastrointestinal: Negative for diarrhea. Musculoskeletal: Negative for myalgias. Neurological: Negative for focal weakness. Psychiatric/Behavioral: The patient is not nervous/anxious. Physical Exam  Vitals signs and nursing note reviewed. Constitutional:       Appearance: He is not ill-appearing. Skin:     General: Skin is warm and dry. Neurological:      Mental Status: He is alert. Psychiatric:         Behavior: Behavior normal.         ASSESSMENT and PLAN  Diagnoses and all orders for this visit:    1. Acquired autoimmune hypothyroidism  -     TSH 3RD GENERATION    2. Anxiety state- Continue current regimen of prescription and / or OTC medications     3. IFG (impaired fasting glucose)- Diet and exercise     4. Diarrhea following gastrointestinal surgery- Continue current regimen of prescription and / or OTC medications     5.  Elevated LDL cholesterol level- follow labs

## 2020-07-06 ENCOUNTER — TELEPHONE (OUTPATIENT)
Dept: INTERNAL MEDICINE CLINIC | Age: 85
End: 2020-07-06

## 2020-07-08 ENCOUNTER — HOSPITAL ENCOUNTER (OUTPATIENT)
Dept: LAB | Age: 85
Discharge: HOME | End: 2020-07-08
Payer: MEDICARE

## 2020-07-08 PROCEDURE — 84443 ASSAY THYROID STIM HORMONE: CPT

## 2020-07-08 PROCEDURE — 36415 COLL VENOUS BLD VENIPUNCTURE: CPT

## 2020-07-09 LAB — TSH SERPL DL<=0.005 MIU/L-ACNC: 6.1 UIU/ML (ref 0.45–4.5)

## 2020-07-19 ENCOUNTER — PATIENT MESSAGE (OUTPATIENT)
Dept: INTERNAL MEDICINE CLINIC | Age: 85
End: 2020-07-19

## 2020-07-21 DIAGNOSIS — E06.3 ACQUIRED AUTOIMMUNE HYPOTHYROIDISM: ICD-10-CM

## 2020-07-21 RX ORDER — LEVOTHYROXINE SODIUM 75 UG/1
TABLET ORAL
Qty: 1 TAB | Refills: 0
Start: 2020-07-21 | End: 2020-07-23 | Stop reason: SDUPTHER

## 2020-07-23 DIAGNOSIS — E06.3 ACQUIRED AUTOIMMUNE HYPOTHYROIDISM: ICD-10-CM

## 2020-07-23 RX ORDER — LEVOTHYROXINE SODIUM 75 UG/1
TABLET ORAL
Qty: 96 TAB | Refills: 0 | Status: SHIPPED | COMMUNITY
Start: 2020-07-23 | End: 2020-10-31 | Stop reason: SDUPTHER

## 2020-07-23 NOTE — TELEPHONE ENCOUNTER
Spoke with pt - he asked me to talk to his wife Gaby Miramontes. Kermit Fong that MD wanted to make sure pt received My Chart message to increase his Levothyroxine 75 mcq to one every day except one and 1/2 on Tuesday mornings? She states yes and requested we send in refill to mail order. Done.

## 2020-09-17 DIAGNOSIS — F41.1 ANXIETY STATE: ICD-10-CM

## 2020-09-17 RX ORDER — ESCITALOPRAM OXALATE 10 MG/1
TABLET ORAL
Qty: 90 TAB | Refills: 3 | Status: SHIPPED | OUTPATIENT
Start: 2020-09-17 | End: 2021-08-10

## 2020-10-30 DIAGNOSIS — E06.3 ACQUIRED AUTOIMMUNE HYPOTHYROIDISM: ICD-10-CM

## 2020-10-31 RX ORDER — LEVOTHYROXINE SODIUM 75 UG/1
TABLET ORAL
Qty: 96 TAB | Refills: 3 | Status: SHIPPED | OUTPATIENT
Start: 2020-10-31 | End: 2021-10-26

## 2020-11-12 ENCOUNTER — OFFICE VISIT (OUTPATIENT)
Dept: INTERNAL MEDICINE CLINIC | Age: 85
End: 2020-11-12
Payer: MEDICARE

## 2020-11-12 VITALS
SYSTOLIC BLOOD PRESSURE: 128 MMHG | HEIGHT: 72 IN | HEART RATE: 62 BPM | DIASTOLIC BLOOD PRESSURE: 52 MMHG | RESPIRATION RATE: 16 BRPM | OXYGEN SATURATION: 97 % | BODY MASS INDEX: 24.16 KG/M2 | WEIGHT: 178.4 LBS | TEMPERATURE: 97.1 F

## 2020-11-12 DIAGNOSIS — E78.00 ELEVATED LDL CHOLESTEROL LEVEL: ICD-10-CM

## 2020-11-12 DIAGNOSIS — E78.2 MIXED HYPERLIPIDEMIA: ICD-10-CM

## 2020-11-12 DIAGNOSIS — R19.7 DIARRHEA FOLLOWING GASTROINTESTINAL SURGERY: ICD-10-CM

## 2020-11-12 DIAGNOSIS — Z79.899 ENCOUNTER FOR LONG-TERM (CURRENT) USE OF MEDICATIONS: ICD-10-CM

## 2020-11-12 DIAGNOSIS — R73.01 IFG (IMPAIRED FASTING GLUCOSE): ICD-10-CM

## 2020-11-12 DIAGNOSIS — Z13.31 SCREENING FOR DEPRESSION: ICD-10-CM

## 2020-11-12 DIAGNOSIS — R03.0 ELEVATED BLOOD PRESSURE READING WITHOUT DIAGNOSIS OF HYPERTENSION: ICD-10-CM

## 2020-11-12 DIAGNOSIS — Z12.5 SCREENING FOR PROSTATE CANCER: ICD-10-CM

## 2020-11-12 DIAGNOSIS — R73.01 IMPAIRED FASTING GLUCOSE: ICD-10-CM

## 2020-11-12 DIAGNOSIS — E06.3 ACQUIRED AUTOIMMUNE HYPOTHYROIDISM: ICD-10-CM

## 2020-11-12 DIAGNOSIS — Z00.00 MEDICARE ANNUAL WELLNESS VISIT, SUBSEQUENT: Primary | ICD-10-CM

## 2020-11-12 DIAGNOSIS — F41.1 ANXIETY STATE: ICD-10-CM

## 2020-11-12 DIAGNOSIS — Z98.890 DIARRHEA FOLLOWING GASTROINTESTINAL SURGERY: ICD-10-CM

## 2020-11-12 PROCEDURE — 99214 OFFICE O/P EST MOD 30 MIN: CPT | Performed by: INTERNAL MEDICINE

## 2020-11-12 PROCEDURE — G0463 HOSPITAL OUTPT CLINIC VISIT: HCPCS | Performed by: INTERNAL MEDICINE

## 2020-11-12 PROCEDURE — G8420 CALC BMI NORM PARAMETERS: HCPCS | Performed by: INTERNAL MEDICINE

## 2020-11-12 PROCEDURE — G0439 PPPS, SUBSEQ VISIT: HCPCS | Performed by: INTERNAL MEDICINE

## 2020-11-12 PROCEDURE — G8536 NO DOC ELDER MAL SCRN: HCPCS | Performed by: INTERNAL MEDICINE

## 2020-11-12 PROCEDURE — G9717 DOC PT DX DEP/BP F/U NT REQ: HCPCS | Performed by: INTERNAL MEDICINE

## 2020-11-12 PROCEDURE — 1101F PT FALLS ASSESS-DOCD LE1/YR: CPT | Performed by: INTERNAL MEDICINE

## 2020-11-12 PROCEDURE — G8427 DOCREV CUR MEDS BY ELIG CLIN: HCPCS | Performed by: INTERNAL MEDICINE

## 2020-11-12 NOTE — PROGRESS NOTES
HISTORY OF PRESENT ILLNESS  Nancy Collins is a 80 y.o. male. JAHAIRA Sinclair is seen today for a Medicare Wellness Visit and follow up of chronic problems. Preventive Medicine. See attached note for full details. He is due for labs. He is up to date with urology follow up and vaccinations. PSA no longer indicated due to his age. Chronic Problems Reviewed. 1. Hypothyroidism. Stable on current regimen, will continue to follow labs. 2. Anxiety. Stable on current regimen. 3. Post cholecystectomy diarrhea. Stable on current regimen. Review of Systems. Notable for some neck pain consistent with cervical strain about a month ago. There has been some improvement. He takes Tylenol. He declines PT at this time. I did review with him some stretching exercises and these were printed on his visit summary. He will let me know if his symptoms persist.     Current Outpatient Medications   Medication Sig    levothyroxine (SYNTHROID) 75 mcg tablet TAKE 1 TABLET DAILY EXCEPT ONE AND ONE-HALF TABLETS ON TUESDAY (NEW DIRECTIONS)    escitalopram oxalate (LEXAPRO) 10 mg tablet TAKE 1 TABLET DAILY FOR ANXIOUSNESS ASSOCIATED WITH DEPRESSION    fluticasone propionate (FLONASE) 50 mcg/actuation nasal spray USE 2 SPRAYS IN EACH NOSTRIL DAILY    Bifidobacterium Infantis (ALIGN) 4 mg cap Take 1 Cap by mouth daily.  colestipol (COLESTID) 1 gram tablet 1 PO bid    LORazepam (ATIVAN) 1 mg tablet TAKE ONE HALF (1/2) TABLET(S) EVERY 8HOURS AS NEEDED FOR ANXIETY.  acetaminophen (TYLENOL EXTRA STRENGTH) 500 mg tablet Take  by mouth every six (6) hours as needed for Pain.  finasteride (PROSCAR) 5 mg tablet Take 5 mg by mouth daily (with dinner). No current facility-administered medications for this visit. Review of Systems   Constitutional: Negative for weight loss. HENT: Positive for congestion and hearing loss. Negative for sore throat. Respiratory: Negative.     Cardiovascular: Negative for chest pain, palpitations, leg swelling and PND. Gastrointestinal: Negative. Genitourinary: Positive for frequency. Musculoskeletal: Positive for neck pain. Negative for myalgias. Neurological: Negative for focal weakness. Psychiatric/Behavioral: The patient is nervous/anxious. Some flare       Physical Exam  Vitals signs and nursing note reviewed. Constitutional:       General: He is not in acute distress. Appearance: He is well-developed. HENT:      Head: Normocephalic and atraumatic. Right Ear: Tympanic membrane, ear canal and external ear normal.      Left Ear: Tympanic membrane, ear canal and external ear normal.   Eyes:      General:         Right eye: No discharge. Left eye: No discharge. Pupils: Pupils are equal, round, and reactive to light. Neck:      Musculoskeletal: Normal range of motion and neck supple. Thyroid: No thyromegaly. Vascular: No carotid bruit. Cardiovascular:      Rate and Rhythm: Normal rate and regular rhythm. Heart sounds: Normal heart sounds. No murmur. No friction rub. No gallop. Pulmonary:      Effort: Pulmonary effort is normal. No respiratory distress. Breath sounds: Normal breath sounds. No wheezing or rales. Abdominal:      General: Bowel sounds are normal. There is no distension. Palpations: Abdomen is soft. There is no mass. Tenderness: There is no abdominal tenderness. There is no guarding or rebound. Musculoskeletal: Normal range of motion. General: No tenderness. Lymphadenopathy:      Cervical: No cervical adenopathy. Skin:     General: Skin is warm and dry. Findings: No rash. Neurological:      Mental Status: He is alert and oriented to person, place, and time. Deep Tendon Reflexes: Reflexes are normal and symmetric. Psychiatric:         Behavior: Behavior normal.         ASSESSMENT and PLAN  Diagnoses and all orders for this visit:    1.  Medicare annual wellness visit, subsequent    2. Elevated blood pressure reading without diagnosis of hypertension  -     METABOLIC PANEL, BASIC; Future  -     URINALYSIS W/ RFLX MICROSCOPIC; Future    3. Mixed hyperlipidemia  -     LIPID PANEL; Future  -     HEPATIC FUNCTION PANEL; Future    4. Impaired fasting glucose  -     HEMOGLOBIN A1C WITH EAG; Future    5. Acquired autoimmune hypothyroidism  -     TSH 3RD GENERATION; Future    6. Screening for prostate cancer    7. Encounter for long-term (current) use of medications  -     CBC WITH AUTOMATED DIFF; Future    8. IFG (impaired fasting glucose)- Diet and exercise     9. Anxiety state- Continue current regimen of prescription and / or OTC medications     10. Diarrhea following gastrointestinal surgery- Continue current regimen of prescription and / or OTC medications     11. Elevated LDL cholesterol level- Diet and exercise     12. Screening for depression  -     Melissa Umaña      This is the Subsequent Medicare Annual Wellness Exam, performed 12 months or more after the Initial AWV or the last Subsequent AWV    I have reviewed the patient's medical history in detail and updated the computerized patient record. Depression Risk Factor Screening:     3 most recent PHQ Screens 4/14/2014   Little interest or pleasure in doing things Not at all   Feeling down, depressed, irritable, or hopeless Not at all   Total Score PHQ 2 0       Alcohol Risk Screen   Do you average more than 1 drink per night or more than 7 drinks a week: No- 0    In the past three months have you have had more than 4 drinks containing alcohol on one occasion: No        Functional Ability and Level of Safety:   Hearing: The patient wears hearing aids. Activities of Daily Living: The home contains: no safety equipment. Patient does total self care     Ambulation: with no difficulty     Fall Risk:  Fall Risk Assessment, last 12 mths 11/12/2019   Able to walk? Yes   Fall in past 12 months?  No   Fall with injury? -   Number of falls in past 12 months -   Fall Risk Score -     Abuse Screen:  Patient is not abused       Cognitive Screening   Has your family/caregiver stated any concerns about your memory: no         Assessment/Plan   Education and counseling provided:  Are appropriate based on today's review and evaluation    Diagnoses and all orders for this visit:    1. Medicare annual wellness visit, subsequent    2. Elevated blood pressure reading without diagnosis of hypertension  -     METABOLIC PANEL, BASIC; Future  -     URINALYSIS W/ RFLX MICROSCOPIC; Future    3. Mixed hyperlipidemia  -     LIPID PANEL; Future  -     HEPATIC FUNCTION PANEL; Future    4. Impaired fasting glucose  -     HEMOGLOBIN A1C WITH EAG; Future    5. Acquired autoimmune hypothyroidism  -     TSH 3RD GENERATION; Future    6. Screening for prostate cancer  -     PSA SCREENING (SCREENING); Future    7. Encounter for long-term (current) use of medications  -     CBC WITH AUTOMATED DIFF; Future    8. IFG (impaired fasting glucose)    9. Anxiety state    10. Diarrhea following gastrointestinal surgery    11. Elevated LDL cholesterol level    12.  Screening for depression  -     [place] Maintenance Due     Health Maintenance Due   Topic Date Due    Flu Vaccine (1) 09/01/2020    Medicare Yearly Exam  11/12/2020       Patient Care Team   Patient Care Team:  Virgina Brunner, MD as PCP - General  Virgina Brunner, MD as PCP - [facility][1] Empaneled Provider  Frank Walters, RN as Ambulatory Care Manager  Unruly Bass MD (Cardiology)  Nikki Culver DDS as Physician (Dental General Practice)  Sukhi Bliss MD as Physician (Ophthalmology)  Delfina Ceballos MD as Physician (Dermatology)  Miguel Cervantes MD as Physician (Urology)  Marina Nur MD as Physician (Hematology and Oncology)    History     Patient Active Problem List   Diagnosis Code    Personal history of colonic polyps Z86.010    Hyperlipidemia E78.5    Diarrhea R19.7    Shingles B02.9    Raynaud disease I73.00    Palpitations R00.2    Anxiety state F41.1    PVC (premature ventricular contraction) I49.3    Elevated blood pressure reading without diagnosis of hypertension R03.0    BPH (benign prostatic hyperplasia) N40.0    LBBB (left bundle branch block) I44.7    Impaired fasting glucose R73.01    Proteinuria R80.9    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    Situational depression F43.21    Cataract H26.9    Advance directive on file Z78.9    Diarrhea following gastrointestinal surgery R19.7, Z98.890    MGUS (monoclonal gammopathy of unknown significance) D47.2     Past Medical History:   Diagnosis Date    Arrhythmia     PAT, PVC'S LAST ABOUT A YEAR AGO    Arthritis     OSTEO    BPH 7/16/2009    Cancer ([facility] [address]     basal cell skin cancer, MELANOMA    Cataract     bilateral    Cataract     bilateral    Cellulitis 10/15/2018    left hand     Depression 2011    Diarrhea 7/16/2009    Hyperlipidemia 7/16/2009    Hypertension     NOT TREATED    Other ill-defined conditions(799.89)     bph    Other ill-defined conditions(799.89)     colon polyps    Other ill-defined conditions(799.89)     high cholesterol-PT DENIES    Other ill-defined conditions(799.89)     shingles    Other ill-defined conditions(799.89)     raynauds    Palpitations 7/16/2009    Personal history of colonic polyps 7/16/2009    Proteinuria     Psychiatric disorder     ANXIETY AND DEPRESSION    Raynaud disease 7/16/2009    Shingles 7/16/2009    Skin lesions 10/2018    lesions removed by DERM from face and ear     Unspecified sleep apnea     NO CPAP      Past Surgical History:   Procedure Laterality Date    ENDOSCOPY, COLON, DIAGNOSTIC      10, due 15    HX APPENDECTOMY  1987    HX CATARACT REMOVAL  02/22/16    left eye & implant    HX CATARACT REMOVAL  04/25/2016    right eye & implant    HX CHOLECYSTECTOMY  1998    HX COLECTOMY  1987 HEMICOLECTOMY    HX COLONOSCOPY      2012    HX HEENT  fall 2016    skin cancer removal - right lower calf -Dr. Alverto Lizama    HX OTHER SURGICAL      basal cell cancer removed face and ear     Current Outpatient Medications   Medication Sig Dispense Refill    levothyroxine (SYNTHROID) 75 mcg tablet TAKE 1 TABLET DAILY EXCEPT ONE AND ONE-HALF TABLETS ON TUESDAY (NEW DIRECTIONS) 96 Tab 3    escitalopram oxalate (LEXAPRO) 10 mg tablet TAKE 1 TABLET DAILY FOR ANXIOUSNESS ASSOCIATED WITH DEPRESSION 90 Tab 3    fluticasone propionate (FLONASE) 50 mcg/actuation nasal spray USE 2 SPRAYS IN EACH NOSTRIL DAILY 48 g 4    Bifidobacterium Infantis (ALIGN) 4 mg cap Take 1 Cap by mouth daily.  colestipol (COLESTID) 1 gram tablet 1 PO bid 180 Tab 3    LORazepam (ATIVAN) 1 mg tablet TAKE ONE HALF (1/2) TABLET(S) EVERY 8HOURS AS NEEDED FOR ANXIETY. 30 Tab 0    acetaminophen (TYLENOL EXTRA STRENGTH) 500 mg tablet Take  by mouth every six (6) hours as needed for Pain.  finasteride (PROSCAR) 5 mg tablet Take 5 mg by mouth daily (with dinner).        Allergies   Allergen Reactions    Hydrocodone Other (comments)     WEIRD DREAMS       Family History   Problem Relation Age of Onset    Stroke Father     Diabetes Brother     Kidney Disease Brother     Cancer Brother         lung    Cancer Mother         BREAST WITH METS TO LUNG    Heart Disease Mother     Lung Disease Sister     Cancer Sister         COLON    Cancer Sister         MELANOMA    Heart Attack Sister      Social History     Tobacco Use    Smoking status: Never Smoker    Smokeless tobacco: Never Used   Substance Use Topics    Alcohol use: No     Alcohol/week: 0.0 standard drinks     Comment: RARELY

## 2020-11-13 DIAGNOSIS — R73.01 IMPAIRED FASTING GLUCOSE: ICD-10-CM

## 2020-11-13 DIAGNOSIS — R03.0 ELEVATED BLOOD PRESSURE READING WITHOUT DIAGNOSIS OF HYPERTENSION: ICD-10-CM

## 2020-11-13 DIAGNOSIS — Z79.899 ENCOUNTER FOR LONG-TERM (CURRENT) USE OF MEDICATIONS: ICD-10-CM

## 2020-11-13 DIAGNOSIS — E06.3 ACQUIRED AUTOIMMUNE HYPOTHYROIDISM: ICD-10-CM

## 2020-11-13 DIAGNOSIS — E78.2 MIXED HYPERLIPIDEMIA: ICD-10-CM

## 2020-11-13 LAB
ALBUMIN SERPL-MCNC: 4 G/DL (ref 3.5–5)
ALBUMIN/GLOB SERPL: 1.2 {RATIO} (ref 1.1–2.2)
ALP SERPL-CCNC: 56 U/L (ref 45–117)
ALT SERPL-CCNC: 17 U/L (ref 12–78)
ANION GAP SERPL CALC-SCNC: 4 MMOL/L (ref 5–15)
APPEARANCE UR: CLEAR
AST SERPL-CCNC: 15 U/L (ref 15–37)
BACTERIA URNS QL MICRO: NEGATIVE /HPF
BASOPHILS # BLD: 0 K/UL (ref 0–0.1)
BASOPHILS NFR BLD: 0 % (ref 0–1)
BILIRUB DIRECT SERPL-MCNC: 0.2 MG/DL (ref 0–0.2)
BILIRUB SERPL-MCNC: 0.4 MG/DL (ref 0.2–1)
BILIRUB UR QL: NEGATIVE
BUN SERPL-MCNC: 26 MG/DL (ref 6–20)
BUN/CREAT SERPL: 25 (ref 12–20)
CALCIUM SERPL-MCNC: 8.5 MG/DL (ref 8.5–10.1)
CHLORIDE SERPL-SCNC: 106 MMOL/L (ref 97–108)
CHOLEST SERPL-MCNC: 127 MG/DL
CO2 SERPL-SCNC: 29 MMOL/L (ref 21–32)
COLOR UR: ABNORMAL
CREAT SERPL-MCNC: 1.06 MG/DL (ref 0.7–1.3)
DIFFERENTIAL METHOD BLD: ABNORMAL
EOSINOPHIL # BLD: 0 K/UL (ref 0–0.4)
EOSINOPHIL NFR BLD: 0 % (ref 0–7)
EPITH CASTS URNS QL MICRO: ABNORMAL /LPF
ERYTHROCYTE [DISTWIDTH] IN BLOOD BY AUTOMATED COUNT: 12.4 % (ref 11.5–14.5)
EST. AVERAGE GLUCOSE BLD GHB EST-MCNC: 117 MG/DL
GLOBULIN SER CALC-MCNC: 3.3 G/DL (ref 2–4)
GLUCOSE SERPL-MCNC: 86 MG/DL (ref 65–100)
GLUCOSE UR STRIP.AUTO-MCNC: NEGATIVE MG/DL
HBA1C MFR BLD: 5.7 % (ref 4–5.6)
HCT VFR BLD AUTO: 34.9 % (ref 36.6–50.3)
HDLC SERPL-MCNC: 50 MG/DL
HDLC SERPL: 2.5 {RATIO} (ref 0–5)
HGB BLD-MCNC: 11.2 G/DL (ref 12.1–17)
HGB UR QL STRIP: ABNORMAL
IMM GRANULOCYTES # BLD AUTO: 0.1 K/UL (ref 0–0.04)
IMM GRANULOCYTES NFR BLD AUTO: 1 % (ref 0–0.5)
KETONES UR QL STRIP.AUTO: NEGATIVE MG/DL
LDLC SERPL CALC-MCNC: 58.6 MG/DL (ref 0–100)
LEUKOCYTE ESTERASE UR QL STRIP.AUTO: NEGATIVE
LIPID PROFILE,FLP: NORMAL
LYMPHOCYTES # BLD: 1.4 K/UL (ref 0.8–3.5)
LYMPHOCYTES NFR BLD: 27 % (ref 12–49)
MCH RBC QN AUTO: 30 PG (ref 26–34)
MCHC RBC AUTO-ENTMCNC: 32.1 G/DL (ref 30–36.5)
MCV RBC AUTO: 93.6 FL (ref 80–99)
MONOCYTES # BLD: 1.5 K/UL (ref 0–1)
MONOCYTES NFR BLD: 28 % (ref 5–13)
NEUTS SEG # BLD: 2.3 K/UL (ref 1.8–8)
NEUTS SEG NFR BLD: 44 % (ref 32–75)
NITRITE UR QL STRIP.AUTO: NEGATIVE
NRBC # BLD: 0 K/UL (ref 0–0.01)
NRBC BLD-RTO: 0 PER 100 WBC
PH UR STRIP: 7 [PH] (ref 5–8)
PLATELET # BLD AUTO: 166 K/UL (ref 150–400)
PMV BLD AUTO: 10.3 FL (ref 8.9–12.9)
POTASSIUM SERPL-SCNC: 4.4 MMOL/L (ref 3.5–5.1)
PROT SERPL-MCNC: 7.3 G/DL (ref 6.4–8.2)
PROT UR STRIP-MCNC: ABNORMAL MG/DL
RBC # BLD AUTO: 3.73 M/UL (ref 4.1–5.7)
RBC #/AREA URNS HPF: ABNORMAL /HPF (ref 0–5)
RBC MORPH BLD: ABNORMAL
SODIUM SERPL-SCNC: 139 MMOL/L (ref 136–145)
SP GR UR REFRACTOMETRY: 1.02 (ref 1–1.03)
TRIGL SERPL-MCNC: 92 MG/DL (ref ?–150)
TSH SERPL DL<=0.05 MIU/L-ACNC: 3.53 UIU/ML (ref 0.36–3.74)
UROBILINOGEN UR QL STRIP.AUTO: 0.2 EU/DL (ref 0.2–1)
VLDLC SERPL CALC-MCNC: 18.4 MG/DL
WBC # BLD AUTO: 5.3 K/UL (ref 4.1–11.1)
WBC URNS QL MICRO: ABNORMAL /HPF (ref 0–4)

## 2020-11-25 DIAGNOSIS — R19.7 DIARRHEA FOLLOWING GASTROINTESTINAL SURGERY: ICD-10-CM

## 2020-11-25 DIAGNOSIS — Z98.890 DIARRHEA FOLLOWING GASTROINTESTINAL SURGERY: ICD-10-CM

## 2020-11-25 RX ORDER — MONTELUKAST SODIUM 4 MG/1
TABLET, CHEWABLE ORAL
Qty: 180 TAB | Refills: 3 | Status: SHIPPED | OUTPATIENT
Start: 2020-11-25 | End: 2021-02-01 | Stop reason: SDUPTHER

## 2021-02-01 DIAGNOSIS — R19.7 DIARRHEA FOLLOWING GASTROINTESTINAL SURGERY: ICD-10-CM

## 2021-02-01 DIAGNOSIS — Z98.890 DIARRHEA FOLLOWING GASTROINTESTINAL SURGERY: ICD-10-CM

## 2021-02-01 RX ORDER — MONTELUKAST SODIUM 4 MG/1
TABLET, CHEWABLE ORAL
Qty: 180 TAB | Refills: 2 | Status: SHIPPED | COMMUNITY
Start: 2021-02-01 | End: 2021-11-24 | Stop reason: SDUPTHER

## 2021-02-01 NOTE — TELEPHONE ENCOUNTER
Requested Prescriptions     Pending Prescriptions Disp Refills    colestipoL (COLESTID) 1 gram tablet 180 Tab 3     Si PO every day                 [address], [address] [facility] - [address] [place] [address]

## 2021-04-20 DIAGNOSIS — R09.81 NASAL CONGESTION: ICD-10-CM

## 2021-04-20 RX ORDER — FLUTICASONE PROPIONATE 50 MCG
SPRAY, SUSPENSION (ML) NASAL
Qty: 48 G | Refills: 3 | Status: SHIPPED | OUTPATIENT
Start: 2021-04-20 | End: 2022-03-24

## 2021-05-20 ENCOUNTER — OFFICE VISIT (OUTPATIENT)
Dept: INTERNAL MEDICINE CLINIC | Age: 86
End: 2021-05-20
Payer: MEDICARE

## 2021-05-20 VITALS
HEART RATE: 75 BPM | RESPIRATION RATE: 20 BRPM | TEMPERATURE: 98.4 F | HEIGHT: 72 IN | BODY MASS INDEX: 23.89 KG/M2 | SYSTOLIC BLOOD PRESSURE: 110 MMHG | WEIGHT: 176.4 LBS | OXYGEN SATURATION: 96 % | DIASTOLIC BLOOD PRESSURE: 64 MMHG

## 2021-05-20 DIAGNOSIS — R03.0 ELEVATED BLOOD PRESSURE READING WITHOUT DIAGNOSIS OF HYPERTENSION: ICD-10-CM

## 2021-05-20 DIAGNOSIS — F41.1 ANXIETY STATE: ICD-10-CM

## 2021-05-20 DIAGNOSIS — E06.3 ACQUIRED AUTOIMMUNE HYPOTHYROIDISM: Primary | ICD-10-CM

## 2021-05-20 DIAGNOSIS — R73.01 IMPAIRED FASTING GLUCOSE: ICD-10-CM

## 2021-05-20 DIAGNOSIS — E78.2 MIXED HYPERLIPIDEMIA: ICD-10-CM

## 2021-05-20 PROCEDURE — 99214 OFFICE O/P EST MOD 30 MIN: CPT | Performed by: INTERNAL MEDICINE

## 2021-05-20 PROCEDURE — G9717 DOC PT DX DEP/BP F/U NT REQ: HCPCS | Performed by: INTERNAL MEDICINE

## 2021-05-20 PROCEDURE — 1101F PT FALLS ASSESS-DOCD LE1/YR: CPT | Performed by: INTERNAL MEDICINE

## 2021-05-20 PROCEDURE — G0463 HOSPITAL OUTPT CLINIC VISIT: HCPCS | Performed by: INTERNAL MEDICINE

## 2021-05-20 PROCEDURE — G8536 NO DOC ELDER MAL SCRN: HCPCS | Performed by: INTERNAL MEDICINE

## 2021-05-20 PROCEDURE — G8427 DOCREV CUR MEDS BY ELIG CLIN: HCPCS | Performed by: INTERNAL MEDICINE

## 2021-05-20 PROCEDURE — G8420 CALC BMI NORM PARAMETERS: HCPCS | Performed by: INTERNAL MEDICINE

## 2021-05-20 NOTE — PROGRESS NOTES
HISTORY OF PRESENT ILLNESS  Adrian Aguilar is a 80 y.o. male. HPI  Assessment. Carlos Treadwell is seen today for follow up of hypothyroidism and other medical problems. 1) Hypothyroidism, IFG. Reviewed labs, see below for new medication adjustments for prescription meds. 2) Anxiety. Stable on current medication regimen. He does not require lorazepam.   3) Elevated blood pressure without hypertension. Stable without treatment. 4) Preventive medicine. Wellness Visit in six months. Review of Systems   Constitutional: Negative for weight loss. Respiratory: Negative. Cardiovascular: Negative for chest pain, palpitations, leg swelling and PND. Musculoskeletal: Positive for joint pain. Negative for myalgias. Hips feel loose at times, declines ortho   Neurological: Negative for focal weakness. Physical Exam    ASSESSMENT and PLAN  Diagnoses and all orders for this visit:    1. Acquired autoimmune hypothyroidism  -     TSH 3RD GENERATION; Future    2. Mixed hyperlipidemia  -     HEPATIC FUNCTION PANEL; Future    3. Elevated blood pressure reading without diagnosis of hypertension  -     METABOLIC PANEL, BASIC; Future    4. Impaired fasting glucose  -     HEMOGLOBIN A1C WITH EAG; Future    5.  Anxiety state

## 2021-08-09 DIAGNOSIS — F41.1 ANXIETY STATE: ICD-10-CM

## 2021-08-10 RX ORDER — ESCITALOPRAM OXALATE 10 MG/1
TABLET ORAL
Qty: 90 TABLET | Refills: 3 | Status: SHIPPED | OUTPATIENT
Start: 2021-08-10 | End: 2022-08-19 | Stop reason: SDUPTHER

## 2021-11-24 ENCOUNTER — OFFICE VISIT (OUTPATIENT)
Dept: INTERNAL MEDICINE CLINIC | Age: 86
End: 2021-11-24
Payer: MEDICARE

## 2021-11-24 VITALS
WEIGHT: 172 LBS | DIASTOLIC BLOOD PRESSURE: 68 MMHG | OXYGEN SATURATION: 96 % | HEIGHT: 72 IN | RESPIRATION RATE: 16 BRPM | SYSTOLIC BLOOD PRESSURE: 112 MMHG | BODY MASS INDEX: 23.3 KG/M2 | HEART RATE: 84 BPM | TEMPERATURE: 96.7 F

## 2021-11-24 DIAGNOSIS — Z13.31 SCREENING FOR DEPRESSION: ICD-10-CM

## 2021-11-24 DIAGNOSIS — F41.1 ANXIETY STATE: ICD-10-CM

## 2021-11-24 DIAGNOSIS — R19.7 DIARRHEA FOLLOWING GASTROINTESTINAL SURGERY: ICD-10-CM

## 2021-11-24 DIAGNOSIS — Z00.00 MEDICARE ANNUAL WELLNESS VISIT, SUBSEQUENT: Primary | ICD-10-CM

## 2021-11-24 DIAGNOSIS — E06.3 ACQUIRED AUTOIMMUNE HYPOTHYROIDISM: ICD-10-CM

## 2021-11-24 DIAGNOSIS — E78.2 MIXED HYPERLIPIDEMIA: ICD-10-CM

## 2021-11-24 DIAGNOSIS — Z98.890 DIARRHEA FOLLOWING GASTROINTESTINAL SURGERY: ICD-10-CM

## 2021-11-24 DIAGNOSIS — R73.01 IMPAIRED FASTING GLUCOSE: ICD-10-CM

## 2021-11-24 PROCEDURE — G0439 PPPS, SUBSEQ VISIT: HCPCS | Performed by: INTERNAL MEDICINE

## 2021-11-24 PROCEDURE — G8420 CALC BMI NORM PARAMETERS: HCPCS | Performed by: INTERNAL MEDICINE

## 2021-11-24 PROCEDURE — G8427 DOCREV CUR MEDS BY ELIG CLIN: HCPCS | Performed by: INTERNAL MEDICINE

## 2021-11-24 PROCEDURE — G8536 NO DOC ELDER MAL SCRN: HCPCS | Performed by: INTERNAL MEDICINE

## 2021-11-24 PROCEDURE — 1101F PT FALLS ASSESS-DOCD LE1/YR: CPT | Performed by: INTERNAL MEDICINE

## 2021-11-24 PROCEDURE — G9717 DOC PT DX DEP/BP F/U NT REQ: HCPCS | Performed by: INTERNAL MEDICINE

## 2021-11-24 RX ORDER — MONTELUKAST SODIUM 4 MG/1
TABLET, CHEWABLE ORAL
Qty: 180 TABLET | Refills: 2 | Status: SHIPPED | OUTPATIENT
Start: 2021-11-24

## 2021-11-24 RX ORDER — FINASTERIDE 5 MG/1
5 TABLET, FILM COATED ORAL
Qty: 90 TABLET | Refills: 3 | Status: SHIPPED | OUTPATIENT
Start: 2021-11-24

## 2021-11-24 NOTE — PROGRESS NOTES
Verified name and birth date for privacy precautions. Chart reviewed in preparation for today's visit. Chief Complaint   Patient presents with   [facility] Maintenance Due   Topic    Medicare Yearly Exam          Wt Readings from Last 3 Encounters:   11/24/21 172 lb (78 kg)   05/20/21 176 lb 6.4 oz (80 kg)   11/12/20 178 lb 6.4 oz (80.9 kg)     Temp Readings from Last 3 Encounters:   11/24/21 (!) 96.7 °F (35.9 °C) (Temporal)   05/20/21 98.4 °F (36.9 °C) (Temporal)   11/12/20 97.1 °F (36.2 °C) (Temporal)     BP Readings from Last 3 Encounters:   11/24/21 112/68   05/20/21 110/64   11/12/20 (!) 128/52     Pulse Readings from Last 3 Encounters:   11/24/21 84   05/20/21 75   11/12/20 62         Learning Assessment:  :     Learning Assessment 11/1/2017 5/27/2014 4/14/2014   PRIMARY LEARNER Patient Patient Patient   HIGHEST LEVEL OF EDUCATION - PRIMARY LEARNER  - 4 YEARS OF COLLEGE 4 YEARS OF COLLEGE   BARRIERS PRIMARY LEARNER - NONE NONE   CO-LEARNER CAREGIVER - No No   PRIMARY LANGUAGE ENGLISH ENGLISH ENGLISH   LEARNER PREFERENCE PRIMARY OTHER (COMMENT) OTHER (COMMENT) LISTENING   ANSWERED BY patient patient patient   RELATIONSHIP SELF SELF SELF       Depression Screening:  :     3 most recent PHQ Screens 11/24/2021   Little interest or pleasure in doing things Not at all   Feeling down, depressed, irritable, or hopeless Not at all   Total Score PHQ 2 0       Fall Risk Assessment:  :     Fall Risk Assessment, last 12 mths 11/24/2021   Able to walk? Yes   Fall in past 12 months? 0   Do you feel unsteady? 0   Are you worried about falling 0   Number of falls in past 12 months -   Fall with injury? -       Abuse Screening:  :     Abuse Screening Questionnaire 11/24/2021   Do you ever feel afraid of your partner? N   Are you in a relationship with someone who physically or mentally threatens you? N   Is it safe for you to go home?  Pola Huffman

## 2021-11-24 NOTE — PROGRESS NOTES
HISTORY OF PRESENT ILLNESS  Skyler Barker is a 80 y.o. male. HPI  Assessment: Hayde Del Rosario is seen today for a Medicare Wellness Visit and follow up of chronic problems. Preventive Care: See attached Wellness Visit note. He is due for labs. He is up to date with vaccinations, PSA not indicated given his advanced age. He does follow up with urology still. Chronic Problems Reviewed: He is due for thyroid assessment to assure he is on the proper dosage. BPH is stable and followed by urology. Anxiety is in remission. Review of Systems   Constitutional: Negative for weight loss. HENT: Positive for hearing loss. Worried re wax   Respiratory: Negative. Cardiovascular: Negative for chest pain, palpitations, leg swelling and PND. Gastrointestinal: Negative. Genitourinary: Positive for frequency. Musculoskeletal: Positive for joint pain. Negative for myalgias. Left ring trigger, he prefers to follow   Neurological: Negative for focal weakness. Psychiatric/Behavioral: The patient is nervous/anxious. Physical Exam  Vitals and nursing note reviewed. Constitutional:       General: He is not in acute distress. Appearance: He is well-developed. HENT:      Head: Normocephalic and atraumatic. Right Ear: Tympanic membrane, ear canal and external ear normal.      Left Ear: Tympanic membrane, ear canal and external ear normal.   Eyes:      General:         Right eye: No discharge. Left eye: No discharge. Pupils: Pupils are equal, round, and reactive to light. Neck:      Thyroid: No thyromegaly. Vascular: No carotid bruit. Cardiovascular:      Rate and Rhythm: Normal rate and regular rhythm. Heart sounds: Normal heart sounds. No murmur heard. No friction rub. No gallop. Pulmonary:      Effort: Pulmonary effort is normal. No respiratory distress. Breath sounds: Normal breath sounds. No wheezing or rales.    Abdominal:      General: Bowel sounds are normal. There is no distension. Palpations: Abdomen is soft. There is no mass. Tenderness: There is no abdominal tenderness. There is no guarding or rebound. Musculoskeletal:         General: No tenderness. Normal range of motion. Cervical back: Normal range of motion and neck supple. Lymphadenopathy:      Cervical: No cervical adenopathy. Skin:     General: Skin is warm and dry. Findings: No rash. Neurological:      Mental Status: He is alert and oriented to person, place, and time. Deep Tendon Reflexes: Reflexes are normal and symmetric. Psychiatric:         Behavior: Behavior normal.         ASSESSMENT and PLAN  Diagnoses and all orders for this visit:    1. Medicare annual wellness visit, subsequent    2. Acquired autoimmune hypothyroidism  -     TSH 3RD GENERATION; Future    3. Anxiety state    4. Mixed hyperlipidemia  -     METABOLIC PANEL, COMPREHENSIVE; Future  -     CBC WITH AUTOMATED DIFF; Future  -     LIPID PANEL; Future    5. Diarrhea following gastrointestinal surgery  -     colestipoL (COLESTID) 1 gram tablet; 2 PO every day    6. Impaired fasting glucose  -     HEMOGLOBIN A1C WITH EAG; Future  -     URINALYSIS W/ RFLX MICROSCOPIC; Future    7. Screening for depression  -     Melissa Umaña    Other orders  -     finasteride (Proscar) 5 mg tablet; Take 1 Tablet by mouth daily (with dinner). This is the Subsequent Medicare Annual Wellness Exam, performed 12 months or more after the Initial AWV or the last Subsequent AWV    I have reviewed the patient's medical history in detail and updated the computerized patient record. Assessment/Plan   Education and counseling provided:  Are appropriate based on today's review and evaluation    1. Medicare annual wellness visit, subsequent  2. Acquired autoimmune hypothyroidism  3. Anxiety state  4. Mixed hyperlipidemia  5.  Diarrhea following gastrointestinal surgery  -     colestipoL (COLESTID) 1 gram tablet; 2 PO every day, Normal, Disp-180 Tablet, R-2  6. Impaired fasting glucose  7. Screening for depression  -     DEPRESSION SCREEN ANNUAL       Depression Risk Factor Screening     3 most recent PHQ Screens 11/24/2021   Little interest or pleasure in doing things Not at all   Feeling down, depressed, irritable, or hopeless Not at all   Total Score PHQ 2 0       Alcohol Risk Screen    Do you average more than 1 drink per night or more than 7 drinks a week: No    In the past three months have you have had more than 4 drinks containing alcohol on one occasion: No- 0        Functional Ability and Level of Safety    Hearing: The patient wears hearing aids. Activities of Daily Living: The home contains: no safety equipment. Patient does total self care      Ambulation: with no difficulty     Fall Risk:  Fall Risk Assessment, last 12 mths 11/24/2021   Able to walk? Yes   Fall in past 12 months? 0   Do you feel unsteady? 0   Are you worried about falling 0   Number of falls in past 12 months -   Fall with injury? -      Abuse Screen:  Patient is not abused       Cognitive Screening    Has your family/caregiver stated any concerns about your memory: no         Health Maintenance Due   There are no preventive care reminders to display for this patient.     Patient Care Team   Patient Care Team:  Flory Gallagher MD as PCP - General  Flory Gallagher MD as PCP - [facility]  Jason Scruggs RN as Ambulatory Care Manager  Chiquis Goodman MD (Cardiology)  Adan Phillip DDS as Physician (Dental General Practice)  Lynda Peterson MD as Physician (Ophthalmology)  Phil Marquez MD as Physician (Dermatology)  Leah Rivera MD as Physician (Urology)  Hubert Zavala MD as Physician (Hematology and Oncology)    History     Patient Active Problem List   Diagnosis Code    Personal history of colonic polyps Z86.010    Hyperlipidemia E78.5    Diarrhea R19.7    Shingles B02.9    Raynaud disease I73.00    Palpitations R00.2    Anxiety state F41.1    PVC (premature ventricular contraction) I49.3    Elevated blood pressure reading without diagnosis of hypertension R03.0    BPH (benign prostatic hyperplasia) N40.0    LBBB (left bundle branch block) I44.7    Impaired fasting glucose R73.01    Proteinuria R80.9    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    Situational depression F43.21    Cataract H26.9    Advance directive on file Z78.9    Diarrhea following gastrointestinal surgery R19.7, Z98.890    MGUS (monoclonal gammopathy of unknown significance) D47.2     Past Medical History:   Diagnosis Date    Arrhythmia     PAT, PVC'S LAST ABOUT A YEAR AGO    Arthritis     OSTEO    BPH 7/16/2009    Cancer ([facility] [address]     basal cell skin cancer, MELANOMA    Cataract     bilateral    Cataract     bilateral    Cellulitis 10/15/2018    left hand     Depression 2011    Diarrhea 7/16/2009    Hyperlipidemia 7/16/2009    Hypertension     NOT TREATED    Other ill-defined conditions(799.89)     bph    Other ill-defined conditions(799.89)     colon polyps    Other ill-defined conditions(799.89)     high cholesterol-PT DENIES    Other ill-defined conditions(799.89)     shingles    Other ill-defined conditions(799.89)     raynauds    Palpitations 7/16/2009    Personal history of colonic polyps 7/16/2009    Proteinuria     Psychiatric disorder     ANXIETY AND DEPRESSION    Raynaud disease 7/16/2009    Shingles 7/16/2009    Skin lesions 10/2018    lesions removed by DERM from face and ear     Unspecified sleep apnea     NO CPAP      Past Surgical History:   Procedure Laterality Date    ENDOSCOPY, COLON, DIAGNOSTIC      10, due 15    HX APPENDECTOMY  1987    HX CATARACT REMOVAL  02/22/16    left eye & implant    HX CATARACT REMOVAL  04/25/2016    right eye & implant    HX CHOLECYSTECTOMY  1998    HX COLONOSCOPY      2012    HX HEENT  fall 2016    skin cancer removal - right lower calf -Dr. Dennie Cahill HX OTHER SURGICAL      basal cell cancer removed face and ear    HX TOTAL COLECTOMY  1987    HEMICOLECTOMY     Current Outpatient Medications   Medication Sig Dispense Refill    finasteride (Proscar) 5 mg tablet Take 1 Tablet by mouth daily (with dinner). 90 Tablet 3    colestipoL (COLESTID) 1 gram tablet 2 PO every day 180 Tablet 2    levothyroxine (SYNTHROID) 75 mcg tablet TAKE 1 TABLET DAILY EXCEPT ONE AND ONE-HALF TABLETS ON TUESDAY (NEW DIRECTIONS) 96 Tablet 0    escitalopram oxalate (LEXAPRO) 10 mg tablet TAKE 1 TABLET DAILY FOR ANXIOUSNESS ASSOCIATED WITH DEPRESSION 90 Tablet 3    fluticasone propionate (FLONASE) 50 mcg/actuation nasal spray USE 2 SPRAYS IN EACH NOSTRIL DAILY 48 g 3    Bifidobacterium Infantis (ALIGN) 4 mg cap Take 1 Cap by mouth daily.  acetaminophen (TYLENOL EXTRA STRENGTH) 500 mg tablet Take  by mouth every six (6) hours as needed for Pain.       LORazepam (ATIVAN) 1 mg tablet TAKE ONE HALF (1/2) TABLET(S) EVERY 8HOURS AS NEEDED FOR ANXIETY. (Patient not taking: Reported on 11/24/2021) 30 Tab 0     Allergies   Allergen Reactions    Hydrocodone Other (comments)     WEIRD DREAMS       Family History   Problem Relation Age of Onset    Stroke Father     Diabetes Brother     Kidney Disease Brother     Cancer Brother         lung    Cancer Mother         BREAST WITH METS TO LUNG    Heart Disease Mother     Lung Disease Sister     Cancer Sister         COLON    Cancer Sister         MELANOMA    Heart Attack Sister      Social History     Tobacco Use    Smoking status: Never Smoker    Smokeless tobacco: Never Used   Substance Use Topics    Alcohol use: No     Alcohol/week: 0.0 standard drinks     Comment: Marilee Lo MD

## 2021-11-24 NOTE — PATIENT INSTRUCTIONS
Medicare Wellness Visit, Male    The best way to live healthy is to have a lifestyle where you eat a well-balanced diet, exercise regularly, limit alcohol use, and quit all forms of tobacco/nicotine, if applicable. Regular preventive services are another way to keep healthy. Preventive services (vaccines, screening tests, monitoring & exams) can help personalize your care plan, which helps you manage your own care. Screening tests can find health problems at the earliest stages, when they are easiest to treat. [facility] follows the current, evidence-based guidelines published by the [facility] ([facility]) when recommending preventive services for our patients. Because we follow these guidelines, sometimes recommendations change over time as research supports it. (For example, a prostate screening blood test is no longer routinely recommended for men with no symptoms). Of course, you and your doctor may decide to screen more often for some diseases, based on your risk and co-morbidities (chronic disease you are already diagnosed with). Preventive services for you include:  - Medicare offers their members a free annual wellness visit, which is time for you and your primary care provider to discuss and plan for your preventive service needs. Take advantage of this benefit every year!  -All adults over age 72 should receive the recommended pneumonia vaccines. Current USPSTF guidelines recommend a series of two vaccines for the best pneumonia protection.   -All adults should have a flu vaccine yearly and tetanus vaccine every 10 years.  -All adults age 48 and older should receive the shingles vaccines (series of two vaccines).        -All adults age 38-68 who are overweight should have a diabetes screening test once every three years.   -Other screening tests & preventive services for persons with diabetes include: an eye exam to screen for diabetic retinopathy, a kidney function test, a foot exam, and stricter control over your cholesterol.   -Cardiovascular screening for adults with routine risk involves an electrocardiogram (ECG) at intervals determined by the provider.   -Colorectal cancer screening should be done for adults age 54-65 with no increased risk factors for colorectal cancer. There are a number of acceptable methods of screening for this type of cancer. Each test has its own benefits and drawbacks. Discuss with your provider what is most appropriate for you during your annual wellness visit. The different tests include: colonoscopy (considered the best screening method), a fecal occult blood test, a fecal DNA test, and sigmoidoscopy.  -All adults born between [facility] should be screened once for Hepatitis C.  -An Abdominal Aortic Aneurysm (AAA) Screening is recommended for men age 73-68 who has ever smoked in their lifetime. Here is a list of your current Health Maintenance items (your personalized list of preventive services) with a due date: There are no preventive care reminders to display for this patient.

## 2021-11-30 DIAGNOSIS — E06.3 ACQUIRED AUTOIMMUNE HYPOTHYROIDISM: ICD-10-CM

## 2021-11-30 RX ORDER — LEVOTHYROXINE SODIUM 75 UG/1
TABLET ORAL
Qty: 102 TABLET | Refills: 2 | Status: SHIPPED | OUTPATIENT
Start: 2021-11-30 | End: 2022-02-28

## 2022-02-22 ENCOUNTER — NURSE TRIAGE (OUTPATIENT)
Dept: OTHER | Facility: CLINIC | Age: 87
End: 2022-02-22

## 2022-02-22 NOTE — TELEPHONE ENCOUNTER
Received call from Wood Valdez  at [facility] with Red Flag Complaint. Subjective: Caller states \"He's having SOB that has been worsening for 4-6 weeks. \"     Current Symptoms: SOB with activity, pulse ox 90%, with pulse of 95 after walking from one room to another, after sitting down for about 30 minutes 93-94% and last night 96%. Gets slightly SOB with talking. 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 Also fell a couple of times during the summer. Also fell 12-, was bruised up. Does state he loses balance at times and had headaches recently(states top of head and didn't hurt bad.)    Had stiff neck a couple of weeks ago. Lasted a couple of days. Heat helped. Does have irregular heartbeat. Denies other lung disease(asthma, COPD,emphysema)    Denies wheezing, chest pain,     Onset: At least since November 2021  worsening    Associated Symptoms: reduced activity    Pain Severity: no pain;     Temperature: unknown      What has been tried: n/a      LMP: NA Pregnant: NA    Recommended disposition: to be seen within 2 weeks    Care advice provided, patient verbalizes understanding; denies any other questions or concerns; instructed to call back for any new or worsening symptoms. Patient/Caller agrees with recommended disposition; writer provided warm transfer to Nestor Pritchard at [facility] for appointment scheduling    Attention Provider: Thank you for allowing me to participate in the care of your patient. The patient was connected to triage in response to information provided to the [facility]. Please do not respond through this encounter as the response is not directed to a shared pool.       Reason for Disposition   MILD longstanding difficulty breathing (e.g., speaks in phrases, SOB even at rest, pulse 100-120) and SAME as normal    Protocols used: BREATHING DIFFICULTY-ADULT-[place]

## 2022-02-24 ENCOUNTER — OFFICE VISIT (OUTPATIENT)
Dept: INTERNAL MEDICINE CLINIC | Age: 87
End: 2022-02-24
Payer: MEDICARE

## 2022-02-24 ENCOUNTER — TELEPHONE (OUTPATIENT)
Dept: INTERNAL MEDICINE CLINIC | Age: 87
End: 2022-02-24

## 2022-02-24 VITALS
TEMPERATURE: 97.5 F | WEIGHT: 177 LBS | RESPIRATION RATE: 16 BRPM | BODY MASS INDEX: 23.98 KG/M2 | HEART RATE: 94 BPM | HEIGHT: 72 IN | SYSTOLIC BLOOD PRESSURE: 143 MMHG | DIASTOLIC BLOOD PRESSURE: 82 MMHG | OXYGEN SATURATION: 97 %

## 2022-02-24 DIAGNOSIS — I49.9 CARDIAC ARRHYTHMIA, UNSPECIFIED CARDIAC ARRHYTHMIA TYPE: ICD-10-CM

## 2022-02-24 DIAGNOSIS — R06.02 SOB (SHORTNESS OF BREATH): ICD-10-CM

## 2022-02-24 DIAGNOSIS — R06.02 SOB (SHORTNESS OF BREATH): Primary | ICD-10-CM

## 2022-02-24 DIAGNOSIS — H61.23 BILATERAL IMPACTED CERUMEN: ICD-10-CM

## 2022-02-24 PROCEDURE — 93005 ELECTROCARDIOGRAM TRACING: CPT | Performed by: INTERNAL MEDICINE

## 2022-02-24 PROCEDURE — 69209 REMOVE IMPACTED EAR WAX UNI: CPT | Performed by: INTERNAL MEDICINE

## 2022-02-24 PROCEDURE — G8427 DOCREV CUR MEDS BY ELIG CLIN: HCPCS | Performed by: INTERNAL MEDICINE

## 2022-02-24 PROCEDURE — G0463 HOSPITAL OUTPT CLINIC VISIT: HCPCS | Performed by: INTERNAL MEDICINE

## 2022-02-24 PROCEDURE — 99214 OFFICE O/P EST MOD 30 MIN: CPT | Performed by: INTERNAL MEDICINE

## 2022-02-24 PROCEDURE — G8536 NO DOC ELDER MAL SCRN: HCPCS | Performed by: INTERNAL MEDICINE

## 2022-02-24 PROCEDURE — 93010 ELECTROCARDIOGRAM REPORT: CPT | Performed by: INTERNAL MEDICINE

## 2022-02-24 PROCEDURE — G8420 CALC BMI NORM PARAMETERS: HCPCS | Performed by: INTERNAL MEDICINE

## 2022-02-24 PROCEDURE — G9717 DOC PT DX DEP/BP F/U NT REQ: HCPCS | Performed by: INTERNAL MEDICINE

## 2022-02-24 PROCEDURE — 1101F PT FALLS ASSESS-DOCD LE1/YR: CPT | Performed by: INTERNAL MEDICINE

## 2022-02-24 NOTE — TELEPHONE ENCOUNTER
Faxed pt's order for over night pulse oximetry along with demographics and office note to [facility] [phone]. Confirmation received. Called pt's wife Elena Ask (on HIPAA) and advised her this has been done. Someone from [facility] will be in contact with her about bring equipment for over night testing. If she does not hear from them by early next week she should call me to let me know.  Will forward to MD.

## 2022-02-24 NOTE — PROGRESS NOTES
Verified name and birth date for privacy precautions. Chart reviewed in preparation for today's visit. Chief Complaint   Patient presents with    Breathing Problem          There are no preventive care reminders to display for this patient. Wt Readings from Last 3 Encounters:   02/24/22 177 lb (80.3 kg)   11/24/21 172 lb (78 kg)   05/20/21 176 lb 6.4 oz (80 kg)     Temp Readings from Last 3 Encounters:   02/24/22 97.5 °F (36.4 °C) (Temporal)   11/24/21 (!) 96.7 °F (35.9 °C) (Temporal)   05/20/21 98.4 °F (36.9 °C) (Temporal)     BP Readings from Last 3 Encounters:   02/24/22 (!) 143/82   11/24/21 112/68   05/20/21 110/64     Pulse Readings from Last 3 Encounters:   02/24/22 94   11/24/21 84   05/20/21 75         Learning Assessment:  :     Learning Assessment 11/1/2017 5/27/2014 4/14/2014   PRIMARY LEARNER Patient Patient Patient   HIGHEST LEVEL OF EDUCATION - PRIMARY LEARNER  - 4 YEARS OF COLLEGE 4 YEARS OF COLLEGE   BARRIERS PRIMARY LEARNER - NONE NONE   CO-LEARNER CAREGIVER - No No   PRIMARY LANGUAGE ENGLISH ENGLISH ENGLISH   LEARNER PREFERENCE PRIMARY OTHER (COMMENT) OTHER (COMMENT) LISTENING   ANSWERED BY patient patient patient   RELATIONSHIP SELF SELF SELF       Depression Screening:  :     3 most recent PHQ Screens 2/24/2022   Little interest or pleasure in doing things Not at all   Feeling down, depressed, irritable, or hopeless Not at all   Total Score PHQ 2 0       Fall Risk Assessment:  :     Fall Risk Assessment, last 12 mths 2/24/2022   Able to walk? Yes   Fall in past 12 months? 1   Do you feel unsteady? 1   Are you worried about falling 0   Number of falls in past 12 months 2   Fall with injury? 1       Abuse Screening:  :     Abuse Screening Questionnaire 2/24/2022 11/24/2021   Do you ever feel afraid of your partner? N N   Are you in a relationship with someone who physically or mentally threatens you? N N   Is it safe for you to go home?  Jessica Bynum

## 2022-02-25 ENCOUNTER — HOSPITAL ENCOUNTER (OUTPATIENT)
Dept: GENERAL RADIOLOGY | Age: 87
Discharge: HOME | End: 2022-02-25
Attending: INTERNAL MEDICINE
Payer: MEDICARE

## 2022-02-25 DIAGNOSIS — R06.02 SOB (SHORTNESS OF BREATH): ICD-10-CM

## 2022-02-25 DIAGNOSIS — I49.9 CARDIAC ARRHYTHMIA, UNSPECIFIED CARDIAC ARRHYTHMIA TYPE: ICD-10-CM

## 2022-02-25 LAB
ALBUMIN SERPL-MCNC: 3.9 G/DL (ref 3.5–5)
ALBUMIN/GLOB SERPL: 1.1 {RATIO} (ref 1.1–2.2)
ALP SERPL-CCNC: 62 U/L (ref 45–117)
ALT SERPL-CCNC: 19 U/L (ref 12–78)
ANION GAP SERPL CALC-SCNC: 4 MMOL/L (ref 5–15)
AST SERPL-CCNC: 11 U/L (ref 15–37)
BASOPHILS # BLD: 0 K/UL (ref 0–0.1)
BASOPHILS NFR BLD: 0 % (ref 0–1)
BILIRUB SERPL-MCNC: 0.4 MG/DL (ref 0.2–1)
BUN SERPL-MCNC: 24 MG/DL (ref 6–20)
BUN/CREAT SERPL: 18 (ref 12–20)
CALCIUM SERPL-MCNC: 8.9 MG/DL (ref 8.5–10.1)
CHLORIDE SERPL-SCNC: 110 MMOL/L (ref 97–108)
CO2 SERPL-SCNC: 26 MMOL/L (ref 21–32)
CREAT SERPL-MCNC: 1.35 MG/DL (ref 0.7–1.3)
DIFFERENTIAL METHOD BLD: ABNORMAL
EOSINOPHIL # BLD: 0.1 K/UL (ref 0–0.4)
EOSINOPHIL NFR BLD: 1 % (ref 0–7)
ERYTHROCYTE [DISTWIDTH] IN BLOOD BY AUTOMATED COUNT: 13 % (ref 11.5–14.5)
GLOBULIN SER CALC-MCNC: 3.7 G/DL (ref 2–4)
GLUCOSE SERPL-MCNC: 88 MG/DL (ref 65–100)
HCT VFR BLD AUTO: 35.9 % (ref 36.6–50.3)
HGB BLD-MCNC: 11.3 G/DL (ref 12.1–17)
IMM GRANULOCYTES # BLD AUTO: 0.1 K/UL (ref 0–0.04)
IMM GRANULOCYTES NFR BLD AUTO: 1 % (ref 0–0.5)
LYMPHOCYTES # BLD: 1.5 K/UL (ref 0.8–3.5)
LYMPHOCYTES NFR BLD: 18 % (ref 12–49)
MCH RBC QN AUTO: 30.2 PG (ref 26–34)
MCHC RBC AUTO-ENTMCNC: 31.5 G/DL (ref 30–36.5)
MCV RBC AUTO: 96 FL (ref 80–99)
MONOCYTES # BLD: 2.3 K/UL (ref 0–1)
MONOCYTES NFR BLD: 27 % (ref 5–13)
NEUTS SEG # BLD: 4.4 K/UL (ref 1.8–8)
NEUTS SEG NFR BLD: 53 % (ref 32–75)
NRBC # BLD: 0 K/UL (ref 0–0.01)
NRBC BLD-RTO: 0 PER 100 WBC
PLATELET # BLD AUTO: 168 K/UL (ref 150–400)
PMV BLD AUTO: 11.6 FL (ref 8.9–12.9)
POTASSIUM SERPL-SCNC: 4.4 MMOL/L (ref 3.5–5.1)
PROT SERPL-MCNC: 7.6 G/DL (ref 6.4–8.2)
RBC # BLD AUTO: 3.74 M/UL (ref 4.1–5.7)
RBC MORPH BLD: ABNORMAL
SODIUM SERPL-SCNC: 140 MMOL/L (ref 136–145)
TSH SERPL DL<=0.05 MIU/L-ACNC: 4.48 UIU/ML (ref 0.36–3.74)
WBC # BLD AUTO: 8.4 K/UL (ref 4.1–11.1)

## 2022-02-25 PROCEDURE — 71046 X-RAY EXAM CHEST 2 VIEWS: CPT

## 2022-02-28 ENCOUNTER — TELEPHONE (OUTPATIENT)
Dept: INTERNAL MEDICINE CLINIC | Age: 87
End: 2022-02-28

## 2022-02-28 DIAGNOSIS — E06.3 ACQUIRED AUTOIMMUNE HYPOTHYROIDISM: ICD-10-CM

## 2022-02-28 RX ORDER — CEFUROXIME AXETIL 500 MG/1
500 TABLET ORAL 2 TIMES DAILY
Qty: 20 TABLET | Refills: 0 | Status: SHIPPED | OUTPATIENT
Start: 2022-02-28 | End: 2022-03-10

## 2022-02-28 RX ORDER — LEVOTHYROXINE SODIUM 88 UG/1
TABLET ORAL
Qty: 30 TABLET | Refills: 5 | Status: SHIPPED | OUTPATIENT
Start: 2022-02-28 | End: 2022-08-19 | Stop reason: SDUPTHER

## 2022-02-28 RX ORDER — CEFUROXIME AXETIL 500 MG/1
500 TABLET ORAL 2 TIMES DAILY
Qty: 20 TABLET | Refills: 0 | Status: SHIPPED | OUTPATIENT
Start: 2022-02-28 | End: 2022-02-28 | Stop reason: SDUPTHER

## 2022-02-28 NOTE — TELEPHONE ENCOUNTER
Reviewed lab , cxr  - Increase levothyroxine, jayson 3-6 mos  - abx to cover possible pneumonia, repeat in 3 wks  - Elevated creatinine , will jayson 3 wks  - Await O2 overnight readings. If sx persist despite addressing above may need to See cardiologist as directed.

## 2022-03-01 ENCOUNTER — TELEPHONE (OUTPATIENT)
Dept: INTERNAL MEDICINE CLINIC | Age: 87
End: 2022-03-01

## 2022-03-01 NOTE — TELEPHONE ENCOUNTER
Spoke with Enoch Krishna at [facility]. Advised her MD received fax from them needing new script for over night oximetry indicating how he wants test performed? She states that is on room air or is pt on oxygen? She requested I fax back to their Over Night Oximetry Dept [phone]. Asked her if MD can add room air to original order and date and initial? She said that would be fine. Faxed updated order. Confirmation received.

## 2022-03-16 ENCOUNTER — TELEPHONE (OUTPATIENT)
Dept: INTERNAL MEDICINE CLINIC | Age: 87
End: 2022-03-16

## 2022-03-16 DIAGNOSIS — R06.02 SOB (SHORTNESS OF BREATH): ICD-10-CM

## 2022-03-16 DIAGNOSIS — G47.33 OBSTRUCTIVE SLEEP APNEA: Primary | ICD-10-CM

## 2022-03-16 NOTE — TELEPHONE ENCOUNTER
Faxed O2 order, oximetry result and office notes to [facility] [phone]. Confirmation received.

## 2022-03-19 PROBLEM — D47.2 MGUS (MONOCLONAL GAMMOPATHY OF UNKNOWN SIGNIFICANCE): Status: ACTIVE | Noted: 2018-04-10

## 2022-03-19 PROBLEM — R19.7 DIARRHEA FOLLOWING GASTROINTESTINAL SURGERY: Status: ACTIVE | Noted: 2017-11-01

## 2022-03-19 PROBLEM — Z98.890 DIARRHEA FOLLOWING GASTROINTESTINAL SURGERY: Status: ACTIVE | Noted: 2017-11-01

## 2022-03-24 ENCOUNTER — OFFICE VISIT (OUTPATIENT)
Dept: INTERNAL MEDICINE CLINIC | Age: 87
End: 2022-03-24
Attending: INTERNAL MEDICINE
Payer: MEDICARE

## 2022-03-24 ENCOUNTER — HOSPITAL ENCOUNTER (OUTPATIENT)
Dept: GENERAL RADIOLOGY | Age: 87
Discharge: HOME | End: 2022-03-24
Attending: INTERNAL MEDICINE
Payer: MEDICARE

## 2022-03-24 VITALS
WEIGHT: 175.8 LBS | RESPIRATION RATE: 20 BRPM | SYSTOLIC BLOOD PRESSURE: 123 MMHG | HEIGHT: 72 IN | TEMPERATURE: 98.2 F | HEART RATE: 100 BPM | OXYGEN SATURATION: 96 % | BODY MASS INDEX: 23.81 KG/M2 | DIASTOLIC BLOOD PRESSURE: 72 MMHG

## 2022-03-24 DIAGNOSIS — R93.89 ABNORMAL CHEST X-RAY: ICD-10-CM

## 2022-03-24 DIAGNOSIS — R09.81 CHRONIC NASAL CONGESTION: ICD-10-CM

## 2022-03-24 DIAGNOSIS — R06.02 SOB (SHORTNESS OF BREATH): Primary | ICD-10-CM

## 2022-03-24 DIAGNOSIS — R06.02 SOB (SHORTNESS OF BREATH): ICD-10-CM

## 2022-03-24 DIAGNOSIS — G47.34 NOCTURNAL HYPOXEMIA: ICD-10-CM

## 2022-03-24 PROCEDURE — 71046 X-RAY EXAM CHEST 2 VIEWS: CPT

## 2022-03-24 PROCEDURE — 99213 OFFICE O/P EST LOW 20 MIN: CPT | Performed by: INTERNAL MEDICINE

## 2022-03-24 PROCEDURE — G9717 DOC PT DX DEP/BP F/U NT REQ: HCPCS | Performed by: INTERNAL MEDICINE

## 2022-03-24 PROCEDURE — G8420 CALC BMI NORM PARAMETERS: HCPCS | Performed by: INTERNAL MEDICINE

## 2022-03-24 PROCEDURE — G8427 DOCREV CUR MEDS BY ELIG CLIN: HCPCS | Performed by: INTERNAL MEDICINE

## 2022-03-24 PROCEDURE — G8536 NO DOC ELDER MAL SCRN: HCPCS | Performed by: INTERNAL MEDICINE

## 2022-03-24 PROCEDURE — 1101F PT FALLS ASSESS-DOCD LE1/YR: CPT | Performed by: INTERNAL MEDICINE

## 2022-03-24 NOTE — PROGRESS NOTES
HISTORY OF PRESENT ILLNESS  Victorino Castro is a 80 y.o. male. HPI  Assessment:  Kalin Romero is seen today for follow up of shortness of breath with fatigue. He is accompanied by his wife, Nora Albert. He had an abnormal chest x-ray. He really notices no difference after a course of antibiotic. His oxygen monitor overnight revealed hypoxia, so he is to be started on nocturnal oxygen. We will repeat the chest x-ray today and based on that result we will decide on a cardiology consultation, but I suspect he will need that. Review of Systems   Respiratory: Positive for shortness of breath. Physical Exam  Vitals and nursing note reviewed. Constitutional:       General: He is not in acute distress. Cardiovascular:      Rate and Rhythm: Normal rate and regular rhythm. Frequent extrasystoles are present. Heart sounds: Murmur heard. Systolic murmur is present with a grade of 1/6. No friction rub. No gallop. Pulmonary:      Effort: Pulmonary effort is normal.      Breath sounds: Decreased breath sounds present. No wheezing or rales. ASSESSMENT and PLAN  Diagnoses and all orders for this visit:    1. SOB (shortness of breath)  -     XR CHEST PA LAT; Future  -     REFERRAL TO CARDIOLOGY    2. Nocturnal hypoxemia    3. Abnormal chest x-ray  -     XR CHEST PA LAT; Future    4.  Chronic nasal congestion  -     REFERRAL TO ENT-OTOLARYNGOLOGY

## 2022-03-27 NOTE — PROGRESS NOTES
The xray is showing mild fluid pockets at the base of the lungs.  This may be due to heart causes so definitely schedule with Dr Svetlana Navarrete and wear oxygen

## 2022-03-28 NOTE — PROGRESS NOTES
Advised pt's wife Teri Macedo - pt's xray is showing mild fluid pockets at the base of the lungs. This may be due to heart causes so definitely schedule with Dr Anamaria Gary and wear oxygen. She states pt has appt with Dr Anamaria Gary. Has not heard from [employer]. Advised her I will call today. [address] Gibson 55 - spoke with Deidre Craig. Advised her O2 order was faxed back on 3/16/22 along with office note and overnight oximetry results. She advised me must have been lost. Do not have it. Resent again as URGENT. Called to see if received. Spoke with [employer]. She will check on this and call me back.

## 2022-04-03 PROBLEM — R06.02 SOB (SHORTNESS OF BREATH): Status: ACTIVE | Noted: 2022-04-03

## 2022-04-04 ENCOUNTER — OFFICE VISIT (OUTPATIENT)
Dept: CARDIOLOGY CLINIC | Age: 87
End: 2022-04-04
Payer: MEDICARE

## 2022-04-04 VITALS
HEART RATE: 64 BPM | DIASTOLIC BLOOD PRESSURE: 70 MMHG | WEIGHT: 177 LBS | RESPIRATION RATE: 16 BRPM | HEIGHT: 72 IN | OXYGEN SATURATION: 95 % | BODY MASS INDEX: 23.98 KG/M2 | SYSTOLIC BLOOD PRESSURE: 126 MMHG

## 2022-04-04 DIAGNOSIS — I49.3 PVC (PREMATURE VENTRICULAR CONTRACTION): ICD-10-CM

## 2022-04-04 DIAGNOSIS — I44.7 LBBB (LEFT BUNDLE BRANCH BLOCK): Primary | ICD-10-CM

## 2022-04-04 DIAGNOSIS — R06.02 SOB (SHORTNESS OF BREATH): ICD-10-CM

## 2022-04-04 DIAGNOSIS — E78.2 MIXED HYPERLIPIDEMIA: ICD-10-CM

## 2022-04-04 PROCEDURE — 99214 OFFICE O/P EST MOD 30 MIN: CPT | Performed by: SPECIALIST

## 2022-04-04 PROCEDURE — G0463 HOSPITAL OUTPT CLINIC VISIT: HCPCS | Performed by: SPECIALIST

## 2022-04-04 RX ORDER — FUROSEMIDE 20 MG/1
20 TABLET ORAL DAILY
Qty: 30 TABLET | Refills: 3 | Status: SHIPPED | OUTPATIENT
Start: 2022-04-04 | End: 2022-05-06 | Stop reason: ALTCHOICE

## 2022-04-04 NOTE — PROGRESS NOTES
HISTORY OF PRESENT ILLNESS  Kyle Gibson is a 80 y.o. male     SUMMARY:   Problem List  Date Reviewed: 4/4/2022          Codes Class Noted    SOB (shortness of breath) ICD-10-CM: R06.02  ICD-9-CM: 786.05  4/3/2022        MGUS (monoclonal gammopathy of unknown significance) ICD-10-CM: D47.2  ICD-9-CM: 273.1  4/10/2018        Diarrhea following gastrointestinal surgery ICD-10-CM: R19.7, Z98.890  ICD-9-CM: 564.4  11/1/2017        Cataract ICD-10-CM: H26.9  ICD-9-CM: 366.9  Unknown    Overview Signed 4/7/2016  2:24 PM by Maxim Vilchis     bilateral             Advance directive on file ICD-10-CM: Z78.9  ICD-9-CM: V49.89  4/7/2016        Situational depression ICD-10-CM: F43.21  ICD-9-CM: 309.0  7/21/2014        Acquired autoimmune hypothyroidism ICD-10-CM: E06.3  ICD-9-CM: 244.8  4/14/2014        Proteinuria ICD-10-CM: R80.9  ICD-9-CM: 791.0  10/16/2013        Impaired fasting glucose ICD-10-CM: R73.01  ICD-9-CM: 790.21  10/8/2013        LBBB (left bundle branch block) ICD-10-CM: I44.7  ICD-9-CM: 426.3  9/28/2012        BPH (benign prostatic hyperplasia) ICD-10-CM: N40.0  ICD-9-CM: 600.00  9/27/2012        Elevated blood pressure reading without diagnosis of hypertension ICD-10-CM: R03.0  ICD-9-CM: 796.2  9/7/2011        Anxiety state ICD-10-CM: F41.1  ICD-9-CM: 300.00  8/24/2011        PVC (premature ventricular contraction) ICD-10-CM: I49.3  ICD-9-CM: 427.69  8/24/2011        Personal history of colonic polyps ICD-10-CM: Z86.010  ICD-9-CM: V12.72  7/16/2009        Hyperlipidemia ICD-10-CM: E78.5  ICD-9-CM: 272.4  7/16/2009        Diarrhea ICD-10-CM: R19.7  ICD-9-CM: 787.91  7/16/2009        Shingles ICD-10-CM: B02.9  ICD-9-CM: 053.9  7/16/2009        Raynaud disease ICD-10-CM: I73.00  ICD-9-CM: 443.0  7/16/2009        Palpitations ICD-10-CM: R00.2  ICD-9-CM: 785.1  7/16/2009              Current Outpatient Medications on File Prior to Visit   Medication Sig    levothyroxine (SYNTHROID) 88 mcg tablet TAKE 1 TABLET DAILY - new dose    finasteride (Proscar) 5 mg tablet Take 1 Tablet by mouth daily (with dinner).  colestipoL (COLESTID) 1 gram tablet 2 PO every day    escitalopram oxalate (LEXAPRO) 10 mg tablet TAKE 1 TABLET DAILY FOR ANXIOUSNESS ASSOCIATED WITH DEPRESSION    Bifidobacterium Infantis (ALIGN) 4 mg cap Take 1 Cap by mouth daily.  acetaminophen (TYLENOL EXTRA STRENGTH) 500 mg tablet Take  by mouth every six (6) hours as needed for Pain. No current facility-administered medications on file prior to visit. CARDIOLOGY STUDIES TO DATE:  7/11 lvh and mild mr, otherwise normal echo  8/12. Lexiscan reversible inf defect vs attenuation    Chief Complaint   Patient presents with    New Patient     HPI :  He is referred by his primary care for cardiac evaluation. About 10 years ago he was evaluated for left bundle branch block and PVCs, asymptomatic and had an echo and stress test which I reviewed and summarized above. For the last 3 or 4 months he has had progressive dyspnea on exertion. Feels like he cannot do any things he normally likes to do around his house and home. In February he had a chest x-ray which showed a small pleural effusion and questionable pneumonia so he was treated with antibiotics and his symptoms persisted. Then the last month he had another x-ray which showed resolution of the airspace disease but small bilateral effusions. He has not been treated with any diuretics. He does not have any other signs of volume overload and no symptoms suggestive of angina. He is completely asymptomatic with respect to his PVCs. He is a non-smoker there is no history of diabetes or hypertension cholesterols been okay, and family history is negative for premature coronary disease. Most recent EKG is unchanged from prior tracings.   CARDIAC ROS:   negative for chest pain, palpitations, syncope, orthopnea, paroxysmal nocturnal dyspnea, exertional chest pressure/discomfort, claudication, lower extremity edema    Family History   Problem Relation Age of Onset    Stroke Father     Diabetes Brother     Kidney Disease Brother     Cancer Brother         lung    Cancer Mother         BREAST WITH METS TO LUNG    Heart Disease Mother     Lung Disease Sister     Cancer Sister         COLON    Cancer Sister         MELANOMA    Heart Attack Sister        Past Medical History:   Diagnosis Date    Arrhythmia     PAT, PVC'S LAST ABOUT A YEAR AGO    Arthritis     OSTEO    BPH 7/16/2009    Cancer ([facility] [address]     basal cell skin cancer, MELANOMA    Cataract     bilateral    Cataract     bilateral    Cellulitis 10/15/2018    left hand     Depression 2011    Diarrhea 7/16/2009    Hyperlipidemia 7/16/2009    Hypertension     NOT TREATED    Other ill-defined conditions(799.89)     bph    Other ill-defined conditions(799.89)     colon polyps    Other ill-defined conditions(799.89)     high cholesterol-PT DENIES    Other ill-defined conditions(799.89)     shingles    Other ill-defined conditions(799.89)     raynauds    Palpitations 7/16/2009    Personal history of colonic polyps 7/16/2009    Proteinuria     Psychiatric disorder     ANXIETY AND DEPRESSION    Raynaud disease 7/16/2009    Shingles 7/16/2009    Skin lesions 10/2018    lesions removed by DERM from face and ear     Unspecified sleep apnea     NO CPAP       GENERAL ROS:  A comprehensive review of systems was negative except for that written in the HPI.     Visit Vitals  /70   Pulse 64   Resp 16   Ht 5' 11.5\" (1.816 m)   Wt 177 lb (80.3 kg)   SpO2 95%   BMI 24.34 kg/m²       Wt Readings from Last 3 Encounters:   04/04/22 177 lb (80.3 kg)   03/24/22 175 lb 12.8 oz (79.7 kg)   02/24/22 177 lb (80.3 kg)            BP Readings from Last 3 Encounters:   04/04/22 126/70   03/24/22 123/72   02/24/22 (!) 143/82       PHYSICAL EXAM  General appearance: alert, cooperative, no distress, appears stated age  Neurologic: Alert and oriented X 3  Neck: supple, symmetrical, trachea midline, no adenopathy, no carotid bruit and no JVD  Lungs: clear to auscultation bilaterally, decreased breath sounds both bases  Heart: regular rate and rhythm, S1, S2 normal, no murmur, click, rub or gallop  Abdomen: soft, non-tender. Bowel sounds normal. No masses,  no organomegaly  Extremities: extremities normal, atraumatic, no cyanosis or edema  Pulses: 2+ and symmetric    Lab Results   Component Value Date/Time    Cholesterol, total 131 11/29/2021 10:59 AM    Cholesterol, total 127 11/13/2020 09:36 AM    Cholesterol, total 138 11/13/2019 09:58 AM    Cholesterol, total 114 10/30/2018 09:11 AM    Cholesterol, total 134 06/13/2017 01:42 PM    HDL Cholesterol 59 11/29/2021 10:59 AM    HDL Cholesterol 50 11/13/2020 09:36 AM    HDL Cholesterol 60 11/13/2019 09:58 AM    HDL Cholesterol 55 10/30/2018 09:11 AM    HDL Cholesterol 45 06/13/2017 01:42 PM    LDL, calculated 52.4 11/29/2021 10:59 AM    LDL, calculated 58.6 11/13/2020 09:36 AM    LDL, calculated 64 11/13/2019 09:58 AM    LDL, calculated 42 10/30/2018 09:11 AM    LDL, calculated 60 06/13/2017 01:42 PM    Triglyceride 98 11/29/2021 10:59 AM    Triglyceride 92 11/13/2020 09:36 AM    Triglyceride 72 11/13/2019 09:58 AM    Triglyceride 84 10/30/2018 09:11 AM    Triglyceride 146 06/13/2017 01:42 PM    CHOL/HDL Ratio 2.2 11/29/2021 10:59 AM    CHOL/HDL Ratio 2.5 11/13/2020 09:36 AM    CHOL/HDL Ratio 2.8 09/23/2010 10:03 AM    CHOL/HDL Ratio 3.2 09/22/2009 10:15 AM     ASSESSMENT :      He has pleural effusions of unknown etiology in the absence of any signs of volume overload. Whether this is all related to his episode of presumed pneumonia or not is unclear. Given his long history of PVCs and left bundle branch block he needs an echocardiogram to make sure he has not developed a cardiomyopathy.   At some point we may need to consider stress testing or getting a sample of the pleural effusion if there is enough to further discriminate what might be causing it. In the meantime I Cheryle Overcast start him on 20 mg a day Lasix and get a basic metabolic profile in about a week  current treatment plan is effective, no change in therapy  lab results and schedule of future lab studies reviewed with patient  reviewed diet, exercise and weight control    Encounter Diagnoses   Name Primary?  LBBB (left bundle branch block) Yes    PVC (premature ventricular contraction)     SOB (shortness of breath)     Mixed hyperlipidemia      No orders of the defined types were placed in this encounter. Follow-up and Dispositions    · Return if symptoms worsen or fail to improve. Cira Urena MD  4/4/2022  Please note that this dictation was completed with [employer], the computer voice recognition software. Quite often unanticipated grammatical, syntax, homophones, and other interpretive errors are inadvertently transcribed by the computer software. Please disregard these errors. Please excuse any errors that have escaped final proofreading. Thank you.

## 2022-04-04 NOTE — PATIENT INSTRUCTIONS
Start furosemide 20 mg daily (fluid pill). Please have labs drawn in 1 week (do NOT need to fast). Follow up with echo and Dr. Lisha Snider as scheduled.

## 2022-04-11 DIAGNOSIS — I44.7 LBBB (LEFT BUNDLE BRANCH BLOCK): ICD-10-CM

## 2022-04-11 DIAGNOSIS — R06.02 SOB (SHORTNESS OF BREATH): ICD-10-CM

## 2022-04-12 ENCOUNTER — TELEPHONE (OUTPATIENT)
Dept: CARDIOLOGY CLINIC | Age: 87
End: 2022-04-12

## 2022-04-12 LAB
ANION GAP SERPL CALC-SCNC: 7 MMOL/L (ref 5–15)
BUN SERPL-MCNC: 23 MG/DL (ref 6–20)
BUN/CREAT SERPL: 18 (ref 12–20)
CALCIUM SERPL-MCNC: 8.5 MG/DL (ref 8.5–10.1)
CHLORIDE SERPL-SCNC: 106 MMOL/L (ref 97–108)
CO2 SERPL-SCNC: 25 MMOL/L (ref 21–32)
CREAT SERPL-MCNC: 1.3 MG/DL (ref 0.7–1.3)
GLUCOSE SERPL-MCNC: 108 MG/DL (ref 65–100)
POTASSIUM SERPL-SCNC: 4.1 MMOL/L (ref 3.5–5.1)
SODIUM SERPL-SCNC: 138 MMOL/L (ref 136–145)

## 2022-04-12 NOTE — TELEPHONE ENCOUNTER
----- Message from Dez Hobson MD sent at 4/12/2022  8:47 AM EDT -----  Blood work is ok, can continue lasix.

## 2022-04-12 NOTE — TELEPHONE ENCOUNTER
Called and spoke with patient's wife, Jeanna Granados. Verified patient's identity with two identifiers and HIPAA. Notfiied her of pt's lab results and Dr. Jessie Shaffer message. She will notify pt and denied further questions or concerns.

## 2022-05-06 ENCOUNTER — OFFICE VISIT (OUTPATIENT)
Dept: CARDIOLOGY CLINIC | Age: 87
End: 2022-05-06
Payer: MEDICARE

## 2022-05-06 ENCOUNTER — ANCILLARY PROCEDURE (OUTPATIENT)
Dept: CARDIOLOGY CLINIC | Age: 87
End: 2022-05-06
Payer: MEDICARE

## 2022-05-06 VITALS — HEIGHT: 71 IN | WEIGHT: 182 LBS | BODY MASS INDEX: 25.48 KG/M2

## 2022-05-06 VITALS
DIASTOLIC BLOOD PRESSURE: 70 MMHG | BODY MASS INDEX: 25.48 KG/M2 | HEIGHT: 71 IN | SYSTOLIC BLOOD PRESSURE: 132 MMHG | RESPIRATION RATE: 16 BRPM | WEIGHT: 182 LBS | HEART RATE: 86 BPM | OXYGEN SATURATION: 95 %

## 2022-05-06 DIAGNOSIS — R06.02 SOB (SHORTNESS OF BREATH): ICD-10-CM

## 2022-05-06 DIAGNOSIS — I49.3 PVC (PREMATURE VENTRICULAR CONTRACTION): ICD-10-CM

## 2022-05-06 DIAGNOSIS — I44.7 LBBB (LEFT BUNDLE BRANCH BLOCK): ICD-10-CM

## 2022-05-06 DIAGNOSIS — I42.0 DILATED CARDIOMYOPATHY (HCC): Primary | ICD-10-CM

## 2022-05-06 LAB
ECHO AO ASC DIAM: 3.1 CM
ECHO AO ASCENDING AORTA INDEX: 1.53 CM/M2
ECHO AO ROOT DIAM: 3.5 CM
ECHO AO ROOT INDEX: 1.72 CM/M2
ECHO AV AREA PEAK VELOCITY: 2.6 CM2
ECHO AV AREA VTI: 2.7 CM2
ECHO AV AREA/BSA PEAK VELOCITY: 1.3 CM2/M2
ECHO AV AREA/BSA VTI: 1.3 CM2/M2
ECHO AV MEAN GRADIENT: 2 MMHG
ECHO AV MEAN VELOCITY: 0.7 M/S
ECHO AV PEAK GRADIENT: 3 MMHG
ECHO AV PEAK VELOCITY: 0.9 M/S
ECHO AV VELOCITY RATIO: 0.67
ECHO AV VTI: 11.9 CM
ECHO EST RA PRESSURE: 15 MMHG
ECHO LA DIAMETER INDEX: 2.12 CM/M2
ECHO LA DIAMETER: 4.3 CM
ECHO LA TO AORTIC ROOT RATIO: 1.23
ECHO LA VOL 2C: 91 ML (ref 18–58)
ECHO LA VOL 4C: 75 ML (ref 18–58)
ECHO LA VOL BP: 88 ML (ref 18–58)
ECHO LA VOL/BSA BIPLANE: 43 ML/M2 (ref 16–34)
ECHO LA VOLUME AREA LENGTH: 96 ML
ECHO LA VOLUME INDEX A2C: 45 ML/M2 (ref 16–34)
ECHO LA VOLUME INDEX A4C: 37 ML/M2 (ref 16–34)
ECHO LA VOLUME INDEX AREA LENGTH: 47 ML/M2 (ref 16–34)
ECHO LV EDV A2C: 275 ML
ECHO LV EDV A4C: 184 ML
ECHO LV EDV BP: 227 ML (ref 67–155)
ECHO LV EDV INDEX A4C: 91 ML/M2
ECHO LV EDV INDEX BP: 112 ML/M2
ECHO LV EDV NDEX A2C: 135 ML/M2
ECHO LV EJECTION FRACTION A2C: 7 %
ECHO LV EJECTION FRACTION A4C: 15 %
ECHO LV EJECTION FRACTION BIPLANE: 9 % (ref 55–100)
ECHO LV ESV A2C: 254 ML
ECHO LV ESV A4C: 157 ML
ECHO LV ESV BP: 208 ML (ref 22–58)
ECHO LV ESV INDEX A2C: 125 ML/M2
ECHO LV ESV INDEX A4C: 77 ML/M2
ECHO LV ESV INDEX BP: 102 ML/M2
ECHO LV FRACTIONAL SHORTENING: -2 % (ref 28–44)
ECHO LV INTERNAL DIMENSION DIASTOLE INDEX: 2.66 CM/M2
ECHO LV INTERNAL DIMENSION DIASTOLIC: 5.4 CM (ref 4.2–5.9)
ECHO LV INTERNAL DIMENSION SYSTOLIC INDEX: 2.71 CM/M2
ECHO LV INTERNAL DIMENSION SYSTOLIC: 5.5 CM
ECHO LV IVSD: 1.4 CM (ref 0.6–1)
ECHO LV MASS 2D: 328.3 G (ref 88–224)
ECHO LV MASS INDEX 2D: 161.7 G/M2 (ref 49–115)
ECHO LV POSTERIOR WALL DIASTOLIC: 1.4 CM (ref 0.6–1)
ECHO LV RELATIVE WALL THICKNESS RATIO: 0.52
ECHO LVOT AREA: 3.8 CM2
ECHO LVOT AV VTI INDEX: 0.7
ECHO LVOT DIAM: 2.2 CM
ECHO LVOT MEAN GRADIENT: 1 MMHG
ECHO LVOT PEAK GRADIENT: 1 MMHG
ECHO LVOT PEAK VELOCITY: 0.6 M/S
ECHO LVOT STROKE VOLUME INDEX: 15.5 ML/M2
ECHO LVOT SV: 31.5 ML
ECHO LVOT VTI: 8.3 CM
ECHO MV A VELOCITY: 0.86 M/S
ECHO MV E DECELERATION TIME (DT): 131.4 MS
ECHO MV E VELOCITY: 0.77 M/S
ECHO MV E/A RATIO: 0.9
ECHO PV MAX VELOCITY: 0.5 M/S
ECHO PV PEAK GRADIENT: 1 MMHG
ECHO RA MINOR AXIS INDEX: 2.36 CM/M2
ECHO RA MINOR AXIS: 4.8 CM
ECHO RIGHT VENTRICULAR SYSTOLIC PRESSURE (RVSP): 77 MMHG
ECHO RV INTERNAL DIMENSION: 4.5 CM
ECHO RV TAPSE: 1.5 CM (ref 1.7–?)
ECHO TV REGURGITANT MAX VELOCITY: 3.93 M/S
ECHO TV REGURGITANT PEAK GRADIENT: 62 MMHG

## 2022-05-06 PROCEDURE — G9717 DOC PT DX DEP/BP F/U NT REQ: HCPCS | Performed by: SPECIALIST

## 2022-05-06 PROCEDURE — 1101F PT FALLS ASSESS-DOCD LE1/YR: CPT | Performed by: SPECIALIST

## 2022-05-06 PROCEDURE — 93306 TTE W/DOPPLER COMPLETE: CPT | Performed by: SPECIALIST

## 2022-05-06 PROCEDURE — G8427 DOCREV CUR MEDS BY ELIG CLIN: HCPCS | Performed by: SPECIALIST

## 2022-05-06 PROCEDURE — G8419 CALC BMI OUT NRM PARAM NOF/U: HCPCS | Performed by: SPECIALIST

## 2022-05-06 PROCEDURE — G0463 HOSPITAL OUTPT CLINIC VISIT: HCPCS | Performed by: SPECIALIST

## 2022-05-06 PROCEDURE — 99214 OFFICE O/P EST MOD 30 MIN: CPT | Performed by: SPECIALIST

## 2022-05-06 PROCEDURE — G8536 NO DOC ELDER MAL SCRN: HCPCS | Performed by: SPECIALIST

## 2022-05-06 RX ORDER — SACUBITRIL AND VALSARTAN 24; 26 MG/1; MG/1
1 TABLET, FILM COATED ORAL 2 TIMES DAILY
Qty: 28 TABLET | Refills: 0 | Status: SHIPPED | COMMUNITY
Start: 2022-05-06 | End: 2022-05-16

## 2022-05-06 RX ORDER — BUMETANIDE 2 MG/1
2 TABLET ORAL DAILY
Qty: 30 TABLET | Refills: 1 | Status: SHIPPED | OUTPATIENT
Start: 2022-05-06 | End: 2022-06-01 | Stop reason: SDUPTHER

## 2022-05-06 NOTE — PATIENT INSTRUCTIONS
Stop lasix. Start Bumex 2 mg ONCE daily. Start Entresto 24-26 mg TWICE daily. Weight daily and call us early next week with update on how you are doing.

## 2022-05-06 NOTE — PROGRESS NOTES
HISTORY OF PRESENT ILLNESS  Cris Jonas is a 80 y.o. male     SUMMARY:   Problem List  Date Reviewed: 5/6/2022          Codes Class Noted    SOB (shortness of breath) ICD-10-CM: R06.02  ICD-9-CM: 786.05  4/3/2022        MGUS (monoclonal gammopathy of unknown significance) ICD-10-CM: D47.2  ICD-9-CM: 273.1  4/10/2018        Diarrhea following gastrointestinal surgery ICD-10-CM: R19.7, Z98.890  ICD-9-CM: 564.4  11/1/2017        Cataract ICD-10-CM: H26.9  ICD-9-CM: 366.9  Unknown    Overview Signed 4/7/2016  2:24 PM by Delmy Ames     bilateral             Advance directive on file ICD-10-CM: Z78.9  ICD-9-CM: V49.89  4/7/2016        Situational depression ICD-10-CM: F43.21  ICD-9-CM: 309.0  7/21/2014        Acquired autoimmune hypothyroidism ICD-10-CM: E06.3  ICD-9-CM: 244.8  4/14/2014        Proteinuria ICD-10-CM: R80.9  ICD-9-CM: 791.0  10/16/2013        Impaired fasting glucose ICD-10-CM: R73.01  ICD-9-CM: 790.21  10/8/2013        LBBB (left bundle branch block) ICD-10-CM: I44.7  ICD-9-CM: 426.3  9/28/2012        BPH (benign prostatic hyperplasia) ICD-10-CM: N40.0  ICD-9-CM: 600.00  9/27/2012        Elevated blood pressure reading without diagnosis of hypertension ICD-10-CM: R03.0  ICD-9-CM: 796.2  9/7/2011        Anxiety state ICD-10-CM: F41.1  ICD-9-CM: 300.00  8/24/2011        PVC (premature ventricular contraction) ICD-10-CM: I49.3  ICD-9-CM: 427.69  8/24/2011        Personal history of colonic polyps ICD-10-CM: Z86.010  ICD-9-CM: V12.72  7/16/2009        Hyperlipidemia ICD-10-CM: E78.5  ICD-9-CM: 272.4  7/16/2009        Diarrhea ICD-10-CM: R19.7  ICD-9-CM: 787.91  7/16/2009        Shingles ICD-10-CM: B02.9  ICD-9-CM: 053.9  7/16/2009        Raynaud disease ICD-10-CM: I73.00  ICD-9-CM: 443.0  7/16/2009        Palpitations ICD-10-CM: R00.2  ICD-9-CM: 785.1  7/16/2009              Current Outpatient Medications on File Prior to Visit   Medication Sig    furosemide (LASIX) 20 mg tablet Take 1 Tablet by mouth daily.  levothyroxine (SYNTHROID) 88 mcg tablet TAKE 1 TABLET DAILY - new dose    finasteride (Proscar) 5 mg tablet Take 1 Tablet by mouth daily (with dinner).  colestipoL (COLESTID) 1 gram tablet 2 PO every day    escitalopram oxalate (LEXAPRO) 10 mg tablet TAKE 1 TABLET DAILY FOR ANXIOUSNESS ASSOCIATED WITH DEPRESSION    Bifidobacterium Infantis (ALIGN) 4 mg cap Take 1 Cap by mouth daily.  acetaminophen (TYLENOL EXTRA STRENGTH) 500 mg tablet Take  by mouth every six (6) hours as needed for Pain. No current facility-administered medications on file prior to visit. CARDIOLOGY STUDIES TO DATE:  7/11 lvh and mild mr, otherwise normal echo  8/12. Lexiscan reversible inf defect vs attenuation    Chief Complaint   Patient presents with    Follow-up     HPI :  He is no better than when we last met and started him on Lasix and he is actually put on about 5 pounds by our scale and is developed some lower extremity edema. He is wearing oxygen at night though they recently went to [facility] and he decided he did not need to take it. He is very short of breath with minimal activity though his oxygen saturations at rest are normal.  His echo today showed severe LV dysfunction with pleural effusions and significant mitral and tricuspid regurg and a PA pressure of about 60 mm.   CARDIAC ROS:   negative for chest pain, palpitations, syncope, orthopnea, paroxysmal nocturnal dyspnea, exertional chest pressure/discomfort, claudication    Family History   Problem Relation Age of Onset    Stroke Father     Diabetes Brother     Kidney Disease Brother     Cancer Brother         lung    Cancer Mother         BREAST WITH METS TO LUNG    Heart Disease Mother     Lung Disease Sister     Cancer Sister         COLON    Cancer Sister         MELANOMA    Heart Attack Sister        Past Medical History:   Diagnosis Date    Arrhythmia     PAT, PVC'S LAST ABOUT A YEAR AGO    Arthritis     OSTEO  BPH 7/16/2009    Cancer ([facility] [address]     basal cell skin cancer, MELANOMA    Cataract     bilateral    Cataract     bilateral    Cellulitis 10/15/2018    left hand     Depression 2011    Diarrhea 7/16/2009    Hyperlipidemia 7/16/2009    Hypertension     NOT TREATED    Other ill-defined conditions(799.89)     bph    Other ill-defined conditions(799.89)     colon polyps    Other ill-defined conditions(799.89)     high cholesterol-PT DENIES    Other ill-defined conditions(799.89)     shingles    Other ill-defined conditions(799.89)     raynauds    Palpitations 7/16/2009    Personal history of colonic polyps 7/16/2009    Proteinuria     Psychiatric disorder     ANXIETY AND DEPRESSION    Raynaud disease 7/16/2009    Shingles 7/16/2009    Skin lesions 10/2018    lesions removed by DERM from face and ear     Unspecified sleep apnea     NO CPAP       GENERAL ROS:  A comprehensive review of systems was negative except for that written in the HPI.     Visit Vitals  /70   Pulse 86   Resp 16   Ht 5' 11\" (1.803 m)   Wt 182 lb (82.6 kg)   SpO2 95%   BMI 25.38 kg/m²       Wt Readings from Last 3 Encounters:   05/06/22 182 lb (82.6 kg)   05/06/22 182 lb (82.6 kg)   04/04/22 177 lb (80.3 kg)            BP Readings from Last 3 Encounters:   05/06/22 132/70   04/04/22 126/70   03/24/22 123/72       PHYSICAL EXAM  General appearance: alert, cooperative, no distress, appears stated age  Neurologic: Alert and oriented X 3  Neck: supple, symmetrical, trachea midline, no adenopathy, no carotid bruit and no JVD  Lungs: diminished breath sounds R base, L base  Heart: regular rate and rhythm, S1, S2 normal, no S3 or S4, systolic murmur: holosystolic 2/6, blowing at apex  Extremities: edema 1+    Lab Results   Component Value Date/Time    Cholesterol, total 131 11/29/2021 10:59 AM    Cholesterol, total 127 11/13/2020 09:36 AM    Cholesterol, total 138 11/13/2019 09:58 AM    Cholesterol, total 114 10/30/2018 09:11 AM Cholesterol, total 134 06/13/2017 01:42 PM    HDL Cholesterol 59 11/29/2021 10:59 AM    HDL Cholesterol 50 11/13/2020 09:36 AM    HDL Cholesterol 60 11/13/2019 09:58 AM    HDL Cholesterol 55 10/30/2018 09:11 AM    HDL Cholesterol 45 06/13/2017 01:42 PM    LDL, calculated 52.4 11/29/2021 10:59 AM    LDL, calculated 58.6 11/13/2020 09:36 AM    LDL, calculated 64 11/13/2019 09:58 AM    LDL, calculated 42 10/30/2018 09:11 AM    LDL, calculated 60 06/13/2017 01:42 PM    Triglyceride 98 11/29/2021 10:59 AM    Triglyceride 92 11/13/2020 09:36 AM    Triglyceride 72 11/13/2019 09:58 AM    Triglyceride 84 10/30/2018 09:11 AM    Triglyceride 146 06/13/2017 01:42 PM    CHOL/HDL Ratio 2.2 11/29/2021 10:59 AM    CHOL/HDL Ratio 2.5 11/13/2020 09:36 AM    CHOL/HDL Ratio 2.8 09/23/2010 10:03 AM    CHOL/HDL Ratio 3.2 09/22/2009 10:15 AM     ASSESSMENT :      I had a long discussion with him and his wife about the seriousness of his condition. His wife told me today that she is not surprised because she feels like he is gradually getting worse over the last year or so, and we both think that may be what ever pneumonia or episode he had first of the year may have tipped him over. We talked about hospitalization to try to be more aggressive with his medications and monitoring but there declining that at this point. Working to stop his Lasix and put him on Bumex 2 mg a day through the weekend, and add Entresto low-dose twice a day. She can monitor his weight and blood pressure at home and they are going to call back on Monday with both status report. If he is no better or if he is worse at that point I am going to strongly encouraged him to go to the hospital we will see if we can make things any better and get him back to a better quality of life though I told him and his wife that I doubted his heart would ever completely recover.   I also told them to make sure that they had their advanced directives intact and up-to-date with his wishes regarding life support and goals of care. We will check electrolytes after a couple of days of increased diuretic.  current treatment plan is effective, no change in therapy  lab results and schedule of future lab studies reviewed with patient  reviewed diet, exercise and weight control    Encounter Diagnoses   Name Primary?  Dilated cardiomyopathy (HCC) Yes    LBBB (left bundle branch block)     PVC (premature ventricular contraction)     SOB (shortness of breath)      No orders of the defined types were placed in this encounter. Follow-up and Dispositions    · Return in about 6 weeks (around 6/17/2022). Mercy Bello MD  5/6/2022  Please note that this dictation was completed with [employer], the [employer] voice recognition software. Quite often unanticipated grammatical, syntax, homophones, and other interpretive errors are inadvertently transcribed by the computer software. Please disregard these errors. Please excuse any errors that have escaped final proofreading. Thank you.

## 2022-05-09 ENCOUNTER — TELEPHONE (OUTPATIENT)
Dept: CARDIOLOGY CLINIC | Age: 87
End: 2022-05-09

## 2022-05-09 DIAGNOSIS — I42.0 DILATED CARDIOMYOPATHY (HCC): ICD-10-CM

## 2022-05-09 DIAGNOSIS — R06.02 SOB (SHORTNESS OF BREATH): Primary | ICD-10-CM

## 2022-05-09 DIAGNOSIS — R94.30 LOW LEFT VENTRICULAR EJECTION FRACTION: ICD-10-CM

## 2022-05-09 NOTE — TELEPHONE ENCOUNTER
Called Mrs. Sean Rawls. She sted patient has improved. He is moving really well, breathing remarkably improved, and pt watched some TV last night which he hadn't done in months. His BPs: Saturday-126/85, Sunday-133/90, today 118/61. HRs: 56-86. I notified her of Dr. Andreina Vieyra advice. She agreed to take patient to the lab today or tomorrow then we will call with results, will continue monitoring current regimen, and will be expecting call from [facility] to schedule appointment. She denied further questions or concerns.

## 2022-05-09 NOTE — TELEPHONE ENCOUNTER
Patients wife calling with an update for the doctor. After the diuretic, patient lost 8 3/4 lbs in two days. His breathing has improved drastically. He has a little more strength and is moving around a lot better.     [phone]

## 2022-05-10 DIAGNOSIS — I42.0 DILATED CARDIOMYOPATHY (HCC): ICD-10-CM

## 2022-05-10 DIAGNOSIS — R06.02 SOB (SHORTNESS OF BREATH): ICD-10-CM

## 2022-05-11 LAB
ANION GAP SERPL CALC-SCNC: 4 MMOL/L (ref 5–15)
BUN SERPL-MCNC: 29 MG/DL (ref 6–20)
BUN/CREAT SERPL: 20 (ref 12–20)
CALCIUM SERPL-MCNC: 8.8 MG/DL (ref 8.5–10.1)
CHLORIDE SERPL-SCNC: 107 MMOL/L (ref 97–108)
CO2 SERPL-SCNC: 30 MMOL/L (ref 21–32)
CREAT SERPL-MCNC: 1.44 MG/DL (ref 0.7–1.3)
GLUCOSE SERPL-MCNC: 102 MG/DL (ref 65–100)
POTASSIUM SERPL-SCNC: 3.8 MMOL/L (ref 3.5–5.1)
SODIUM SERPL-SCNC: 141 MMOL/L (ref 136–145)

## 2022-05-11 NOTE — PROGRESS NOTES
Kidney function is a little of, go down to 1mg day bumex and continue to watch wait and monitor for sob and edema

## 2022-05-11 NOTE — TELEPHONE ENCOUNTER
Called Mrs. Rina Gonzalez. Notified her of Dr. Yadira Wang message. She will have pt cut bumex in 1/2 and monitor as descried. They have not heard from [facility] so I will message them again and gave her their office #. She denied further questions or concerns.

## 2022-05-11 NOTE — TELEPHONE ENCOUNTER
----- Message from Cira Urena MD sent at 5/11/2022  5:03 PM EDT -----  Kidney function is a little of, go down to 1mg day bumex and continue to watch wait and monitor for sob and edema

## 2022-05-12 ENCOUNTER — TELEPHONE (OUTPATIENT)
Dept: CARDIOLOGY CLINIC | Age: 87
End: 2022-05-12

## 2022-05-12 NOTE — TELEPHONE ENCOUNTER
New referral from Dr. Serg Hay. Called placed to patient and appointment is scheduled with patients wife Zain Kim.     Appointment is scheduled on Monday May 16th at 2pm w/Dr. Catracho Allred

## 2022-05-13 ENCOUNTER — TELEPHONE (OUTPATIENT)
Dept: CARDIOLOGY CLINIC | Age: 87
End: 2022-05-13

## 2022-05-13 NOTE — TELEPHONE ENCOUNTER
Telephone Call RE:  Appointment reminder     Outcome:     [x] Patient confirmed appointment   [] Patient rescheduled appointment for    [] Unable to reach  [] Left message             [] Other:     ---------------------             [x] Screened for [facility]

## 2022-05-16 ENCOUNTER — OFFICE VISIT (OUTPATIENT)
Dept: CARDIOLOGY CLINIC | Age: 87
End: 2022-05-16
Payer: MEDICARE

## 2022-05-16 VITALS
TEMPERATURE: 97.9 F | RESPIRATION RATE: 16 BRPM | HEIGHT: 71 IN | WEIGHT: 166.8 LBS | OXYGEN SATURATION: 98 % | DIASTOLIC BLOOD PRESSURE: 52 MMHG | HEART RATE: 96 BPM | BODY MASS INDEX: 23.35 KG/M2 | SYSTOLIC BLOOD PRESSURE: 90 MMHG

## 2022-05-16 DIAGNOSIS — I42.0 DILATED CARDIOMYOPATHY (HCC): ICD-10-CM

## 2022-05-16 DIAGNOSIS — I44.7 LBBB (LEFT BUNDLE BRANCH BLOCK): ICD-10-CM

## 2022-05-16 DIAGNOSIS — I49.3 PVC (PREMATURE VENTRICULAR CONTRACTION): ICD-10-CM

## 2022-05-16 DIAGNOSIS — E61.1 IRON DEFICIENCY: ICD-10-CM

## 2022-05-16 DIAGNOSIS — I50.22 CHRONIC SYSTOLIC HEART FAILURE (HCC): Primary | ICD-10-CM

## 2022-05-16 DIAGNOSIS — R53.83 FATIGUE, UNSPECIFIED TYPE: ICD-10-CM

## 2022-05-16 DIAGNOSIS — E53.8 VITAMIN B 12 DEFICIENCY: ICD-10-CM

## 2022-05-16 DIAGNOSIS — E55.9 VITAMIN D DEFICIENCY: ICD-10-CM

## 2022-05-16 DIAGNOSIS — R06.02 SOB (SHORTNESS OF BREATH): ICD-10-CM

## 2022-05-16 PROCEDURE — 94618 PULMONARY STRESS TESTING: CPT | Performed by: INTERNAL MEDICINE

## 2022-05-16 PROCEDURE — 99203 OFFICE O/P NEW LOW 30 MIN: CPT | Performed by: INTERNAL MEDICINE

## 2022-05-16 RX ORDER — FLUTICASONE PROPIONATE 50 MCG
2 SPRAY, SUSPENSION (ML) NASAL DAILY
COMMUNITY

## 2022-05-16 RX ORDER — SACUBITRIL AND VALSARTAN 24; 26 MG/1; MG/1
0.5 TABLET, FILM COATED ORAL 2 TIMES DAILY
Qty: 30 TABLET | Refills: 0 | Status: SHIPPED | OUTPATIENT
Start: 2022-05-16 | End: 2022-06-08 | Stop reason: SDUPTHER

## 2022-05-16 NOTE — PROGRESS NOTES
[facility] in [place], [facility] Note    Patient name: Reed Askew  Patient : 1929  Patient MRN: [id number]  Date of service: 22    Primary care physician: Brenden Herrmann MD  Primary general cardiologist:  Dr. Nikki Bynum  Primary [facility] cardiologist: Reina Flores MD    CHIEF COMPLAINT:  Chronic systolic heart failure    PLAN OF CARE:  · 81 y/o male with severe likely non-ischemic cardiomyopathy, LVEF 15%, stage D, NYHA class IIIB symptoms referred for evaluation for advanced therapies; we discussed that as symptoms worsen we can offer hospital admission for trial of inotropic support as palliation/bridge to hospice  · We discussed importance to review advanced care plan (the one in epic signed 18 years ago), with a focus on limits of care and that it is okay to not want to proceed with further procedures or hospitalizations; d/w patient that we have OP palliative care team available for consult  · Patient is not a candidate for surgical options for advanced heart failure due to age and frailty, and doubt that CRT device lieu of LVAD would be helpful considering how advanced is the stage of heart failure    PLAN:  Continue current medical therapy for heart failure  Intolerant of beta-blockers due to hypotension  Decrease entresto to one half tablet of 26/24mg twice daily due to hypotension  Hold off spironolactone due to worsening renal function  Poor candidate for SGLT2 inhibitor due to hypotension  Continue current dose of bumex 2mg daily  Not on allopurinol or uloric, check uric acid level  Continue current dose of synthroid 88mcg daily  Does not take aspirin  Does not wear lifevest  Obtain genetic testing  Screening HF labs odered  Reinforced low salt diet  Reinforced fluid restriction to 6 x 8oz glasses per day  Provided educational materials \"Living with heart failure\"   Provided advanced care plan forms to be filled out    Referral to sleep medicine  Follow-up with nutritionist   Follow-up with primary cardiologist, Dr. Mariya Tom  Recommend flu, covid and pneumonia vaccinations  Return to [facility] Clinic in one week with MD    IMPRESSION:  Fatigue  Shortness of breath  Chronic systolic heart failure   Stage C, NYHA class IIIB symptoms   Dilated cardiomyopathy, LVEF 15%   Assumed non-ischemic etiology of heart failure  LBBB  PVC  Cardiac risk factors   HTN   HL   High risk WOLF - STOP BANG 4  MGUS  Hypothyroidism    CARDIAC IMAGING:  Echo (5/6/22)    Left Ventricle: Severely reduced left ventricular systolic function with a visually estimated EF of 10 -15%. Left ventricle size is normal. Mildly increased wall thickness. Severe global hypokinesis present.   Right Ventricle: Right ventricle is mildly dilated. Moderately reduced systolic function. TAPSE is 1.5 cm.   Right Atrium: Right atrium is moderately dilated.   Left Atrium: Left atrium is moderately dilated. Left atrial volume index is moderately increased (42-48 mL/m2).   Aortic Valve: Valve structure is normal. Mild sclerosis of the aortic valve cusp.   Mitral Valve: Moderate to severe regurgitation with impingement on pulmonary veins.   Tricuspid Valve: Moderate regurgitation. Severely elevated RVSP. The estimated RVSP is 77 mmHgmmHg.   Pericardium: Left pleural effusion. Ascites present. EKG (2/24/22) NSR, PVCs, QRSd 164ms  LHC not in epic    HEMODYNAMICS:  RHC not done  CPEST not done    6 Min Walk Report 5/16/2022   (PRE) HR 86   (PRE) O2 Sat 98   (POST)    (POST) O2 Sat 93   Distance in Meters 169.87     PERSONAL GOALS:  Patient lifestyle goals reviewed and discussed. HISTORY OF PRESENT ILLNESS:  I had the pleasure of seeing Elaine Ordaz in [facility] at [facility] in [place].     Briefly, Elaine Ordaz is a 80 y.o. male with h/o HTN, HL, high risk for WOLF, chronic systolic heart failure, stage D, NYHA class IIIB/IV symptoms due to dilated cardiomyopathy, LVEF 15%, assumed non-ischemic etiology of heart failure, LBBB, PVCs, MGUS and hypothyroidism. Patient referred to [facility] to discussed advanced therapies for end-stage heart failure. INTERVAL HISTORY:  Today, patient presents for initial clinic visit accompanied by his wife. Patient is doing \"okay\". Feels better after diuresis. Patient walked from waiting room to clinic slowly. Patient can walk from home to mailbox Patient has difficulty climbing stairs. Routine activities such as cooking or cleaning are limited by fatigue and dyspnea. Patient denies symptoms of volume overload or leg edema. Patient denies abdominal bloating or change of appetite. Patient's weight remained stable. Patient has orthopnea, but not PND or nocturia. Patient denies irregular heart rate or palpitations. No presyncope or syncope. Patient is compliant with fluid restriction and taking medications as prescribed. Patient manages his own medications. REVIEW OF SYSTEMS:  General: Denies fever, night sweats. Ear, nose and throat: Denies difficulty hearing, sinus problems, runny nose, post-nasal drip, ringing in ears, mouth sores, loose teeth, ear pain, nosebleeds, sore throate, facial pain or numbess  Cardiovascular: see above in the interval history  Respiratory: Denies cough, wheezing, sputum production, hemoptysis.   Gastrointestinal: Denies heartburn, constipation, diarrhea, abdominal pain, nausea, vomiting, difficulty swallowing, blood in stool  Kidney and bladder: Denies painful urination, frequent urination, urgency  Musculoskeletal: Denies joint pain, muscle weakness  Skin and hair: Denies change in existing skin lesions, hair loss or increase, breast changes    PHYSICAL EXAM:  Visit Vitals  BP (!) 90/52 (BP 1 Location: Left arm, BP Patient Position: Sitting, BP Cuff Size: Adult)   Pulse 96   Temp 97.9 °F (36.6 °C) (Oral)   Resp 16   Ht 5' 11\" (1.803 m)   Wt 166 lb 12.8 oz (75.7 kg)   SpO2 98%   BMI 23.26 kg/m²     General: Patient is frail and cachectic appearing male in no acute distress  HEENT: Normocephalic and atraumatic. No scleral icterus. Pupils are equal, round and reactive to light and accomodation. No conjunctival injection. Oropharynx is clear. Neck: Supple. No evidence of thyroid enlargements or lymphadenopathy. JVD: Cannot be appreciated   Lungs: Breath sounds are equal and clear bilaterally. No wheezes, rhonchi, or rales. Heart: Regular rate and rhythm with normal S1 and S2. No murmurs, gallops or rubs. Abdomen: Soft, no mass or tenderness. No organomegaly or hernia. Bowel sounds present. Genitourinary and rectal: deferred  Extremities: No cyanosis, clubbing, or edema. Neurologic: No focal sensory or motor deficits are noted. Grossly intact. Psychiatric: Awake, alert an doriented x 3. Appropriate mood and affect. Skin: Warm, dry and well perfused. No lesions, nodules or rashes are noted.     PAST MEDICAL HISTORY:  Past Medical History:   Diagnosis Date    Arrhythmia     PAT, PVC'S LAST ABOUT A YEAR AGO    Arthritis     OSTEO    BPH 7/16/2009    Cancer ([facility] [address]     basal cell skin cancer, MELANOMA    Cataract     bilateral    Cataract     bilateral    Cellulitis 10/15/2018    left hand     Depression 2011    Diarrhea 7/16/2009    Hyperlipidemia 7/16/2009    Hypertension     NOT TREATED    Other ill-defined conditions(799.89)     bph    Other ill-defined conditions(799.89)     colon polyps    Other ill-defined conditions(799.89)     high cholesterol-PT DENIES    Other ill-defined conditions(799.89)     shingles    Other ill-defined conditions(799.89)     raynauds    Palpitations 7/16/2009    Personal history of colonic polyps 7/16/2009    Proteinuria     Psychiatric disorder     ANXIETY AND DEPRESSION    Raynaud disease 7/16/2009    Shingles 7/16/2009    Skin lesions 10/2018    lesions removed by DERM from face and ear     Unspecified sleep apnea     NO CPAP       PAST SURGICAL HISTORY:  Past Surgical History:   Procedure Laterality Date    ENDOSCOPY, COLON, DIAGNOSTIC      10, due 15    HX APPENDECTOMY  1987    HX CATARACT REMOVAL  02/22/16    left eye & implant    HX CATARACT REMOVAL  04/25/2016    right eye & implant    HX CHOLECYSTECTOMY  1998    HX COLONOSCOPY      2012    HX HEENT  fall 2016    skin cancer removal - right lower calf -Dr. Venkatesh Moura HX OTHER SURGICAL      basal cell cancer removed face and ear    HX TOTAL COLECTOMY  1987    HEMICOLECTOMY       FAMILY HISTORY:  Family History   Problem Relation Age of Onset    Stroke Father     Diabetes Brother     Kidney Disease Brother     Cancer Brother         lung    Cancer Mother         BREAST WITH METS TO LUNG    Heart Disease Mother     Lung Disease Sister     Cancer Sister         COLON    Cancer Sister         MELANOMA    Heart Attack Sister        SOCIAL HISTORY:  Social History     Socioeconomic History    Marital status:    Tobacco Use    Smoking status: Never Smoker    Smokeless tobacco: Never Used   Vaping Use    Vaping Use: Never used   Substance and Sexual Activity    Alcohol use: Not Currently    Drug use: Yes     Types: OTC    Sexual activity: Not Currently       LABORATORY RESULTS:  Labs Latest Ref Rng & Units 5/10/2022 4/11/2022   Calcium 8.5 - 10.1 MG/DL 8.8 8.5   Glucose 65 - 100 mg/dL 102(H) 108(H)   BUN 6 - 20 MG/DL 29(H) 23(H)   Creatinine 0.70 - 1.30 MG/DL 1.44(H) 1.30   Sodium 136 - 145 mmol/L 141 138   Potassium 3.5 - 5.1 mmol/L 3.8 4.1   Some recent data might be hidden       ALLERGY:  Allergies   Allergen Reactions    Hydrocodone Other (comments)     WEIRD DREAMS        CURRENT MEDICATIONS:    Current Outpatient Medications:     sacubitriL-valsartan (Entresto) 24-26 mg tablet, Take 0.5 Tablets by mouth two (2) times a day., Disp: 30 Tablet, Rfl: 0    fluticasone propionate (Flonase Allergy Relief) 50 mcg/actuation nasal spray, 2 Sprays by Both Nostrils route daily. , Disp: , Rfl:     levothyroxine (SYNTHROID) 88 mcg tablet, TAKE 1 TABLET DAILY - new dose, Disp: 30 Tablet, Rfl: 5    finasteride (Proscar) 5 mg tablet, Take 1 Tablet by mouth daily (with dinner). , Disp: 90 Tablet, Rfl: 3    colestipoL (COLESTID) 1 gram tablet, 2 PO every day, Disp: 180 Tablet, Rfl: 2    escitalopram oxalate (LEXAPRO) 10 mg tablet, TAKE 1 TABLET DAILY FOR ANXIOUSNESS ASSOCIATED WITH DEPRESSION, Disp: 90 Tablet, Rfl: 3    Bifidobacterium Infantis (ALIGN) 4 mg cap, Take 1 Cap by mouth daily. , Disp: , Rfl:     acetaminophen (TYLENOL EXTRA STRENGTH) 500 mg tablet, Take  by mouth every six (6) hours as needed for Pain., Disp: , Rfl:     bumetanide (BUMEX) 2 mg tablet, Take 1 Tablet by mouth daily. (Patient taking differently: Take 1 mg by mouth daily.), Disp: 30 Tablet, Rfl: 1    Thank you for your referral and allowing me to participate in this patient's care.     Vani Klein MD PhD  [address] [place] [address], [address]  Phone: [phone]  Fax: [phone]    PATIENT CARE TEAM:  Patient Care Team:  Raymonde Curling, MD as PCP - General  Raymonde Curling, MD as PCP - [facility] Provider  Esme Fung RN as Ambulatory Care Manager  Tavares Rocha MD (Cardiovascular Disease Physician)  Demarcus Mcallister DDS as Physician ([address] [place] [address])  Korina Hope MD as Physician (Ophthalmology)  Phan Sanches MD as Physician (Dermatology Physician)  Azucena Zhou MD as Physician (Urology)  Jonah Cowan MD as Physician (Hematology and Oncology)  Tavares Rocha MD (Cardiovascular Disease Physician)     Total visit time: 40 minutes (> 50% spent face-to-face counseling)

## 2022-05-16 NOTE — PATIENT INSTRUCTIONS
Medication changes:    Decrease entresto 24/26- take half a tablet twice a day     Please take this to your pharmacy to notify them of the change in medications. Testing Ordered:    Genetic testing completed in clinic     Please present to local lab [facility] this week to have fasting labs drawn. You will be notified of any abnormal results that require a change in medication regimen. Other Recommendations:     Consider admission for palliative inotrope call [phone] if you decide you would like to be admitted for further workup     You have been referred to sleep medicine they will contact you for scheduling     Heart failure booklet provided     Advanced care planning documents provided please fill out and bring to your next appt      Ensure your drinking an adequate amount of water with a goal of 6-8 eight ounce glasses (1.5-2 liters) of fluid daily. Your urine should be clear and light yellow straw colored. If your blood pressure begins to consistently run below 90/60 and/or you begin to experience dizziness or lightheadedness, please contact the [employer] at [phone]. [facility]      Please monitor your weights daily upon waking and after using the bathroom. Keep a written records of your weights and bring to your next appointment. If you have a weight gain of 3 or more pounds overnight OR 5 or more pounds in one week please contact our office. Thank you for allowing us the privilege of being a part of your healthcare team! Please do not hesitate to contact our office at [phone] with any questions or concerns. Virtual Heart Failure [address] invites you to learn more about heart failure and to share your questions, ideas, and experiences with others.  Each month, the Heart Failure Support Group features a new educational topic and a guest speaker, followed by an interactive discussion. Our Heart Failure Nurse Navigator will moderate each session. You will be able to participate by phone, tablet or computer through [employer]. This support group takes place on the 3rd Thursday of each month from 6:00-7:30PM. All individuals living with heart failure and their caregivers are welcome to join. If you are interested in participating, please contact us at [email or web address] and you will be sent the link to join the [employer].

## 2022-05-16 NOTE — LETTER
5/23/2022 9:45 AM    Dear Dr. Ida Guerrier,    Thank you for your consult of patient Roya Longo to the [address] [place] [address] at [facility].  He was seen on 5/16/2022. I have attached a copy of the progress note detailing my exam and plan of care. We appreciate your confidence in our clinic to provide the highest quality of care to your patient. If I can answer any questions regarding our plan of care, please don't hesitate to contact my office at [phone].         Sincerely,            Kesha Aviles MD

## 2022-05-18 ENCOUNTER — TELEPHONE (OUTPATIENT)
Dept: CARDIOLOGY CLINIC | Age: 87
End: 2022-05-18

## 2022-05-18 NOTE — TELEPHONE ENCOUNTER
Lab alert platelets 97    I called wife, reviewed with her per Elizabeth Justice:  Noted, does he take aspirin, nsaids or PPI that he didn't mention   Wife denies any of the above medication. She states patient has in the past seen Dr. Tial Gold for \"blood issues\" but currently does not follow with her. Also per Dr. Amy Toney, will start Vitamin D 50,000 units once per week for low Vitamin D level. Prescription sent and wife states understanding. Wife states at this time patient does not want inotropes. Wife and patient will discuss with Dr. Pete Ramires if they want to continue with [facility] and call back.

## 2022-05-18 NOTE — TELEPHONE ENCOUNTER
Pt wife calling to report that pts bp is low, 30 mins about (3:15 pm 5/18) pt's left arm was 71/51 and right 88/59, please advise.      [phone]

## 2022-05-18 NOTE — TELEPHONE ENCOUNTER
Called Mrs. James Handley. She said pt's BP is always kind of low, but the Heart Failure clinic advised he check BP before and a couple hours after meds because it was low in their office, they also cut his entresto in 1/2. His BP was low this am as usually, but 2 hours after meds 71/51 then 88/59. . She said patient denied sxs. Asked if he is staying hydrated, eating, ect. She said he's trying, but hard to get him to drink plenty of water. She rechecked BP again and it was 109/65, HR 82. Patient scheduled to see Dr. Jonas Askew tomorrow. I said I'd confirm with Dr. Jonas Askew if okay for pt to take entresto 1/2 dose tonight and tomorrow, then call back. Dr. Jonas Askew advised he should try to take it, drink water, still avoid salt. Called Mrs. James Handley and notified her of this. Patient verbalized understanding and denied further questions or concerns.

## 2022-05-19 ENCOUNTER — OFFICE VISIT (OUTPATIENT)
Dept: CARDIOLOGY CLINIC | Age: 87
End: 2022-05-19
Payer: MEDICARE

## 2022-05-19 VITALS
HEIGHT: 71 IN | OXYGEN SATURATION: 98 % | BODY MASS INDEX: 23.13 KG/M2 | HEART RATE: 99 BPM | SYSTOLIC BLOOD PRESSURE: 122 MMHG | DIASTOLIC BLOOD PRESSURE: 58 MMHG | RESPIRATION RATE: 18 BRPM | WEIGHT: 165.2 LBS

## 2022-05-19 DIAGNOSIS — I42.0 DILATED CARDIOMYOPATHY (HCC): Primary | ICD-10-CM

## 2022-05-19 DIAGNOSIS — I49.3 PVC (PREMATURE VENTRICULAR CONTRACTION): ICD-10-CM

## 2022-05-19 DIAGNOSIS — E78.2 MIXED HYPERLIPIDEMIA: ICD-10-CM

## 2022-05-19 DIAGNOSIS — I44.7 LBBB (LEFT BUNDLE BRANCH BLOCK): ICD-10-CM

## 2022-05-19 PROCEDURE — G8427 DOCREV CUR MEDS BY ELIG CLIN: HCPCS | Performed by: SPECIALIST

## 2022-05-19 PROCEDURE — 1101F PT FALLS ASSESS-DOCD LE1/YR: CPT | Performed by: SPECIALIST

## 2022-05-19 PROCEDURE — G8536 NO DOC ELDER MAL SCRN: HCPCS | Performed by: SPECIALIST

## 2022-05-19 PROCEDURE — 99214 OFFICE O/P EST MOD 30 MIN: CPT | Performed by: SPECIALIST

## 2022-05-19 PROCEDURE — G0463 HOSPITAL OUTPT CLINIC VISIT: HCPCS | Performed by: SPECIALIST

## 2022-05-19 PROCEDURE — G8420 CALC BMI NORM PARAMETERS: HCPCS | Performed by: SPECIALIST

## 2022-05-19 PROCEDURE — G9717 DOC PT DX DEP/BP F/U NT REQ: HCPCS | Performed by: SPECIALIST

## 2022-05-19 RX ORDER — ERGOCALCIFEROL 1.25 MG/1
50000 CAPSULE ORAL
Qty: 12 CAPSULE | Refills: 0 | Status: SHIPPED | OUTPATIENT
Start: 2022-05-19 | End: 2022-09-15 | Stop reason: ALTCHOICE

## 2022-05-19 NOTE — PROGRESS NOTES
HISTORY OF PRESENT ILLNESS  Eliud Shah is a 80 y.o. male     SUMMARY:   Problem List  Date Reviewed: 5/19/2022          Codes Class Noted    SOB (shortness of breath) ICD-10-CM: R06.02  ICD-9-CM: 786.05  4/3/2022        MGUS (monoclonal gammopathy of unknown significance) ICD-10-CM: D47.2  ICD-9-CM: 273.1  4/10/2018        Diarrhea following gastrointestinal surgery ICD-10-CM: R19.7, Z98.890  ICD-9-CM: 564.4  11/1/2017        Cataract ICD-10-CM: H26.9  ICD-9-CM: 366.9  Unknown    Overview Signed 4/7/2016  2:24 PM by Esequiel West     bilateral             Advance directive on file ICD-10-CM: Z78.9  ICD-9-CM: V49.89  4/7/2016        Situational depression ICD-10-CM: F43.21  ICD-9-CM: 309.0  7/21/2014        Acquired autoimmune hypothyroidism ICD-10-CM: E06.3  ICD-9-CM: 244.8  4/14/2014        Proteinuria ICD-10-CM: R80.9  ICD-9-CM: 791.0  10/16/2013        Impaired fasting glucose ICD-10-CM: R73.01  ICD-9-CM: 790.21  10/8/2013        LBBB (left bundle branch block) ICD-10-CM: I44.7  ICD-9-CM: 426.3  9/28/2012        BPH (benign prostatic hyperplasia) ICD-10-CM: N40.0  ICD-9-CM: 600.00  9/27/2012        Elevated blood pressure reading without diagnosis of hypertension ICD-10-CM: R03.0  ICD-9-CM: 796.2  9/7/2011        Anxiety state ICD-10-CM: F41.1  ICD-9-CM: 300.00  8/24/2011        PVC (premature ventricular contraction) ICD-10-CM: I49.3  ICD-9-CM: 427.69  8/24/2011        Personal history of colonic polyps ICD-10-CM: Z86.010  ICD-9-CM: V12.72  7/16/2009        Hyperlipidemia ICD-10-CM: E78.5  ICD-9-CM: 272.4  7/16/2009        Diarrhea ICD-10-CM: R19.7  ICD-9-CM: 787.91  7/16/2009        Shingles ICD-10-CM: B02.9  ICD-9-CM: 053.9  7/16/2009        Raynaud disease ICD-10-CM: I73.00  ICD-9-CM: 443.0  7/16/2009        Palpitations ICD-10-CM: R00.2  ICD-9-CM: 785.1  7/16/2009              Current Outpatient Medications on File Prior to Visit   Medication Sig    ergocalciferol (ERGOCALCIFEROL) 1,250 mcg (50,000 unit) capsule Take 1 Capsule by mouth every seven (7) days.  sacubitriL-valsartan (Entresto) 24-26 mg tablet Take 0.5 Tablets by mouth two (2) times a day.  fluticasone propionate (Flonase Allergy Relief) 50 mcg/actuation nasal spray 2 Sprays by Both Nostrils route daily.  bumetanide (BUMEX) 2 mg tablet Take 1 Tablet by mouth daily. (Patient taking differently: Take 1 mg by mouth daily.)    levothyroxine (SYNTHROID) 88 mcg tablet TAKE 1 TABLET DAILY - new dose    finasteride (Proscar) 5 mg tablet Take 1 Tablet by mouth daily (with dinner).  colestipoL (COLESTID) 1 gram tablet 2 PO every day    escitalopram oxalate (LEXAPRO) 10 mg tablet TAKE 1 TABLET DAILY FOR ANXIOUSNESS ASSOCIATED WITH DEPRESSION    Bifidobacterium Infantis (ALIGN) 4 mg cap Take 1 Cap by mouth daily.  acetaminophen (TYLENOL EXTRA STRENGTH) 500 mg tablet Take  by mouth every six (6) hours as needed for Pain. No current facility-administered medications on file prior to visit. CARDIOLOGY STUDIES TO DATE:  7/11 lvh and mild mr, otherwise normal echo    8/12. Lexiscan reversible inf defect vs attenuation    5/22 echo normal lv size with lvef 10-15%, mild rve with mod decreased rvef, mark, lae, mod to severe mr, mod tr with pa pressure 77mm    Chief Complaint   Patient presents with    Follow-up     2 week    Leg Swelling     HPI :  He is doing much better since we last met. He is actually lost about 17 pounds by our scale and his edema is all gone and he is able to move around now and walk some with really no significant shortness of breath. After we gave him 3 or 4 days of 2 mg of Bumex recheck some labs and his creatinine was up just a bit so since the beginning of the week he is down to taking 1 mg a day and his weight is holding steady. He very much wants to spend the summer and early fall up at their home in [facility] so we talked about that at length.   He did meet with the advanced heart failure team and they cut back his Entresto because his blood pressure was so low. CARDIAC ROS:   negative for chest pain, palpitations, syncope, orthopnea, paroxysmal nocturnal dyspnea, exertional chest pressure/discomfort, claudication, lower extremity edema    Family History   Problem Relation Age of Onset    Stroke Father     Diabetes Brother     Kidney Disease Brother     Cancer Brother         lung    Cancer Mother         BREAST WITH METS TO LUNG    Heart Disease Mother     Lung Disease Sister     Cancer Sister         COLON    Cancer Sister         MELANOMA    Heart Attack Sister        Past Medical History:   Diagnosis Date    Arrhythmia     PAT, PVC'S LAST ABOUT A YEAR AGO    Arthritis     OSTEO    BPH 7/16/2009    Cancer ([address]     basal cell skin cancer, MELANOMA    Cataract     bilateral    Cataract     bilateral    Cellulitis 10/15/2018    left hand     Depression 2011    Diarrhea 7/16/2009    Hyperlipidemia 7/16/2009    Hypertension     NOT TREATED    Other ill-defined conditions(799.89)     bph    Other ill-defined conditions(799.89)     colon polyps    Other ill-defined conditions(799.89)     high cholesterol-PT DENIES    Other ill-defined conditions(799.89)     shingles    Other ill-defined conditions(799.89)     raynauds    Palpitations 7/16/2009    Personal history of colonic polyps 7/16/2009    Proteinuria     Psychiatric disorder     ANXIETY AND DEPRESSION    Raynaud disease 7/16/2009    Shingles 7/16/2009    Skin lesions 10/2018    lesions removed by DERM from face and ear     Unspecified sleep apnea     NO CPAP       GENERAL ROS:  A comprehensive review of systems was negative except for that written in the HPI.     Visit Vitals  BP (!) 122/58 (BP 1 Location: Left arm, BP Patient Position: Sitting, BP Cuff Size: Adult)   Pulse 99   Resp 18   Ht 5' 11\" (1.803 m)   Wt 165 lb 3.2 oz (74.9 kg)   SpO2 98%   BMI 23.04 kg/m²       Wt Readings from Last 3 Encounters:   05/19/22 165 lb 3.2 oz (74.9 kg)   05/16/22 166 lb 12.8 oz (75.7 kg)   05/06/22 182 lb (82.6 kg)            BP Readings from Last 3 Encounters:   05/19/22 (!) 122/58   05/16/22 (!) 90/52   05/06/22 132/70       PHYSICAL EXAM  General appearance: alert, cooperative, no distress, appears stated age  Neurologic: Alert and oriented X 3  Neck: supple, symmetrical, trachea midline, no adenopathy, no carotid bruit and no JVD  Lungs: clear to auscultation bilaterally  Heart: regular rate and rhythm, S1, S2 normal, no murmur, click, rub or gallop  Extremities: extremities normal, atraumatic, no cyanosis or edema    Lab Results   Component Value Date/Time    Cholesterol, total 131 11/29/2021 10:59 AM    Cholesterol, total 127 11/13/2020 09:36 AM    Cholesterol, total 138 11/13/2019 09:58 AM    Cholesterol, total 114 10/30/2018 09:11 AM    Cholesterol, total 134 06/13/2017 01:42 PM    HDL Cholesterol 59 11/29/2021 10:59 AM    HDL Cholesterol 50 11/13/2020 09:36 AM    HDL Cholesterol 60 11/13/2019 09:58 AM    HDL Cholesterol 55 10/30/2018 09:11 AM    HDL Cholesterol 45 06/13/2017 01:42 PM    LDL, calculated 52.4 11/29/2021 10:59 AM    LDL, calculated 58.6 11/13/2020 09:36 AM    LDL, calculated 64 11/13/2019 09:58 AM    LDL, calculated 42 10/30/2018 09:11 AM    LDL, calculated 60 06/13/2017 01:42 PM    Triglyceride 98 11/29/2021 10:59 AM    Triglyceride 92 11/13/2020 09:36 AM    Triglyceride 72 11/13/2019 09:58 AM    Triglyceride 84 10/30/2018 09:11 AM    Triglyceride 146 06/13/2017 01:42 PM    CHOL/HDL Ratio 2.2 11/29/2021 10:59 AM    CHOL/HDL Ratio 2.5 11/13/2020 09:36 AM    CHOL/HDL Ratio 2.8 09/23/2010 10:03 AM    CHOL/HDL Ratio 3.2 09/22/2009 10:15 AM     ASSESSMENT :      Diuresis alone seems to have really made a major impact on his symptoms which is great and I am not can make any changes at this point. We will get a repeat BMP in a couple of days.   We will try to help him find a place where he can get blood work done near their place in [place] and we will set him up for virtual visit and then an office visit in about 3 months to repeat his echo. He asked about recommendations for heart failure experts and either [place] or [facility] and I suggested that they reach out to the advanced heart failure team since they would know those folks better than I.  current treatment plan is effective, no change in therapy  lab results and schedule of future lab studies reviewed with patient  reviewed diet, exercise and weight control    Encounter Diagnoses   Name Primary?  Dilated cardiomyopathy (HCC) Yes    LBBB (left bundle branch block)     PVC (premature ventricular contraction)     Mixed hyperlipidemia      Orders Placed This Encounter    METABOLIC PANEL, BASIC       Follow-up and Dispositions    · Return in about 6 weeks (around 6/30/2022). Karthik Orellana MD  5/19/2022  Please note that this dictation was completed with [employer], the computer voice recognition software. Quite often unanticipated grammatical, syntax, homophones, and other interpretive errors are inadvertently transcribed by the computer software. Please disregard these errors. Please excuse any errors that have escaped final proofreading. Thank you.

## 2022-05-19 NOTE — PATIENT INSTRUCTIONS
Have labs obtained on 5-23-22. Our office will call you with results. Schedule virtual visit in 6 weeks.

## 2022-05-23 DIAGNOSIS — I42.0 DILATED CARDIOMYOPATHY (HCC): ICD-10-CM

## 2022-05-23 DIAGNOSIS — E78.2 MIXED HYPERLIPIDEMIA: ICD-10-CM

## 2022-05-23 LAB
25(OH)D3+25(OH)D2 SERPL-MCNC: 28.6 NG/ML (ref 30–100)
ALBUMIN SERPL ELPH-MCNC: 3.5 G/DL (ref 2.9–4.4)
ALBUMIN SERPL-MCNC: 4.1 G/DL (ref 3.5–4.6)
ALBUMIN/GLOB SERPL: 1.2 {RATIO} (ref 0.7–1.7)
ALBUMIN/GLOB SERPL: 1.7 {RATIO} (ref 1.2–2.2)
ALP SERPL-CCNC: 54 IU/L (ref 44–121)
ALPHA1 GLOB SERPL ELPH-MCNC: 0.2 G/DL (ref 0–0.4)
ALPHA2 GLOB SERPL ELPH-MCNC: 0.6 G/DL (ref 0.4–1)
ALT SERPL-CCNC: 8 IU/L (ref 0–44)
AST SERPL-CCNC: 14 IU/L (ref 0–40)
B-GLOBULIN SERPL ELPH-MCNC: 0.7 G/DL (ref 0.7–1.3)
BILIRUB SERPL-MCNC: 0.6 MG/DL (ref 0–1.2)
BUN SERPL-MCNC: 30 MG/DL (ref 10–36)
BUN/CREAT SERPL: 25 (ref 10–24)
CALCIUM SERPL-MCNC: 8.8 MG/DL (ref 8.6–10.2)
CENTROMERE B AB SER-ACNC: <0.2 AI (ref 0–0.9)
CHLORIDE SERPL-SCNC: 103 MMOL/L (ref 96–106)
CHOLEST SERPL-MCNC: 99 MG/DL (ref 100–199)
CHROMATIN AB SERPL-ACNC: <0.2 AI (ref 0–0.9)
CK SERPL-CCNC: 40 U/L (ref 30–208)
CO2 SERPL-SCNC: 26 MMOL/L (ref 20–29)
CREAT SERPL-MCNC: 1.18 MG/DL (ref 0.76–1.27)
CRP SERPL-MCNC: 4 MG/L (ref 0–10)
DSDNA AB SER-ACNC: 2 IU/ML (ref 0–9)
EGFR: 58 ML/MIN/1.73
ENA JO1 AB SER-ACNC: <0.2 AI (ref 0–0.9)
ENA RNP AB SER-ACNC: <0.2 AI (ref 0–0.9)
ENA SCL70 AB SER-ACNC: <0.2 AI (ref 0–0.9)
ENA SM AB SER-ACNC: <0.2 AI (ref 0–0.9)
ENA SS-A AB SER-ACNC: <0.2 AI (ref 0–0.9)
ENA SS-B AB SER-ACNC: <0.2 AI (ref 0–0.9)
ERYTHROCYTE [DISTWIDTH] IN BLOOD BY AUTOMATED COUNT: 12.9 % (ref 11.6–15.4)
FERRITIN SERPL-MCNC: 57 NG/ML (ref 30–400)
FOLATE SERPL-MCNC: 16 NG/ML
GAMMA GLOB SERPL ELPH-MCNC: 1.5 G/DL (ref 0.4–1.8)
GLOBULIN SER CALC-MCNC: 2.4 G/DL (ref 1.5–4.5)
GLOBULIN SER-MCNC: 3 G/DL (ref 2.2–3.9)
GLUCOSE SERPL-MCNC: 96 MG/DL (ref 65–99)
HAV IGM SERPL QL IA: NEGATIVE
HBV CORE IGM SERPL QL IA: NEGATIVE
HBV SURFACE AG SERPL QL IA: NEGATIVE
HCT VFR BLD AUTO: 35.3 % (ref 37.5–51)
HCV AB S/CO SERPL IA: 0.1 S/CO RATIO (ref 0–0.9)
HCV AB SERPL QL IA: NORMAL
HDLC SERPL-MCNC: 48 MG/DL
HGB BLD-MCNC: 11.6 G/DL (ref 13–17.7)
IGA SERPL-MCNC: 117 MG/DL (ref 61–437)
IGG SERPL-MCNC: 1610 MG/DL (ref 603–1613)
IGM SERPL-MCNC: 105 MG/DL (ref 15–143)
INTERPRETATION SERPL IEP-IMP: ABNORMAL
IRON SATN MFR SERPL: 28 % (ref 15–55)
IRON SERPL-MCNC: 96 UG/DL (ref 38–169)
KAPPA LC FREE SER-MCNC: 33.3 MG/L (ref 3.3–19.4)
KAPPA LC FREE/LAMBDA FREE SER: 1.52 {RATIO} (ref 0.26–1.65)
LAMBDA LC FREE SERPL-MCNC: 21.9 MG/L (ref 5.7–26.3)
LDLC SERPL CALC-MCNC: 37 MG/DL (ref 0–99)
M PROTEIN SERPL ELPH-MCNC: 0.9 G/DL
MAGNESIUM SERPL-MCNC: 2.2 MG/DL (ref 1.6–2.3)
MCH RBC QN AUTO: 29.7 PG (ref 26.6–33)
MCHC RBC AUTO-ENTMCNC: 32.9 G/DL (ref 31.5–35.7)
MCV RBC AUTO: 90 FL (ref 79–97)
MORPHOLOGY BLD-IMP: ABNORMAL
NT-PROBNP SERPL-MCNC: 6285 PG/ML (ref 0–486)
PLATELET # BLD AUTO: 97 X10E3/UL (ref 150–450)
PLEASE NOTE:, 149534: ABNORMAL
POTASSIUM SERPL-SCNC: 4.6 MMOL/L (ref 3.5–5.2)
PROT SERPL-MCNC: 6.5 G/DL (ref 6–8.5)
RBC # BLD AUTO: 3.91 X10E6/UL (ref 4.14–5.8)
SEE BELOW, 164869: NORMAL
SODIUM SERPL-SCNC: 139 MMOL/L (ref 134–144)
T3FREE SERPL-MCNC: 2.2 PG/ML (ref 2–4.4)
T4 FREE SERPL-MCNC: 1.26 NG/DL (ref 0.82–1.77)
TIBC SERPL-MCNC: 344 UG/DL (ref 250–450)
TRIGL SERPL-MCNC: 62 MG/DL (ref 0–149)
TROPONIN T SERPL HS-MCNC: 35 NG/L (ref 0–22)
TSH SERPL DL<=0.005 MIU/L-ACNC: 4.58 UIU/ML (ref 0.45–4.5)
UIBC SERPL-MCNC: 248 UG/DL (ref 111–343)
URATE SERPL-MCNC: 5.7 MG/DL (ref 3.8–8.4)
VIT B12 SERPL-MCNC: 389 PG/ML (ref 232–1245)
VLDLC SERPL CALC-MCNC: 14 MG/DL (ref 5–40)
WBC # BLD AUTO: 5.3 X10E3/UL (ref 3.4–10.8)

## 2022-05-24 ENCOUNTER — OFFICE VISIT (OUTPATIENT)
Dept: INTERNAL MEDICINE CLINIC | Age: 87
End: 2022-05-24
Payer: MEDICARE

## 2022-05-24 ENCOUNTER — TELEPHONE (OUTPATIENT)
Dept: CARDIOLOGY CLINIC | Age: 87
End: 2022-05-24

## 2022-05-24 VITALS
SYSTOLIC BLOOD PRESSURE: 131 MMHG | DIASTOLIC BLOOD PRESSURE: 77 MMHG | RESPIRATION RATE: 16 BRPM | WEIGHT: 167.2 LBS | BODY MASS INDEX: 23.41 KG/M2 | TEMPERATURE: 98.4 F | HEIGHT: 71 IN | HEART RATE: 67 BPM | OXYGEN SATURATION: 98 %

## 2022-05-24 DIAGNOSIS — I49.9 CARDIAC ARRHYTHMIA, UNSPECIFIED CARDIAC ARRHYTHMIA TYPE: ICD-10-CM

## 2022-05-24 DIAGNOSIS — R73.01 IMPAIRED FASTING GLUCOSE: ICD-10-CM

## 2022-05-24 DIAGNOSIS — E06.3 ACQUIRED AUTOIMMUNE HYPOTHYROIDISM: ICD-10-CM

## 2022-05-24 DIAGNOSIS — E78.2 MIXED HYPERLIPIDEMIA: Primary | ICD-10-CM

## 2022-05-24 LAB
ANION GAP SERPL CALC-SCNC: 2 MMOL/L (ref 5–15)
BUN SERPL-MCNC: 30 MG/DL (ref 6–20)
BUN/CREAT SERPL: 25 (ref 12–20)
CALCIUM SERPL-MCNC: 8.8 MG/DL (ref 8.5–10.1)
CHLORIDE SERPL-SCNC: 108 MMOL/L (ref 97–108)
CO2 SERPL-SCNC: 30 MMOL/L (ref 21–32)
CREAT SERPL-MCNC: 1.22 MG/DL (ref 0.7–1.3)
GLUCOSE SERPL-MCNC: 102 MG/DL (ref 65–100)
POTASSIUM SERPL-SCNC: 4.6 MMOL/L (ref 3.5–5.1)
SODIUM SERPL-SCNC: 140 MMOL/L (ref 136–145)

## 2022-05-24 PROCEDURE — 1101F PT FALLS ASSESS-DOCD LE1/YR: CPT | Performed by: INTERNAL MEDICINE

## 2022-05-24 PROCEDURE — G8427 DOCREV CUR MEDS BY ELIG CLIN: HCPCS | Performed by: INTERNAL MEDICINE

## 2022-05-24 PROCEDURE — G8420 CALC BMI NORM PARAMETERS: HCPCS | Performed by: INTERNAL MEDICINE

## 2022-05-24 PROCEDURE — G8536 NO DOC ELDER MAL SCRN: HCPCS | Performed by: INTERNAL MEDICINE

## 2022-05-24 PROCEDURE — 99214 OFFICE O/P EST MOD 30 MIN: CPT | Performed by: INTERNAL MEDICINE

## 2022-05-24 PROCEDURE — G9717 DOC PT DX DEP/BP F/U NT REQ: HCPCS | Performed by: INTERNAL MEDICINE

## 2022-05-24 PROCEDURE — G0463 HOSPITAL OUTPT CLINIC VISIT: HCPCS | Performed by: INTERNAL MEDICINE

## 2022-05-24 NOTE — TELEPHONE ENCOUNTER
----- Message from Clarissa Hawkins MD sent at 5/24/2022  7:54 AM EDT -----  Kidney function has returned to normal, would continue bumex 1mg.  Can take an extra 1mg for couple days if wt goes up 2-3 lbs and doesn't come back down

## 2022-05-24 NOTE — TELEPHONE ENCOUNTER
Called and spoke with Mrs. Veronica Heart. Notified her of pt's lab results and Dr. Sarah Beth Ramsey message. She stated Dr. Shreyas Villa said okay for patient to go to [facility] for 6 weeks and patient planning on leaving tomorrow. She asked if he will need labs drawn again within the next 6 weeks. I said I'd message Dr. Shreyas Villa and call her back.

## 2022-05-24 NOTE — PROGRESS NOTES
HISTORY OF PRESENT ILLNESS  Kenny Luu is a 80 y.o. male. HPI  Assessment:  Ana Paula Marie is seen today accompanied by his wife, Davi Mckeon, for follow up of CHF and other problems. 1. New diagnosis, CHF. He was referred for cardiac evaluation due to shortness of breath. He has been started on oxygen. He has also been referred to the heart failure clinic. His ejection fraction is about 15%. 2. Hypothyroidism, IFG, up to date with labs. Review of Systems   Constitutional: Negative for chills and fever. HENT: Positive for hearing loss. Respiratory: Positive for shortness of breath. Cardiovascular: Negative for chest pain. Physical Exam  Vitals and nursing note reviewed. Constitutional:       General: He is not in acute distress. HENT:      Right Ear: Tympanic membrane and ear canal normal.      Left Ear: Tympanic membrane and ear canal normal.   Cardiovascular:      Rate and Rhythm: Normal rate and regular rhythm. Heart sounds: No murmur heard. No friction rub. No gallop. Pulmonary:      Effort: Pulmonary effort is normal.      Breath sounds: Normal breath sounds. Musculoskeletal:      Right lower leg: No edema. Left lower leg: No edema. ASSESSMENT and PLAN  Diagnoses and all orders for this visit:    1. Mixed hyperlipidemia    2. Impaired fasting glucose    3. Acquired autoimmune hypothyroidism    4.  Cardiac arrhythmia, unspecified cardiac arrhythmia type

## 2022-05-24 NOTE — TELEPHONE ENCOUNTER
----- Message from Yessy Fregoso MD sent at 5/24/2022  2:50 PM EDT -----  No need for further labs unless consistently needs increased bumex dose or something else changes

## 2022-05-24 NOTE — TELEPHONE ENCOUNTER
Called Chong Shayy Kay and notified her of Dr. Narciso Dwyer message. She denied further questions or concerns.

## 2022-05-24 NOTE — PROGRESS NOTES
Kidney function has returned to normal, would continue bumex 1mg.  Can take an extra 1mg for couple days if wt goes up 2-3 lbs and doesn't come back down

## 2022-05-26 ENCOUNTER — TELEPHONE (OUTPATIENT)
Dept: CARDIOLOGY CLINIC | Age: 87
End: 2022-05-26

## 2022-05-26 NOTE — TELEPHONE ENCOUNTER
Patient declined follow up appointment. Patient would rather just see Juan Beck for a visit.  Patient just doesn't want to come to hospital

## 2022-06-01 DIAGNOSIS — I42.0 DILATED CARDIOMYOPATHY (HCC): ICD-10-CM

## 2022-06-01 DIAGNOSIS — I44.7 LBBB (LEFT BUNDLE BRANCH BLOCK): ICD-10-CM

## 2022-06-01 DIAGNOSIS — I49.3 PVC (PREMATURE VENTRICULAR CONTRACTION): ICD-10-CM

## 2022-06-01 DIAGNOSIS — R06.02 SOB (SHORTNESS OF BREATH): ICD-10-CM

## 2022-06-01 RX ORDER — BUMETANIDE 2 MG/1
1 TABLET ORAL DAILY
Qty: 90 TABLET | Refills: 0 | Status: SHIPPED | OUTPATIENT
Start: 2022-06-01

## 2022-06-01 NOTE — TELEPHONE ENCOUNTER
Requested Prescriptions     Signed Prescriptions Disp Refills    bumetanide (BUMEX) 2 mg tablet 90 Tablet 0     Sig: Take 0.5 Tablets by mouth daily.  Or as advised by provider     Authorizing Provider: Shila Gallardo     Ordering User: Carleen Tobar    Per Dr. Hong Roles verbal orders

## 2022-06-02 ENCOUNTER — TELEPHONE (OUTPATIENT)
Dept: CARDIOLOGY CLINIC | Age: 87
End: 2022-06-02

## 2022-06-02 DIAGNOSIS — I44.7 LBBB (LEFT BUNDLE BRANCH BLOCK): ICD-10-CM

## 2022-06-02 DIAGNOSIS — I42.0 DILATED CARDIOMYOPATHY (HCC): ICD-10-CM

## 2022-06-02 DIAGNOSIS — R06.02 SOB (SHORTNESS OF BREATH): ICD-10-CM

## 2022-06-02 DIAGNOSIS — I49.3 PVC (PREMATURE VENTRICULAR CONTRACTION): ICD-10-CM

## 2022-06-02 NOTE — TELEPHONE ENCOUNTER
Called Mrs. Rina Gonzalez. She stated same message below. She said patient feels fine today and BP okay, but she had him hold entresto dose last night. He is only taking 1/2 tab of lowest dose and confirmed he is only taking bumex 1 mg (1/2 of 2 mg tab). He was working in the yard yesterday before sxs and low BP. Advised pateint try to work out in the yard when hot and humid out, stay hydrated, but follow fluid restrictions. Mrs. Rina Gonzalez stated patient often works outside pulling weeds, etc until he gets tired then comes in. Advised he also only work outside in small increments of time, such as 15 minutes, when not hottest part of the day, then take at least 15 minutes to rest indoors, sip water, etc before returning to work. She will notify patient. Also advised best not to hold entresto dose and to call us if low BP continues. Suggested if BP low at dinner time when he usually takes entresto, hold for that time, eat dinner, relax, sip water, check BP again before bedtime to reassess then take the dose held at that time if  or >. She verbalized understanding and denied further questions or concerns.

## 2022-06-02 NOTE — TELEPHONE ENCOUNTER
Patients wife calling to speak with the nurse, reporting low BP from last night. Patient took his entresto at 930 am, was having blurry vision & is weak.    Last night   BP @ 4 pm 79/53 pulse 107   @ 5pm 81/54  @ 1045 94/56  This morning 110/70  Few minutes ago 101/64   No blurry vision today but still weak     [phone]

## 2022-06-08 DIAGNOSIS — I44.7 LBBB (LEFT BUNDLE BRANCH BLOCK): ICD-10-CM

## 2022-06-08 DIAGNOSIS — R06.02 SOB (SHORTNESS OF BREATH): ICD-10-CM

## 2022-06-08 DIAGNOSIS — I42.0 DILATED CARDIOMYOPATHY (HCC): ICD-10-CM

## 2022-06-08 DIAGNOSIS — I49.3 PVC (PREMATURE VENTRICULAR CONTRACTION): ICD-10-CM

## 2022-06-08 RX ORDER — SACUBITRIL AND VALSARTAN 24; 26 MG/1; MG/1
0.5 TABLET, FILM COATED ORAL 2 TIMES DAILY
Qty: 90 TABLET | Refills: 0 | Status: SHIPPED | OUTPATIENT
Start: 2022-06-08 | End: 2022-08-26

## 2022-06-08 NOTE — TELEPHONE ENCOUNTER
Requested Prescriptions     Signed Prescriptions Disp Refills    sacubitriL-valsartan (Entresto) 24-26 mg tablet 90 Tablet 0     Sig: Take 0.5 Tablets by mouth two (2) times a day.      Authorizing Provider: Babita Jaeger     Ordering User: Shea Oliveros    Per Dr. Daine Cushing verbal orders

## 2022-06-13 ENCOUNTER — TELEPHONE (OUTPATIENT)
Dept: CARDIOLOGY CLINIC | Age: 87
End: 2022-06-13

## 2022-06-13 NOTE — LETTER
6/13/2022 11:57 AM    Mr. Eliud Shah  [address]     Dear Mr. Olivia Robertson is a copy of your genetic testing results that were completed during your visit to the [address] [place] [address]. 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 I would like to discuss this further with you if you would like to schedule an appointment. Until then, my recommendation is for you to take advantage of the complimentary genetic counseling through [employer], the company who also did the testing. Please visit [email or web address] to schedule your telephone genetic counseling appointment. Your RQ number from your test is [id number]. You will be asked for this number when you schedule the appointment. This is available at no charge, so please do this soon. In addition, we recommend you share this result with family members, as they may also be at risk. Please find details on our Family Variant Testing Program at [email or web address], as up to 2  family members may be eligible to be tested at no charge. Finally, we recommend a follow up appointment with a cardiac genetic specialist at [facility], Dr Maninder Edwards. We will be happy to discuss this further with you at your next appointment and to place this referral for you. Please don't hesitate to contact our office at [phone] if you have any questions prior to your appointment.         Sincerely,            Vani Klein MD

## 2022-07-01 ENCOUNTER — TELEPHONE (OUTPATIENT)
Dept: CARDIOLOGY CLINIC | Age: 87
End: 2022-07-01

## 2022-07-01 ENCOUNTER — VIRTUAL VISIT (OUTPATIENT)
Dept: CARDIOLOGY CLINIC | Age: 87
End: 2022-07-01
Payer: MEDICARE

## 2022-07-01 DIAGNOSIS — I44.7 LBBB (LEFT BUNDLE BRANCH BLOCK): ICD-10-CM

## 2022-07-01 DIAGNOSIS — I42.0 DILATED CARDIOMYOPATHY (HCC): Primary | ICD-10-CM

## 2022-07-01 DIAGNOSIS — E78.2 MIXED HYPERLIPIDEMIA: ICD-10-CM

## 2022-07-01 DIAGNOSIS — I49.3 PVC (PREMATURE VENTRICULAR CONTRACTION): ICD-10-CM

## 2022-07-01 DIAGNOSIS — R06.02 SOB (SHORTNESS OF BREATH): ICD-10-CM

## 2022-07-01 PROCEDURE — G8536 NO DOC ELDER MAL SCRN: HCPCS | Performed by: SPECIALIST

## 2022-07-01 PROCEDURE — 1123F ACP DISCUSS/DSCN MKR DOCD: CPT | Performed by: SPECIALIST

## 2022-07-01 PROCEDURE — G8420 CALC BMI NORM PARAMETERS: HCPCS | Performed by: SPECIALIST

## 2022-07-01 PROCEDURE — G9717 DOC PT DX DEP/BP F/U NT REQ: HCPCS | Performed by: SPECIALIST

## 2022-07-01 PROCEDURE — G0463 HOSPITAL OUTPT CLINIC VISIT: HCPCS | Performed by: SPECIALIST

## 2022-07-01 PROCEDURE — 1101F PT FALLS ASSESS-DOCD LE1/YR: CPT | Performed by: SPECIALIST

## 2022-07-01 PROCEDURE — G8427 DOCREV CUR MEDS BY ELIG CLIN: HCPCS | Performed by: SPECIALIST

## 2022-07-01 PROCEDURE — 99213 OFFICE O/P EST LOW 20 MIN: CPT | Performed by: SPECIALIST

## 2022-07-01 NOTE — TELEPHONE ENCOUNTER
Called and spoke with Mrs. Lauren Navarrete. Scheduled echo and f/u same day as Mrs. Crain's f/u in September. She denied further questions or concerns.      Future Appointments   Date Time Provider [place]   8/10/2022  2:30 PM Schutrumpf, Hershal Frankel, MD [facility] BS AMB   9/6/2022  1:00 PM BRANDY ORTIZ BS AMB   9/6/2022  2:00 PM MD TERENCE Botello BS AMB   11/7/2022  3:00 PM Schutrumpf, Hershal Frankel, MD [facility] BS AMB

## 2022-07-01 NOTE — TELEPHONE ENCOUNTER
----- Message from Parag Mast MD sent at 7/1/2022 12:26 PM EDT -----  We got disconnected and I couldn't reconnect, but we got through just about everything. Can you check back and see if I missed anything? We should see them maybe mid September with an echo.  thanks

## 2022-07-01 NOTE — PROGRESS NOTES
[facility] Cardiology Telemedicine Encounter                                                         Pursuant to the emergency declaration under the [facility], [facility] waiver authority and the [employer] Act, this Virtual  Visit was conducted, with patient's consent, to reduce the patient's risk of exposure to COVID-19 and provide continuity of care for an established patient. Services were provided through a video synchronous discussion virtually to substitute for in-person clinic visit. 7/11 lvh and mild mr, otherwise normal echo    8/12. Lexiscan reversible inf defect vs attenuation    5/22 echo normal lv size with lvef 10-15%, mild rve with mod decreased rvef, mark, lae, mod to severe mr, mod tr with pa pressure 77mm     Current Outpatient Medications on File Prior to Visit   Medication Sig    sacubitriL-valsartan (Entresto) 24-26 mg tablet Take 0.5 Tablets by mouth two (2) times a day.  bumetanide (BUMEX) 2 mg tablet Take 0.5 Tablets by mouth daily. Or as advised by provider    ergocalciferol (ERGOCALCIFEROL) 1,250 mcg (50,000 unit) capsule Take 1 Capsule by mouth every seven (7) days.  fluticasone propionate (Flonase Allergy Relief) 50 mcg/actuation nasal spray 2 Sprays by Both Nostrils route daily.  levothyroxine (SYNTHROID) 88 mcg tablet TAKE 1 TABLET DAILY - new dose    finasteride (Proscar) 5 mg tablet Take 1 Tablet by mouth daily (with dinner).  colestipoL (COLESTID) 1 gram tablet 2 PO every day (Patient taking differently: 1 PO every day)    escitalopram oxalate (LEXAPRO) 10 mg tablet TAKE 1 TABLET DAILY FOR ANXIOUSNESS ASSOCIATED WITH DEPRESSION    Bifidobacterium Infantis (ALIGN) 4 mg cap Take 1 Cap by mouth daily.  acetaminophen (TYLENOL EXTRA STRENGTH) 500 mg tablet Take  by mouth every six (6) hours as needed for Pain.      No current facility-administered medications on file prior to visit. Subjective/HPI:   Morley Dandy is a 80 y.o. male who was seen by synchronous (real-time) audio-video technology on 7/1/2022. He is doing remarkably well. No edema to speak of and weight is stable. He has been outside doing some work from time to time and occasionally he will have some symptomatic low blood pressure. He is probably not drinking enough water by what he and his wife say. Overall he is quite satisfied with the way things are going.     PCP Provider  Melissa Purvis MD  Past Medical History:   Diagnosis Date    Arrhythmia     PAT, PVC'S LAST ABOUT A YEAR AGO    Arthritis     OSTEO    BPH 7/16/2009    Cancer ([address]     basal cell skin cancer, MELANOMA    Cataract     bilateral    Cataract     bilateral    Cellulitis 10/15/2018    left hand     Depression 2011    Diarrhea 7/16/2009    Hyperlipidemia 7/16/2009    Hypertension     NOT TREATED    Other ill-defined conditions(799.89)     bph    Other ill-defined conditions(799.89)     colon polyps    Other ill-defined conditions(799.89)     high cholesterol-PT DENIES    Other ill-defined conditions(799.89)     shingles    Other ill-defined conditions(799.89)     raynauds    Palpitations 7/16/2009    Personal history of colonic polyps 7/16/2009    Proteinuria     Psychiatric disorder     ANXIETY AND DEPRESSION    Raynaud disease 7/16/2009    Shingles 7/16/2009    Skin lesions 10/2018    lesions removed by DERM from face and ear     Unspecified sleep apnea     NO CPAP      Past Surgical History:   Procedure Laterality Date    ENDOSCOPY, COLON, DIAGNOSTIC      10, due 15    HX APPENDECTOMY  1987    HX CATARACT REMOVAL  02/22/16    left eye & implant    HX CATARACT REMOVAL  04/25/2016    right eye & implant    HX CHOLECYSTECTOMY  1998    HX COLONOSCOPY      2012    HX HEENT  fall 2016    skin cancer removal - right lower calf -Dr. Lance Garibay HX OTHER SURGICAL      basal cell cancer removed face and ear    HX TOTAL COLECTOMY  1987    HEMICOLECTOMY     Allergies   Allergen Reactions    Hydrocodone Other (comments)     WEIRD DREAMS      Family History   Problem Relation Age of Onset    Stroke Father     Diabetes Brother     Kidney Disease Brother     Cancer Brother         lung    Cancer Mother         BREAST WITH METS TO LUNG    Heart Disease Mother     Lung Disease Sister     Cancer Sister         COLON    Cancer Sister         MELANOMA    Heart Attack Sister       Current Outpatient Medications   Medication Sig    sacubitriL-valsartan (Entresto) 24-26 mg tablet Take 0.5 Tablets by mouth two (2) times a day.  bumetanide (BUMEX) 2 mg tablet Take 0.5 Tablets by mouth daily. Or as advised by provider    ergocalciferol (ERGOCALCIFEROL) 1,250 mcg (50,000 unit) capsule Take 1 Capsule by mouth every seven (7) days.  fluticasone propionate (Flonase Allergy Relief) 50 mcg/actuation nasal spray 2 Sprays by Both Nostrils route daily.  levothyroxine (SYNTHROID) 88 mcg tablet TAKE 1 TABLET DAILY - new dose    finasteride (Proscar) 5 mg tablet Take 1 Tablet by mouth daily (with dinner).  colestipoL (COLESTID) 1 gram tablet 2 PO every day (Patient taking differently: 1 PO every day)    escitalopram oxalate (LEXAPRO) 10 mg tablet TAKE 1 TABLET DAILY FOR ANXIOUSNESS ASSOCIATED WITH DEPRESSION    Bifidobacterium Infantis (ALIGN) 4 mg cap Take 1 Cap by mouth daily.  acetaminophen (TYLENOL EXTRA STRENGTH) 500 mg tablet Take  by mouth every six (6) hours as needed for Pain. No current facility-administered medications for this visit. There were no vitals filed for this visit.   Social History     Socioeconomic History    Marital status:      Spouse name: Not on file    Number of children: Not on file    Years of education: Not on file    Highest education level: Not on file   Occupational History    Not on file   Tobacco Use  Smoking status: Never Smoker    Smokeless tobacco: Never Used   Vaping Use    Vaping Use: Never used   Substance and Sexual Activity    Alcohol use: Not Currently    Drug use: Yes     Types: OTC    Sexual activity: Not Currently   Other Topics Concern    Not on file   Social History Narrative    Not on file     Social Determinants of Health     Financial Resource Strain:     Difficulty of Paying Living Expenses: Not on file   Food Insecurity:     Worried About Running Out of Food in the Last Year: Not on file    Arnoldo of Food in the Last Year: Not on file   Transportation Needs:     Lack of Transportation (Medical): Not on file    Lack of Transportation (Non-Medical):  Not on file   Physical Activity:     Days of Exercise per Week: Not on file    Minutes of Exercise per Session: Not on file   Stress:     Feeling of Stress : Not on file   Social Connections:     Frequency of Communication with Friends and Family: Not on file    Frequency of Social Gatherings with Friends and Family: Not on file    Attends Buddhism Services: Not on file    Active Member of [address] [place] [address] or Organizations: Not on file    Attends Club or Organization Meetings: Not on file    Marital Status: Not on file   Intimate Partner Violence:     Fear of Current or Ex-Partner: Not on file    Emotionally Abused: Not on file    Physically Abused: Not on file    Sexually Abused: Not on file   Housing Stability:     Unable to Pay for Housing in the Last Year: Not on file    Number of [place] in the Last Year: Not on file    Unstable Housing in the Last Year: Not on file         negative for chest pain, palpitations, syncope, orthopnea, paroxysmal nocturnal dyspnea, exertional chest pressure/discomfort, claudication, lower extremity edema    Review of Symptoms  11 systems reviewed, negative other than as stated in the HPI    Physical Exam:    Due to this being a TeleHealth evaluation, many elements of the physical examination are unable to be assessed. General: Well developed, in no acute distress, cooperative and alert  HEENT: Pupils equal/round. No marked JVD visible on video. Respiratory: No audible wheezing, no signs of respiratory distress, lips non cyanotic  Extremities:  No edema  Neuro: A&Ox3, speech clear, no facial droop, answering questions appropriately  Skin: Skin color is normal. No rashes or lesions. Non diaphoretic on visible skin during exam      Cardiology Labs:  Lab Results   Component Value Date/Time    Cholesterol, total 99 (L) 05/17/2022 11:12 AM    HDL Cholesterol 48 05/17/2022 11:12 AM    LDL, calculated 37 05/17/2022 11:12 AM    LDL, calculated 52.4 11/29/2021 10:59 AM    Triglyceride 62 05/17/2022 11:12 AM    CHOL/HDL Ratio 2.2 11/29/2021 10:59 AM       Lab Results   Component Value Date/Time    Sodium 140 05/23/2022 04:22 PM    Potassium 4.6 05/23/2022 04:22 PM    Chloride 108 05/23/2022 04:22 PM    CO2 30 05/23/2022 04:22 PM    Anion gap 2 (L) 05/23/2022 04:22 PM    Glucose 102 (H) 05/23/2022 04:22 PM    BUN 30 (H) 05/23/2022 04:22 PM    Creatinine 1.22 05/23/2022 04:22 PM    BUN/Creatinine ratio 25 (H) 05/23/2022 04:22 PM    GFR est AA >60 05/23/2022 04:22 PM    GFR est non-AA 56 (L) 05/23/2022 04:22 PM    Calcium 8.8 05/23/2022 04:22 PM    Bilirubin, total 0.6 05/17/2022 11:12 AM    Alk. phosphatase 54 05/17/2022 11:12 AM    Protein, total 6.5 05/17/2022 11:12 AM    Albumin 4.1 05/17/2022 11:12 AM    Globulin 3.7 02/24/2022 03:58 PM    A-G Ratio 1.7 05/17/2022 11:12 AM    ALT (SGPT) 8 05/17/2022 11:12 AM         Assessment:     Diagnoses and all orders for this visit:    1. Dilated cardiomyopathy ([address]    2. LBBB (left bundle branch block)    3. PVC (premature ventricular contraction)    4. SOB (shortness of breath)    5. Mixed hyperlipidemia        ICD-10-CM ICD-9-CM    1. Dilated cardiomyopathy (HCC)  I42.0 425.4    2. LBBB (left bundle branch block)  I44.7 426.3    3.  PVC (premature ventricular contraction)  I49.3 427.69    4. SOB (shortness of breath)  R06.02 786.05    5. Mixed hyperlipidemia  E78.2 272.2      No orders of the defined types were placed in this encounter. Plan:     All things considered he is done really well and tolerating small amounts of medication thus far. [place] we will see him in the office and repeat a limited echo to see where we stand. I do not think we need to get any follow-up blood work since we have not had to make any changes in his diuretic. I strongly encouraged him to avoid extremes of heat and to drink plenty of water but continue to avoid salt      current treatment plan is effective, no change in therapy  lab results and schedule of future lab studies reviewed with patient  reviewed diet, exercise and weight control  We discussed the expected course, resolution and complications of the diagnosis(es) in detail. Medication risks, benefits, costs, interactions, and alternatives were discussed as indicated. I advised him to contact the office if his condition worsens, changes or fails to improve as anticipated. He expressed understanding with the diagnosis(es) and plan    Herberth Mahajan MD  Follow-up and Dispositions    · Return in about 3 months (around 10/1/2022). Greater than 20 minutes was spent in direct video patient care, planning and chart review. This visit was conducted using [employer] services.

## 2022-08-10 ENCOUNTER — OFFICE VISIT (OUTPATIENT)
Dept: INTERNAL MEDICINE CLINIC | Age: 87
End: 2022-08-10
Payer: MEDICARE

## 2022-08-10 VITALS
DIASTOLIC BLOOD PRESSURE: 59 MMHG | TEMPERATURE: 98.2 F | HEART RATE: 75 BPM | BODY MASS INDEX: 23.41 KG/M2 | OXYGEN SATURATION: 98 % | HEIGHT: 71 IN | RESPIRATION RATE: 16 BRPM | SYSTOLIC BLOOD PRESSURE: 95 MMHG | WEIGHT: 167.2 LBS

## 2022-08-10 DIAGNOSIS — E55.9 VITAMIN D DEFICIENCY: ICD-10-CM

## 2022-08-10 DIAGNOSIS — I50.22 SYSTOLIC CHF, CHRONIC (HCC): ICD-10-CM

## 2022-08-10 DIAGNOSIS — E06.3 ACQUIRED AUTOIMMUNE HYPOTHYROIDISM: Primary | ICD-10-CM

## 2022-08-10 DIAGNOSIS — F41.1 ANXIETY STATE: ICD-10-CM

## 2022-08-10 PROCEDURE — G0463 HOSPITAL OUTPT CLINIC VISIT: HCPCS | Performed by: INTERNAL MEDICINE

## 2022-08-10 PROCEDURE — 1101F PT FALLS ASSESS-DOCD LE1/YR: CPT | Performed by: INTERNAL MEDICINE

## 2022-08-10 PROCEDURE — G8536 NO DOC ELDER MAL SCRN: HCPCS | Performed by: INTERNAL MEDICINE

## 2022-08-10 PROCEDURE — G9717 DOC PT DX DEP/BP F/U NT REQ: HCPCS | Performed by: INTERNAL MEDICINE

## 2022-08-10 PROCEDURE — G8427 DOCREV CUR MEDS BY ELIG CLIN: HCPCS | Performed by: INTERNAL MEDICINE

## 2022-08-10 PROCEDURE — G8420 CALC BMI NORM PARAMETERS: HCPCS | Performed by: INTERNAL MEDICINE

## 2022-08-10 PROCEDURE — 99214 OFFICE O/P EST MOD 30 MIN: CPT | Performed by: INTERNAL MEDICINE

## 2022-08-10 NOTE — PROGRESS NOTES
HISTORY OF PRESENT ILLNESS  Grazyna Pena is a 80 y.o. male. Brian Garber is seen today accompanied by his wife for followup of CHF and other medical concerns. 1. CHF. Clinically stable at this time. He is up to date with Cardiology and is feeling better. He plans on switching his cardiologist to [place] as this is closer to their home in [facility]. 2. Vitamin D deficiency. He has been on weekly vitamin D per Cardiology. He wonders if he should continue this. Advised him to asses with his new PCP regarding the vitamin D level. 3. Hypothyroidism. Up to date and clinically stable. Review of Systems   Constitutional:  Negative for chills, fever and weight loss. HENT:  Positive for hearing loss. Respiratory: Negative. Cardiovascular:  Negative for chest pain, palpitations, leg swelling and PND. Musculoskeletal:  Negative for myalgias. Neurological:  Negative for focal weakness. Psychiatric/Behavioral:  The patient is nervous/anxious. Physical Exam  Vitals and nursing note reviewed. Constitutional:       General: He is not in acute distress. HENT:      Right Ear: There is no impacted cerumen. Left Ear: There is no impacted cerumen. Neck:      Vascular: No carotid bruit. Cardiovascular:      Rate and Rhythm: Normal rate and regular rhythm. Occasional Extrasystoles are present. Heart sounds: No murmur heard. No friction rub. No gallop. Pulmonary:      Effort: Pulmonary effort is normal. No respiratory distress. Breath sounds: Normal breath sounds. Musculoskeletal:      Right lower leg: No edema. Left lower leg: No edema. ASSESSMENT and PLAN  Diagnoses and all orders for this visit:    1. Acquired autoimmune hypothyroidism  -     TSH 3RD GENERATION; Future    2. Anxiety state    3. Vitamin D deficiency  -     VITAMIN D, 25 HYDROXY; Future    4.  Systolic CHF, chronic ([facility]

## 2022-08-11 LAB
25(OH)D3 SERPL-MCNC: 79 NG/ML (ref 30–100)
TSH SERPL DL<=0.05 MIU/L-ACNC: 3.57 UIU/ML (ref 0.36–3.74)

## 2022-08-18 DIAGNOSIS — F41.1 ANXIETY STATE: ICD-10-CM

## 2022-08-18 DIAGNOSIS — E06.3 ACQUIRED AUTOIMMUNE HYPOTHYROIDISM: ICD-10-CM

## 2022-08-19 RX ORDER — ESCITALOPRAM OXALATE 10 MG/1
TABLET ORAL
Qty: 90 TABLET | Refills: 1 | Status: SHIPPED | OUTPATIENT
Start: 2022-08-19

## 2022-08-19 RX ORDER — LEVOTHYROXINE SODIUM 88 UG/1
TABLET ORAL
Qty: 30 TABLET | Refills: 5 | Status: SHIPPED | OUTPATIENT
Start: 2022-08-19

## 2022-08-25 DIAGNOSIS — R06.02 SOB (SHORTNESS OF BREATH): ICD-10-CM

## 2022-08-25 DIAGNOSIS — I44.7 LBBB (LEFT BUNDLE BRANCH BLOCK): ICD-10-CM

## 2022-08-25 DIAGNOSIS — I49.3 PVC (PREMATURE VENTRICULAR CONTRACTION): ICD-10-CM

## 2022-08-25 DIAGNOSIS — I42.0 DILATED CARDIOMYOPATHY (HCC): ICD-10-CM

## 2022-08-26 RX ORDER — SACUBITRIL AND VALSARTAN 24; 26 MG/1; MG/1
TABLET, FILM COATED ORAL
Qty: 90 TABLET | Refills: 0 | Status: SHIPPED | OUTPATIENT
Start: 2022-08-26

## 2022-08-26 NOTE — TELEPHONE ENCOUNTER
Requested Prescriptions     Signed Prescriptions Disp Refills    sacubitriL-valsartan (Entresto) 24-26 mg tablet 90 Tablet 0     Sig: TAKE ONE-HALF (1/2) TABLET TWICE A DAY     Authorizing Provider: Vandana Romero     Ordering User: Otto Liriano    Per Dr. Jeyson Rust verbal orders

## 2022-09-15 ENCOUNTER — ANCILLARY PROCEDURE (OUTPATIENT)
Dept: CARDIOLOGY CLINIC | Age: 87
End: 2022-09-15
Payer: MEDICARE

## 2022-09-15 ENCOUNTER — OFFICE VISIT (OUTPATIENT)
Dept: CARDIOLOGY CLINIC | Age: 87
End: 2022-09-15

## 2022-09-15 VITALS
DIASTOLIC BLOOD PRESSURE: 59 MMHG | HEIGHT: 71 IN | WEIGHT: 169 LBS | SYSTOLIC BLOOD PRESSURE: 95 MMHG | BODY MASS INDEX: 23.66 KG/M2

## 2022-09-15 VITALS
SYSTOLIC BLOOD PRESSURE: 92 MMHG | RESPIRATION RATE: 16 BRPM | BODY MASS INDEX: 23.66 KG/M2 | WEIGHT: 169 LBS | HEIGHT: 71 IN | OXYGEN SATURATION: 97 % | HEART RATE: 60 BPM | DIASTOLIC BLOOD PRESSURE: 60 MMHG

## 2022-09-15 DIAGNOSIS — I42.0 DILATED CARDIOMYOPATHY (HCC): Primary | ICD-10-CM

## 2022-09-15 DIAGNOSIS — E78.2 MIXED HYPERLIPIDEMIA: ICD-10-CM

## 2022-09-15 DIAGNOSIS — I49.3 PVC (PREMATURE VENTRICULAR CONTRACTION): ICD-10-CM

## 2022-09-15 DIAGNOSIS — I44.7 LBBB (LEFT BUNDLE BRANCH BLOCK): ICD-10-CM

## 2022-09-15 DIAGNOSIS — I42.0 DILATED CARDIOMYOPATHY (HCC): ICD-10-CM

## 2022-09-15 LAB
ECHO AO ASC DIAM: 3.6 CM
ECHO AO ASCENDING AORTA INDEX: 1.84 CM/M2
ECHO AO ROOT DIAM: 3.1 CM
ECHO AO ROOT INDEX: 1.58 CM/M2
ECHO AV PEAK GRADIENT: 7 MMHG
ECHO AV PEAK VELOCITY: 1.4 M/S
ECHO AV VELOCITY RATIO: 0.5
ECHO EST RA PRESSURE: 3 MMHG
ECHO LA DIAMETER INDEX: 2.09 CM/M2
ECHO LA DIAMETER: 4.1 CM
ECHO LA TO AORTIC ROOT RATIO: 1.32
ECHO LA VOL 2C: 102 ML (ref 18–58)
ECHO LA VOL 4C: 64 ML (ref 18–58)
ECHO LA VOL BP: 88 ML (ref 18–58)
ECHO LA VOL/BSA BIPLANE: 45 ML/M2 (ref 16–34)
ECHO LA VOLUME AREA LENGTH: 92 ML
ECHO LA VOLUME INDEX A2C: 52 ML/M2 (ref 16–34)
ECHO LA VOLUME INDEX A4C: 33 ML/M2 (ref 16–34)
ECHO LA VOLUME INDEX AREA LENGTH: 47 ML/M2 (ref 16–34)
ECHO LV E' LATERAL VELOCITY: 6 CM/S
ECHO LV E' SEPTAL VELOCITY: 4 CM/S
ECHO LV EDV A2C: 188 ML
ECHO LV EDV A4C: 133 ML
ECHO LV EDV BP: 161 ML (ref 67–155)
ECHO LV EDV INDEX A4C: 68 ML/M2
ECHO LV EDV INDEX BP: 82 ML/M2
ECHO LV EDV NDEX A2C: 96 ML/M2
ECHO LV EJECTION FRACTION A2C: 32 %
ECHO LV EJECTION FRACTION A4C: 8 %
ECHO LV EJECTION FRACTION BIPLANE: 22 % (ref 55–100)
ECHO LV ESV A2C: 128 ML
ECHO LV ESV A4C: 122 ML
ECHO LV ESV BP: 126 ML (ref 22–58)
ECHO LV ESV INDEX A2C: 65 ML/M2
ECHO LV ESV INDEX A4C: 62 ML/M2
ECHO LV ESV INDEX BP: 64 ML/M2
ECHO LV FRACTIONAL SHORTENING: 10 % (ref 28–44)
ECHO LV INTERNAL DIMENSION DIASTOLE INDEX: 3.01 CM/M2
ECHO LV INTERNAL DIMENSION DIASTOLIC: 5.9 CM (ref 4.2–5.9)
ECHO LV INTERNAL DIMENSION SYSTOLIC INDEX: 2.7 CM/M2
ECHO LV INTERNAL DIMENSION SYSTOLIC: 5.3 CM
ECHO LV ISOVOLUMETRIC RELAXATION TIME (IVRT): 83.7 MS
ECHO LV IVSD: 1.4 CM (ref 0.6–1)
ECHO LV MASS 2D: 416.3 G (ref 88–224)
ECHO LV MASS INDEX 2D: 212.4 G/M2 (ref 49–115)
ECHO LV POSTERIOR WALL DIASTOLIC: 1.6 CM (ref 0.6–1)
ECHO LV RELATIVE WALL THICKNESS RATIO: 0.54
ECHO LVOT PEAK GRADIENT: 2 MMHG
ECHO LVOT PEAK VELOCITY: 0.7 M/S
ECHO MV "A" WAVE DURATION: 150.3 MSEC
ECHO MV A VELOCITY: 0.84 M/S
ECHO MV E DECELERATION TIME (DT): 224 MS
ECHO MV E VELOCITY: 0.66 M/S
ECHO MV E/A RATIO: 0.79
ECHO MV E/E' LATERAL: 11
ECHO MV E/E' RATIO (AVERAGED): 13.75
ECHO MV E/E' SEPTAL: 16.5
ECHO RIGHT VENTRICULAR SYSTOLIC PRESSURE (RVSP): 42 MMHG
ECHO RV INTERNAL DIMENSION: 3.6 CM
ECHO TV REGURGITANT MAX VELOCITY: 3.13 M/S
ECHO TV REGURGITANT PEAK GRADIENT: 39 MMHG

## 2022-09-15 PROCEDURE — 1101F PT FALLS ASSESS-DOCD LE1/YR: CPT | Performed by: SPECIALIST

## 2022-09-15 PROCEDURE — G8427 DOCREV CUR MEDS BY ELIG CLIN: HCPCS | Performed by: SPECIALIST

## 2022-09-15 PROCEDURE — G8536 NO DOC ELDER MAL SCRN: HCPCS | Performed by: SPECIALIST

## 2022-09-15 PROCEDURE — G8420 CALC BMI NORM PARAMETERS: HCPCS | Performed by: SPECIALIST

## 2022-09-15 PROCEDURE — G9717 DOC PT DX DEP/BP F/U NT REQ: HCPCS | Performed by: SPECIALIST

## 2022-09-15 PROCEDURE — 93306 TTE W/DOPPLER COMPLETE: CPT | Performed by: SPECIALIST

## 2022-09-15 PROCEDURE — 99213 OFFICE O/P EST LOW 20 MIN: CPT | Performed by: SPECIALIST

## 2022-09-15 PROCEDURE — 1123F ACP DISCUSS/DSCN MKR DOCD: CPT | Performed by: SPECIALIST

## 2022-09-15 RX ORDER — DESONIDE 0.5 MG/ML
LOTION TOPICAL
COMMUNITY
Start: 2022-09-09

## 2022-09-15 RX ORDER — CHOLECALCIFEROL (VITAMIN D3) 125 MCG
CAPSULE ORAL
COMMUNITY

## 2022-09-15 NOTE — PROGRESS NOTES
Heart muscle is a little stronger and valves less leaky. This is a good sign and suggests that the medications are helping. No new med changes for now.

## 2022-09-15 NOTE — PROGRESS NOTES
HISTORY OF PRESENT ILLNESS  Josselin Mario is a 80 y.o. male     SUMMARY:   Problem List  Date Reviewed: 9/15/2022            Codes Class Noted    SOB (shortness of breath) ICD-10-CM: R06.02  ICD-9-CM: 786.05  4/3/2022        MGUS (monoclonal gammopathy of unknown significance) ICD-10-CM: D47.2  ICD-9-CM: 273.1  4/10/2018        Diarrhea following gastrointestinal surgery ICD-10-CM: R19.7, Z98.890  ICD-9-CM: 564.4  11/1/2017        Cataract ICD-10-CM: H26.9  ICD-9-CM: 366.9  Unknown    Overview Signed 4/7/2016  2:24 PM by Francine Galvez     bilateral             Advance directive on file ICD-10-CM: Z78.9  ICD-9-CM: V49.89  4/7/2016        Situational depression ICD-10-CM: F43.21  ICD-9-CM: 309.0  7/21/2014        Acquired autoimmune hypothyroidism ICD-10-CM: E06.3  ICD-9-CM: 244.8  4/14/2014        Proteinuria ICD-10-CM: R80.9  ICD-9-CM: 791.0  10/16/2013        Impaired fasting glucose ICD-10-CM: R73.01  ICD-9-CM: 790.21  10/8/2013        LBBB (left bundle branch block) ICD-10-CM: I44.7  ICD-9-CM: 426.3  9/28/2012        BPH (benign prostatic hyperplasia) ICD-10-CM: N40.0  ICD-9-CM: 600.00  9/27/2012        Elevated blood pressure reading without diagnosis of hypertension ICD-10-CM: R03.0  ICD-9-CM: 796.2  9/7/2011        Anxiety state ICD-10-CM: F41.1  ICD-9-CM: 300.00  8/24/2011        PVC (premature ventricular contraction) ICD-10-CM: I49.3  ICD-9-CM: 427.69  8/24/2011        Personal history of colonic polyps ICD-10-CM: Z86.010  ICD-9-CM: V12.72  7/16/2009        Hyperlipidemia ICD-10-CM: E78.5  ICD-9-CM: 272.4  7/16/2009        Diarrhea ICD-10-CM: R19.7  ICD-9-CM: 787.91  7/16/2009        Shingles ICD-10-CM: B02.9  ICD-9-CM: 053.9  7/16/2009        Raynaud disease ICD-10-CM: I73.00  ICD-9-CM: 443.0  7/16/2009        Palpitations ICD-10-CM: R00.2  ICD-9-CM: 785.1  7/16/2009           Current Outpatient Medications on File Prior to Visit   Medication Sig    desonide (TRIDESILON) 0.05 % topical lotion PLEASE SEE ATTACHED FOR DETAILED DIRECTIONS    cholecalciferol, vitamin D3, (Vitamin D3) 50 mcg (2,000 unit) tab Take  by mouth. sacubitriL-valsartan (Entresto) 24-26 mg tablet TAKE ONE-HALF (1/2) TABLET TWICE A DAY    levothyroxine (SYNTHROID) 88 mcg tablet TAKE 1 TABLET DAILY (NEW DOSE)    escitalopram oxalate (LEXAPRO) 10 mg tablet TAKE 1 TABLET DAILY FOR ANXIOUSNESS ASSOCIATED WITH DEPRESSION    bumetanide (BUMEX) 2 mg tablet Take 0.5 Tablets by mouth daily. Or as advised by provider    fluticasone propionate (FLONASE) 50 mcg/actuation nasal spray 2 Sprays by Both Nostrils route daily. finasteride (Proscar) 5 mg tablet Take 1 Tablet by mouth daily (with dinner). colestipoL (COLESTID) 1 gram tablet 2 PO every day (Patient taking differently: 1 PO every day)    Bifidobacterium Infantis 4 mg cap Take 1 Cap by mouth daily. acetaminophen (TYLENOL) 500 mg tablet Take  by mouth every six (6) hours as needed for Pain. No current facility-administered medications on file prior to visit. CARDIOLOGY STUDIES TO DATE:  7/11 lvh and mild mr, otherwise normal echo    8/12. Lexiscan reversible inf defect vs attenuation    5/22 echo normal lv size with lvef 10-15%, mild rve with mod decreased rvef, mark, lae, mod to severe mr, mod tr with pa pressure 77mm    Chief Complaint   Patient presents with    Follow-up     HPI :  He is doing much better than when we last met. He is got no edema minimal shortness of breath and although his blood pressure is low he is asymptomatic and tolerating his medications. According to his wife he still naps frequently during the day but this is nothing new and he is now wearing oxygen at night.   CARDIAC ROS:   negative for chest pain, dyspnea, palpitations, syncope, orthopnea, paroxysmal nocturnal dyspnea, exertional chest pressure/discomfort    Family History   Problem Relation Age of Onset    Stroke Father     Diabetes Brother     Kidney Disease Brother     Cancer Brother lung    Cancer Mother         BREAST WITH METS TO LUNG    Heart Disease Mother     Lung Disease Sister     Cancer Sister         COLON    Cancer Sister         MELANOMA    Heart Attack Sister        Past Medical History:   Diagnosis Date    Arrhythmia     PAT, PVC'S LAST ABOUT A YEAR AGO    Arthritis     OSTEO    BPH 7/16/2009    Cancer ([facility] [address]     basal cell skin cancer, MELANOMA    Cataract     bilateral    Cataract     bilateral    Cellulitis 10/15/2018    left hand     Depression 2011    Diarrhea 7/16/2009    Hyperlipidemia 7/16/2009    Hypertension     NOT TREATED    Other ill-defined conditions(799.89)     bph    Other ill-defined conditions(799.89)     colon polyps    Other ill-defined conditions(799.89)     high cholesterol-PT DENIES    Other ill-defined conditions(799.89)     shingles    Other ill-defined conditions(799.89)     raynauds    Palpitations 7/16/2009    Personal history of colonic polyps 7/16/2009    Proteinuria     Psychiatric disorder     ANXIETY AND DEPRESSION    Raynaud disease 7/16/2009    Shingles 7/16/2009    Skin lesions 10/2018    lesions removed by DERM from face and ear     Unspecified sleep apnea     NO CPAP       GENERAL ROS:  A comprehensive review of systems was negative except for that written in the HPI.     Visit Vitals  BP 92/60   Pulse 60   Resp 16   Ht 5' 11\" (1.803 m)   Wt 169 lb (76.7 kg)   SpO2 97%   BMI 23.57 kg/m²       Wt Readings from Last 3 Encounters:   09/15/22 169 lb (76.7 kg)   09/15/22 169 lb (76.7 kg)   08/10/22 167 lb 3.2 oz (75.8 kg)            BP Readings from Last 3 Encounters:   09/15/22 92/60   09/15/22 (!) 95/59   08/10/22 (!) 95/59       PHYSICAL EXAM  General appearance: alert, cooperative, no distress, appears stated age  Neurologic: Alert and oriented X 3  Neck: supple, symmetrical, trachea midline, no adenopathy, no carotid bruit, and no JVD  Lungs: clear to auscultation bilaterally  Heart: regular rate and rhythm, S1, S2 normal, no murmur, click, rub or gallop  Extremities: extremities normal, atraumatic, no cyanosis or edema    Lab Results   Component Value Date/Time    Cholesterol, total 99 (L) 05/17/2022 11:12 AM    Cholesterol, total 131 11/29/2021 10:59 AM    Cholesterol, total 127 11/13/2020 09:36 AM    Cholesterol, total 138 11/13/2019 09:58 AM    Cholesterol, total 114 10/30/2018 09:11 AM    HDL Cholesterol 48 05/17/2022 11:12 AM    HDL Cholesterol 59 11/29/2021 10:59 AM    HDL Cholesterol 50 11/13/2020 09:36 AM    HDL Cholesterol 60 11/13/2019 09:58 AM    HDL Cholesterol 55 10/30/2018 09:11 AM    LDL, calculated 37 05/17/2022 11:12 AM    LDL, calculated 52.4 11/29/2021 10:59 AM    LDL, calculated 58.6 11/13/2020 09:36 AM    LDL, calculated 64 11/13/2019 09:58 AM    LDL, calculated 42 10/30/2018 09:11 AM    LDL, calculated 60 06/13/2017 01:42 PM    Triglyceride 62 05/17/2022 11:12 AM    Triglyceride 98 11/29/2021 10:59 AM    Triglyceride 92 11/13/2020 09:36 AM    Triglyceride 72 11/13/2019 09:58 AM    Triglyceride 84 10/30/2018 09:11 AM    CHOL/HDL Ratio 2.2 11/29/2021 10:59 AM    CHOL/HDL Ratio 2.5 11/13/2020 09:36 AM    CHOL/HDL Ratio 2.8 09/23/2010 10:03 AM    CHOL/HDL Ratio 3.2 09/22/2009 10:15 AM     ASSESSMENT :      He is stable and minimally if at all symptomatic at this point on a reasonable medical regimen. He had an echocardiogram today and will follow his results up by phone. I do not think we can push his meds any further given his low blood pressure. current treatment plan is effective, no change in therapy  lab results and schedule of future lab studies reviewed with patient  reviewed diet, exercise and weight control    Encounter Diagnoses   Name Primary?     Dilated cardiomyopathy (HCC) Yes    Mixed hyperlipidemia     LBBB (left bundle branch block)     PVC (premature ventricular contraction)      Orders Placed This Encounter    desonide (TRIDESILON) 0.05 % topical lotion    cholecalciferol, vitamin D3, (Vitamin D3) 50 mcg (2,000 unit) tab       Follow-up and Dispositions    Return in about 3 months (around 12/15/2022). Jhony [facility], MD  9/15/2022  Please note that this dictation was completed with [employer], the computer voice recognition software. Quite often unanticipated grammatical, syntax, homophones, and other interpretive errors are inadvertently transcribed by the computer software. Please disregard these errors. Please excuse any errors that have escaped final proofreading. Thank you.

## 2022-09-16 ENCOUNTER — TELEPHONE (OUTPATIENT)
Dept: CARDIOLOGY CLINIC | Age: 87
End: 2022-09-16

## 2022-09-16 NOTE — TELEPHONE ENCOUNTER
Called pt. Spoke with his wife. Verified patient's identity with two identifiers. Notified her of results and Dr. Susan David message. She will notify pt and denied further questions or concerns.

## 2022-09-16 NOTE — TELEPHONE ENCOUNTER
----- Message from Salvatore Melchor MD sent at 9/15/2022  5:20 PM EDT -----  Heart muscle is a little stronger and valves less leaky. This is a good sign and suggests that the medications are helping. No new med changes for now.

## 2022-10-27 ENCOUNTER — TELEPHONE (OUTPATIENT)
Dept: CARDIOLOGY CLINIC | Age: 87
End: 2022-10-27

## 2022-10-27 DIAGNOSIS — I50.22 CHRONIC SYSTOLIC HEART FAILURE (HCC): Primary | ICD-10-CM

## 2022-10-27 NOTE — TELEPHONE ENCOUNTER
Yung Moreno is calling because he feels the patient should have an another echocardiogram in a couple of months. The reason he feels that he should have one is because the patient has CHF and he saw on the echocardiogram that he had about 6 weeks ago in early September some improvement and changes. He would like the patient to have another one in 6 weeks around the time he has to come into see [facility] on 12/8/22    Please call back for more details.     [phone]

## 2022-10-28 NOTE — TELEPHONE ENCOUNTER
Called pt. Spoke with his wife. HIPAA and 2 pt identifiers verified. Notified her of messages below and Dr. Matthew Dimas agreed to schedule echo with followu. Rescheduled 12/8 appointment to include an echo. She denied further questions or concerns. Called # below for Dr. Jorge Tracy. I could tell phone was answered, but seems it was by accident because another conversation was going on. Waited about 30 seconds to see if someone would get on the phone before I hung up. Faxed message to Dr. Jorge Tracy.      Future Appointments   Date Time Provider [place]   12/6/2022  1:00 PM Satinder Denton MD [facility] ELIZABETH BS AMB   12/13/2022  2:00 PM BRANDY ORTIZ BS AMB   12/13/2022  2:40 PM MD TERENCE Yuen BS AMB

## 2022-10-28 NOTE — TELEPHONE ENCOUNTER
We got a note from him, he has made some med changes so it is reasonable to repeat echo as suggested

## 2022-10-30 PROBLEM — I50.22 CHRONIC SYSTOLIC HEART FAILURE (HCC): Status: ACTIVE | Noted: 2022-10-30

## 2022-11-28 DIAGNOSIS — I49.3 PVC (PREMATURE VENTRICULAR CONTRACTION): ICD-10-CM

## 2022-11-28 DIAGNOSIS — R06.02 SOB (SHORTNESS OF BREATH): ICD-10-CM

## 2022-11-28 DIAGNOSIS — I44.7 LBBB (LEFT BUNDLE BRANCH BLOCK): ICD-10-CM

## 2022-11-28 DIAGNOSIS — I42.0 DILATED CARDIOMYOPATHY (HCC): ICD-10-CM

## 2022-11-29 RX ORDER — BUMETANIDE 2 MG/1
TABLET ORAL
Qty: 90 TABLET | Refills: 3 | Status: SHIPPED | OUTPATIENT
Start: 2022-11-29

## 2022-11-29 RX ORDER — SACUBITRIL AND VALSARTAN 24; 26 MG/1; MG/1
TABLET, FILM COATED ORAL
Qty: 90 TABLET | Refills: 3 | Status: SHIPPED | OUTPATIENT
Start: 2022-11-29

## 2022-11-29 NOTE — TELEPHONE ENCOUNTER
Requested Prescriptions     Signed Prescriptions Disp Refills    Entresto 24-26 mg tablet 90 Tablet 3     Sig: TAKE ONE-HALF (1/2) TABLET TWICE A DAY     Authorizing Provider: Wilhemina Olszewski     Ordering User: KYLER Winter    bumetanide (BUMEX) 2 mg tablet 90 Tablet 3     Sig: TAKE ONE-HALF (1/2) TABLET DAILY OR AS ADVISED BY PROVIDER     Authorizing Provider: Wilhemina Olszewski     Ordering User: Fuda Hill    Per Dr. Jesus Alonzo verbal orders

## 2022-12-06 ENCOUNTER — OFFICE VISIT (OUTPATIENT)
Dept: INTERNAL MEDICINE CLINIC | Age: 87
End: 2022-12-06
Payer: MEDICARE

## 2022-12-06 VITALS
BODY MASS INDEX: 22.73 KG/M2 | SYSTOLIC BLOOD PRESSURE: 90 MMHG | HEART RATE: 60 BPM | WEIGHT: 162.4 LBS | TEMPERATURE: 97.8 F | HEIGHT: 71 IN | RESPIRATION RATE: 16 BRPM | DIASTOLIC BLOOD PRESSURE: 50 MMHG | OXYGEN SATURATION: 96 %

## 2022-12-06 DIAGNOSIS — N18.30 STAGE 3 CHRONIC KIDNEY DISEASE, UNSPECIFIED WHETHER STAGE 3A OR 3B CKD (HCC): ICD-10-CM

## 2022-12-06 DIAGNOSIS — F33.42 RECURRENT MAJOR DEPRESSIVE DISORDER, IN FULL REMISSION (HCC): Primary | ICD-10-CM

## 2022-12-06 DIAGNOSIS — D47.2 MGUS (MONOCLONAL GAMMOPATHY OF UNKNOWN SIGNIFICANCE): ICD-10-CM

## 2022-12-06 DIAGNOSIS — Z98.890 DIARRHEA FOLLOWING GASTROINTESTINAL SURGERY: ICD-10-CM

## 2022-12-06 DIAGNOSIS — R73.03 PREDIABETES: ICD-10-CM

## 2022-12-06 DIAGNOSIS — I50.22 CHRONIC SYSTOLIC HEART FAILURE (HCC): ICD-10-CM

## 2022-12-06 DIAGNOSIS — M54.32 SCIATICA OF LEFT SIDE: ICD-10-CM

## 2022-12-06 DIAGNOSIS — N40.0 BENIGN PROSTATIC HYPERPLASIA WITHOUT LOWER URINARY TRACT SYMPTOMS: ICD-10-CM

## 2022-12-06 DIAGNOSIS — H61.23 BILATERAL IMPACTED CERUMEN: ICD-10-CM

## 2022-12-06 DIAGNOSIS — R19.7 DIARRHEA FOLLOWING GASTROINTESTINAL SURGERY: ICD-10-CM

## 2022-12-06 DIAGNOSIS — Z87.898 HISTORY OF PREDIABETES: ICD-10-CM

## 2022-12-06 DIAGNOSIS — E06.3 ACQUIRED AUTOIMMUNE HYPOTHYROIDISM: ICD-10-CM

## 2022-12-06 PROCEDURE — G0463 HOSPITAL OUTPT CLINIC VISIT: HCPCS | Performed by: STUDENT IN AN ORGANIZED HEALTH CARE EDUCATION/TRAINING PROGRAM

## 2022-12-06 PROCEDURE — 69210 REMOVE IMPACTED EAR WAX UNI: CPT | Performed by: STUDENT IN AN ORGANIZED HEALTH CARE EDUCATION/TRAINING PROGRAM

## 2022-12-06 PROCEDURE — 99214 OFFICE O/P EST MOD 30 MIN: CPT | Performed by: STUDENT IN AN ORGANIZED HEALTH CARE EDUCATION/TRAINING PROGRAM

## 2022-12-06 RX ORDER — FLUTICASONE PROPIONATE 50 MCG
2 SPRAY, SUSPENSION (ML) NASAL DAILY
Qty: 1 EACH | Refills: 3 | Status: SHIPPED | OUTPATIENT
Start: 2022-12-06

## 2022-12-06 RX ORDER — SPIRONOLACTONE 25 MG/1
12.5 TABLET ORAL DAILY
COMMUNITY
Start: 2022-10-24

## 2022-12-06 RX ORDER — MONTELUKAST SODIUM 4 MG/1
TABLET, CHEWABLE ORAL
Qty: 180 TABLET | Refills: 2
Start: 2022-12-06

## 2022-12-06 NOTE — PROGRESS NOTES
Kateryna Ortiz is a 80y.o. year old male who is a new patient to me today (12/06/22). He was previous followed by Dr Gisela Camp. Assessment & Plan:   1. Recurrent major depressive disorder, in full remission ([facility] [address]  Assessment & Plan:  Controlled, cont lexapro given his hx replasing depression   2. Sciatica of left side  Comments:  refer to PT for stretching  Orders:  -     REFERRAL TO PHYSICAL THERAPY  3. Stage 3 chronic kidney disease, unspecified whether stage 3a or 3b CKD ([facility] [address]  Comments:  check Cr for stability  Orders:  -     METABOLIC PANEL, BASIC; Future  4. MGUS (monoclonal gammopathy of unknown significance)  Assessment & Plan:  We discussed if there is concern for progression he should go back to oncology. For now, I will keep an eye on his blood counts and renal function. Orders:  -     CBC WITH AUTOMATED DIFF; Future  5. Acquired autoimmune hypothyroidism  Assessment & Plan:  TSH 8/2022 in range. We should keep TSH on high side of normal given his age. Cont synthroid   6. Benign prostatic hyperplasia without lower urinary tract symptoms  Assessment & Plan:  Cont finasteride. We discussed sx of BPH, he has frequency due to diuretic usage. He feels he is emptying his bladder completely most of the time. If this changes, would start tamsulosin   7. History of prediabetes  Assessment & Plan:  Check A1c today given his history   8. Prediabetes  -     HEMOGLOBIN A1C WITH EAG; Future  9. Diarrhea following gastrointestinal surgery  Assessment & Plan:  Cont colestipol, this is working well. Orders:  -     colestipoL (COLESTID) 1 gram tablet; 1 PO every day, No Print, Disp-180 Tablet, R-2  10. Bilateral impacted cerumen  Comments:  successful dis-impaction and irritation with nurse today. advised to start dubrox BIW   Orders:  -     REMOVE IMPACTED EAR WAX  11.  Chronic systolic heart failure ([facility] [address]  Assessment & Plan:  Encouraged to discuss breast tenderness with cardiologist since this is a known side effect of spironolactone. BP low in clinic, better on home records. He denies dizziness. I see that he is not on a statin, I will defer this to his cardiologist       Health Maintenance - defer      RTC: 3 mo medicare annual     Subjective:   Jia Churchill was seen today for Establish Care    Depression  - taking lexapro, feels his mood is stable on this   - he is not sure if he should continue the medication. He tried to come off it in the past and felt low mood    Hypothyroidism  - taking synthroid  - TSH upper limit normal 8/2022    BPH  - taking finasteride  - very rarely having incomplete emptying, doesn't feel this is frequent or significant     Diarrhea  - taking colestipol daily, having 2 stools/day    Sciatica pain - L side. In the past it has been on the R side. It gets better after he walks around in the morning. Episodes has been self-resolving in the past.     HF  - Dr Byron Marquez, Dr Khadijah Moralez (advanced HF, saw one time), Dr Jeanie Bailey ([facility] advanced HF)  - entresto, bumex, spironolactone   - breast tenderness since starting spironolactone     Review of Systems   All other systems reviewed and are negative.     PMHx    Patient Active Problem List   Diagnosis Code    Personal history of colonic polyps Z86.010    Hyperlipidemia E78.5    Raynaud disease I73.00    PVC (premature ventricular contraction) I49.3    Benign prostatic hyperplasia without lower urinary tract symptoms N40.0    LBBB (left bundle branch block) I44.7    History of prediabetes Z87.898    Proteinuria R80.9    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    Recurrent major depressive disorder, in full remission ([facility] [address] F33.42    Cataract H26.9    Advance directive on file Z78.9    Diarrhea following gastrointestinal surgery R19.7, Z98.890    MGUS (monoclonal gammopathy of unknown significance) D47.2    SOB (shortness of breath) R06.02    Chronic systolic heart failure (HCC) I50.22    Sciatica of left side M54.32    Stage 3 chronic kidney disease, unspecified whether stage 3a or 3b CKD ([facility] [address] N18.30       Prior to Admission medications    Medication Sig Start Date End Date Taking? Authorizing Provider   spironolactone (ALDACTONE) 25 mg tablet Take 12.5 mg by mouth daily. 10/24/22  Yes Provider, Historical   colestipoL (COLESTID) 1 gram tablet 1 PO every day 12/6/22  Yes Misael Beck MD   fluticasone propionate (FLONASE) 50 mcg/actuation nasal spray 2 Sprays by Both Nostrils route daily. 12/6/22  Yes Misael Beck MD   Entresto 24-26 mg tablet TAKE ONE-HALF (1/2) TABLET TWICE A DAY 11/29/22  Yes Rakel Garza III, MD   bumetanide (BUMEX) 2 mg tablet TAKE ONE-HALF (1/2) TABLET DAILY OR AS ADVISED BY PROVIDER 11/29/22  Yes Rajinder Cooper MD   desonide (TRIDESILON) 0.05 % topical lotion PLEASE SEE ATTACHED FOR DETAILED DIRECTIONS 9/9/22  Yes Provider, Historical   cholecalciferol, vitamin D3, 50 mcg (2,000 unit) tab Take  by mouth. Yes Provider, Historical   levothyroxine (SYNTHROID) 88 mcg tablet TAKE 1 TABLET DAILY (NEW DOSE) 8/19/22  Yes Dilcia Gomez MD   escitalopram oxalate (LEXAPRO) 10 mg tablet TAKE 1 TABLET DAILY FOR ANXIOUSNESS ASSOCIATED WITH DEPRESSION 8/19/22  Yes Dilcia Gomez MD   finasteride (Proscar) 5 mg tablet Take 1 Tablet by mouth daily (with dinner). 11/24/21  Yes Dilcia Gomez MD   Bifidobacterium Infantis 4 mg cap Take 1 Cap by mouth daily. Yes Provider, Historical   acetaminophen (TYLENOL) 500 mg tablet Take  by mouth every six (6) hours as needed for Pain. Yes Provider, Historical   fluticasone propionate (FLONASE) 50 mcg/actuation nasal spray 2 Sprays by Both Nostrils route daily.   Patient not taking: Reported on 12/6/2022 12/6/22  Provider, Historical   colestipoL (COLESTID) 1 gram tablet 2 PO every day  Patient taking differently: 1 PO every day 11/24/21 12/6/22  Chidi Jeronimo MD       The following sections were reviewed & updated as appropriate: Problem List, Allergies, PMH, PSH, FH, and SH. Objective:   Visit Vitals  BP (!) 90/50   Pulse 60   Temp 97.8 °F (36.6 °C) (Temporal)   Resp 16   Ht 5' 11\" (1.803 m)   Wt 162 lb 6.4 oz (73.7 kg)   SpO2 96%   BMI 22.65 kg/m²       Physical Exam  Constitutional:       General: He is not in acute distress. HENT:      Right Ear: There is impacted cerumen. Left Ear: There is impacted cerumen. Eyes:      Extraocular Movements: Extraocular movements intact. Conjunctiva/sclera: Conjunctivae normal.   Cardiovascular:      Rate and Rhythm: Normal rate and regular rhythm. Heart sounds: No murmur heard. Pulmonary:      Effort: Pulmonary effort is normal. No respiratory distress. Abdominal:      General: Bowel sounds are normal.      Palpations: Abdomen is soft. Musculoskeletal:      Right lower leg: No edema. Left lower leg: No edema. Neurological:      General: No focal deficit present. Mental Status: He is alert.    Psychiatric:         Mood and Affect: Mood normal.         Behavior: Behavior normal.        Caren Peña MD

## 2022-12-06 NOTE — PATIENT INSTRUCTIONS
Use Dubrox twice weekly to prevent earwax build up    Speak with your cardiologist about an alternative to spironolactone. Please see the physical therapist for stretching for sciatica.

## 2022-12-07 LAB
ANION GAP SERPL CALC-SCNC: 3 MMOL/L (ref 5–15)
BASOPHILS # BLD: 0 K/UL (ref 0–0.1)
BASOPHILS NFR BLD: 0 % (ref 0–1)
BUN SERPL-MCNC: 35 MG/DL (ref 6–20)
BUN/CREAT SERPL: 24 (ref 12–20)
CALCIUM SERPL-MCNC: 8.7 MG/DL (ref 8.5–10.1)
CHLORIDE SERPL-SCNC: 107 MMOL/L (ref 97–108)
CO2 SERPL-SCNC: 29 MMOL/L (ref 21–32)
CREAT SERPL-MCNC: 1.44 MG/DL (ref 0.7–1.3)
DIFFERENTIAL METHOD BLD: ABNORMAL
EOSINOPHIL # BLD: 0 K/UL (ref 0–0.4)
EOSINOPHIL NFR BLD: 0 % (ref 0–7)
ERYTHROCYTE [DISTWIDTH] IN BLOOD BY AUTOMATED COUNT: 14.3 % (ref 11.5–14.5)
EST. AVERAGE GLUCOSE BLD GHB EST-MCNC: 111 MG/DL
GLUCOSE SERPL-MCNC: 104 MG/DL (ref 65–100)
HBA1C MFR BLD: 5.5 % (ref 4–5.6)
HCT VFR BLD AUTO: 34.5 % (ref 36.6–50.3)
HGB BLD-MCNC: 10.9 G/DL (ref 12.1–17)
IMM GRANULOCYTES # BLD AUTO: 0.1 K/UL (ref 0–0.04)
IMM GRANULOCYTES NFR BLD AUTO: 1 % (ref 0–0.5)
LYMPHOCYTES # BLD: 1.2 K/UL (ref 0.8–3.5)
LYMPHOCYTES NFR BLD: 13 % (ref 12–49)
MCH RBC QN AUTO: 30.8 PG (ref 26–34)
MCHC RBC AUTO-ENTMCNC: 31.6 G/DL (ref 30–36.5)
MCV RBC AUTO: 97.5 FL (ref 80–99)
MONOCYTES # BLD: 2.7 K/UL (ref 0–1)
MONOCYTES NFR BLD: 29 % (ref 5–13)
NEUTS SEG # BLD: 5.4 K/UL (ref 1.8–8)
NEUTS SEG NFR BLD: 57 % (ref 32–75)
NRBC # BLD: 0 K/UL (ref 0–0.01)
NRBC BLD-RTO: 0 PER 100 WBC
PLATELET # BLD AUTO: 153 K/UL (ref 150–400)
PMV BLD AUTO: 12.2 FL (ref 8.9–12.9)
POTASSIUM SERPL-SCNC: 4.9 MMOL/L (ref 3.5–5.1)
RBC # BLD AUTO: 3.54 M/UL (ref 4.1–5.7)
RBC MORPH BLD: ABNORMAL
SODIUM SERPL-SCNC: 139 MMOL/L (ref 136–145)
WBC # BLD AUTO: 9.4 K/UL (ref 4.1–11.1)

## 2022-12-07 NOTE — ASSESSMENT & PLAN NOTE
Cont finasteride. We discussed sx of BPH, he has frequency due to diuretic usage. He feels he is emptying his bladder completely most of the time.  If this changes, would start tamsulosin

## 2022-12-07 NOTE — ASSESSMENT & PLAN NOTE
Encouraged to discuss breast tenderness with cardiologist since this is a known side effect of spironolactone. BP low in clinic, better on home records. He denies dizziness.    I see that he is not on a statin, I will defer this to his cardiologist

## 2022-12-07 NOTE — ASSESSMENT & PLAN NOTE
We discussed if there is concern for progression he should go back to oncology. For now, I will keep an eye on his blood counts and renal function.

## 2022-12-08 NOTE — PROGRESS NOTES
Renal function slightly worse, not significantly different from variations hes had in the past.   Anemia slightly worse, had iron panel, B12, folate checked 5/2022 and it was all WNL. Will speak to him next visit about what else has been done, it would be reasonable to hold off on further eval in absence of sx (abnormal blood loss).

## 2022-12-13 ENCOUNTER — OFFICE VISIT (OUTPATIENT)
Dept: CARDIOLOGY CLINIC | Age: 87
End: 2022-12-13

## 2022-12-13 ENCOUNTER — ANCILLARY PROCEDURE (OUTPATIENT)
Dept: CARDIOLOGY CLINIC | Age: 87
End: 2022-12-13
Payer: MEDICARE

## 2022-12-13 VITALS
BODY MASS INDEX: 22.54 KG/M2 | HEIGHT: 71 IN | WEIGHT: 161 LBS | SYSTOLIC BLOOD PRESSURE: 90 MMHG | DIASTOLIC BLOOD PRESSURE: 50 MMHG

## 2022-12-13 VITALS
DIASTOLIC BLOOD PRESSURE: 42 MMHG | HEART RATE: 65 BPM | HEIGHT: 71 IN | SYSTOLIC BLOOD PRESSURE: 90 MMHG | RESPIRATION RATE: 16 BRPM | OXYGEN SATURATION: 100 % | WEIGHT: 157 LBS | BODY MASS INDEX: 21.98 KG/M2

## 2022-12-13 DIAGNOSIS — I42.0 DILATED CARDIOMYOPATHY (HCC): Primary | ICD-10-CM

## 2022-12-13 DIAGNOSIS — I34.0 NONRHEUMATIC MITRAL VALVE REGURGITATION: ICD-10-CM

## 2022-12-13 DIAGNOSIS — I27.20 PULMONARY HTN (HCC): ICD-10-CM

## 2022-12-13 DIAGNOSIS — I49.3 PVC (PREMATURE VENTRICULAR CONTRACTION): ICD-10-CM

## 2022-12-13 DIAGNOSIS — I44.7 LBBB (LEFT BUNDLE BRANCH BLOCK): ICD-10-CM

## 2022-12-13 DIAGNOSIS — R06.02 SOB (SHORTNESS OF BREATH): ICD-10-CM

## 2022-12-13 DIAGNOSIS — I50.22 CHRONIC SYSTOLIC HEART FAILURE (HCC): ICD-10-CM

## 2022-12-13 LAB
ECHO AO ASC DIAM: 3.4 CM
ECHO AO ASCENDING AORTA INDEX: 1.79 CM/M2
ECHO AO ROOT DIAM: 3.4 CM
ECHO AO ROOT INDEX: 1.79 CM/M2
ECHO LA DIAMETER INDEX: 2.53 CM/M2
ECHO LA DIAMETER: 4.8 CM
ECHO LA TO AORTIC ROOT RATIO: 1.41
ECHO LA VOL 2C: 85 ML (ref 18–58)
ECHO LA VOL 4C: 85 ML (ref 18–58)
ECHO LA VOL BP: 94 ML (ref 18–58)
ECHO LA VOL/BSA BIPLANE: 49 ML/M2 (ref 16–34)
ECHO LA VOLUME AREA LENGTH: 95 ML
ECHO LA VOLUME INDEX A2C: 45 ML/M2 (ref 16–34)
ECHO LA VOLUME INDEX A4C: 45 ML/M2 (ref 16–34)
ECHO LA VOLUME INDEX AREA LENGTH: 50 ML/M2 (ref 16–34)
ECHO LV EDV A2C: 181 ML
ECHO LV EDV A4C: 125 ML
ECHO LV EDV BP: 157 ML (ref 67–155)
ECHO LV EDV INDEX A4C: 66 ML/M2
ECHO LV EDV INDEX BP: 83 ML/M2
ECHO LV EDV NDEX A2C: 95 ML/M2
ECHO LV EJECTION FRACTION A2C: 25 %
ECHO LV EJECTION FRACTION A4C: 12 %
ECHO LV EJECTION FRACTION BIPLANE: 18 % (ref 55–100)
ECHO LV ESV A2C: 136 ML
ECHO LV ESV A4C: 110 ML
ECHO LV ESV BP: 129 ML (ref 22–58)
ECHO LV ESV INDEX A2C: 72 ML/M2
ECHO LV ESV INDEX A4C: 58 ML/M2
ECHO LV ESV INDEX BP: 68 ML/M2
ECHO LV FRACTIONAL SHORTENING: 12 % (ref 28–44)
ECHO LV INTERNAL DIMENSION DIASTOLE INDEX: 3.16 CM/M2
ECHO LV INTERNAL DIMENSION DIASTOLIC: 6 CM (ref 4.2–5.9)
ECHO LV INTERNAL DIMENSION SYSTOLIC INDEX: 2.79 CM/M2
ECHO LV INTERNAL DIMENSION SYSTOLIC: 5.3 CM
ECHO LV IVSD: 1.2 CM (ref 0.6–1)
ECHO LV MASS 2D: 331.8 G (ref 88–224)
ECHO LV MASS INDEX 2D: 174.7 G/M2 (ref 49–115)
ECHO LV POSTERIOR WALL DIASTOLIC: 1.3 CM (ref 0.6–1)
ECHO LV RELATIVE WALL THICKNESS RATIO: 0.43
ECHO RA AREA 4C: 17.8 CM2
ECHO RA END SYSTOLIC VOLUME APICAL 4 CHAMBER INDEX BSA: 27 ML/M2
ECHO RA VOLUME AREA LENGTH APICAL 4 CHAMBER: 54 ML
ECHO RA VOLUME: 51 ML
ECHO RV BASAL DIMENSION: 3.9 CM

## 2022-12-13 PROCEDURE — 1101F PT FALLS ASSESS-DOCD LE1/YR: CPT | Performed by: SPECIALIST

## 2022-12-13 PROCEDURE — 93308 TTE F-UP OR LMTD: CPT | Performed by: SPECIALIST

## 2022-12-13 PROCEDURE — 99213 OFFICE O/P EST LOW 20 MIN: CPT | Performed by: SPECIALIST

## 2022-12-13 PROCEDURE — G8427 DOCREV CUR MEDS BY ELIG CLIN: HCPCS | Performed by: SPECIALIST

## 2022-12-13 PROCEDURE — G8420 CALC BMI NORM PARAMETERS: HCPCS | Performed by: SPECIALIST

## 2022-12-13 PROCEDURE — G9717 DOC PT DX DEP/BP F/U NT REQ: HCPCS | Performed by: SPECIALIST

## 2022-12-13 PROCEDURE — G8536 NO DOC ELDER MAL SCRN: HCPCS | Performed by: SPECIALIST

## 2022-12-13 PROCEDURE — 1123F ACP DISCUSS/DSCN MKR DOCD: CPT | Performed by: SPECIALIST

## 2022-12-13 RX ORDER — ERGOCALCIFEROL 1.25 MG/1
CAPSULE ORAL
COMMUNITY

## 2022-12-13 RX ORDER — BUMETANIDE 1 MG/1
TABLET ORAL
Qty: 90 TABLET | Refills: 1 | Status: SHIPPED | OUTPATIENT
Start: 2022-12-13

## 2022-12-13 NOTE — PROGRESS NOTES
HISTORY OF PRESENT ILLNESS  Rosalia Scott is a 80 y.o. male     SUMMARY:   Problem List  Date Reviewed: 12/13/2022            Codes Class Noted    Pulmonary HTN ([facility] ICD-10-CM: I27.20  ICD-9-CM: 416.8  12/13/2022        Nonrheumatic mitral valve regurgitation ICD-10-CM: I34.0  ICD-9-CM: 424.0  12/13/2022        Dilated cardiomyopathy ([facility] ICD-10-CM: I42.0  ICD-9-CM: 425.4  12/13/2022        Sciatica of left side ICD-10-CM: M54.32  ICD-9-CM: 724.3  12/6/2022        Stage 3 chronic kidney disease, unspecified whether stage 3a or 3b CKD ([facility] ICD-10-CM: N18.30  ICD-9-CM: 585.3  12/6/2022        Chronic systolic heart failure ([facility] ICD-10-CM: G30.98  ICD-9-CM: 428.22  10/30/2022    Overview Signed 10/30/2022  4:14 PM by Emigdio Gonzales MD     Cardiology Dr Samantha Garg  Advanced HF @ [facility] Dr Frannie Roy  Advanced HF @ [facility] Dr Renetta Beltran - 10/2022   9/2022 EF 20-25%  GDMT limited by low BP - Dr Nadege Mcneil working to optimize - entresto 24-26 half tab, spironolactone 12.5. Possible CRT in the future.               SOB (shortness of breath) ICD-10-CM: R06.02  ICD-9-CM: 786.05  4/3/2022        MGUS (monoclonal gammopathy of unknown significance) ICD-10-CM: D47.2  ICD-9-CM: 273.1  4/10/2018    Overview Signed 12/6/2022  7:33 PM by MD Dr Suzanne May 2018, has not gone back             Diarrhea following gastrointestinal surgery ICD-10-CM: R19.7, Z98.890  ICD-9-CM: 564.4  11/1/2017        Cataract ICD-10-CM: H26.9  ICD-9-CM: 366.9  Unknown    Overview Signed 4/7/2016  2:24 PM by Fermin Clay     bilateral             Advance directive on file ICD-10-CM: Z78.9  ICD-9-CM: V49.89  4/7/2016        Recurrent major depressive disorder, in full remission ([facility] ICD-10-CM: F33.42  ICD-9-CM: 296.36  7/21/2014        Acquired autoimmune hypothyroidism ICD-10-CM: E06.3  ICD-9-CM: 244.8  4/14/2014        Proteinuria ICD-10-CM: R80.9  ICD-9-CM: 791.0  10/16/2013        History of prediabetes ICD-10-CM: H22.375  ICD-9-CM: V12.29  10/8/2013        LBBB (left bundle branch block) ICD-10-CM: I44.7  ICD-9-CM: 426.3  9/28/2012        Benign prostatic hyperplasia without lower urinary tract symptoms ICD-10-CM: N40.0  ICD-9-CM: 600.00  9/27/2012        PVC (premature ventricular contraction) ICD-10-CM: I49.3  ICD-9-CM: 427.69  8/24/2011        Personal history of colonic polyps ICD-10-CM: Z86.010  ICD-9-CM: V12.72  7/16/2009        Hyperlipidemia ICD-10-CM: E78.5  ICD-9-CM: 272.4  7/16/2009        Raynaud disease ICD-10-CM: I73.00  ICD-9-CM: 443.0  7/16/2009           Current Outpatient Medications on File Prior to Visit   Medication Sig    ergocalciferol (ERGOCALCIFEROL) 1,250 mcg (50,000 unit) capsule TAKE 1 CAPSULE BY MOUTH EVERY SEVEN DAYS Oral for Not Available    spironolactone (ALDACTONE) 25 mg tablet Take 12.5 mg by mouth daily. colestipoL (COLESTID) 1 gram tablet 1 PO every day    fluticasone propionate (FLONASE) 50 mcg/actuation nasal spray 2 Sprays by Both Nostrils route daily. Entresto 24-26 mg tablet TAKE ONE-HALF (1/2) TABLET TWICE A DAY    bumetanide (BUMEX) 2 mg tablet TAKE ONE-HALF (1/2) TABLET DAILY OR AS ADVISED BY PROVIDER    desonide (TRIDESILON) 0.05 % topical lotion PLEASE SEE ATTACHED FOR DETAILED DIRECTIONS    cholecalciferol, vitamin D3, 50 mcg (2,000 unit) tab Take  by mouth.    levothyroxine (SYNTHROID) 88 mcg tablet TAKE 1 TABLET DAILY (NEW DOSE)    escitalopram oxalate (LEXAPRO) 10 mg tablet TAKE 1 TABLET DAILY FOR ANXIOUSNESS ASSOCIATED WITH DEPRESSION    finasteride (Proscar) 5 mg tablet Take 1 Tablet by mouth daily (with dinner). Bifidobacterium Infantis 4 mg cap Take 1 Cap by mouth daily. acetaminophen (TYLENOL) 500 mg tablet Take  by mouth every six (6) hours as needed for Pain. No current facility-administered medications on file prior to visit. CARDIOLOGY STUDIES TO DATE:  7/11 lvh and mild mr, otherwise normal echo    8/12.  [employer] reversible inf defect vs attenuation    5/22 echo normal lv size with lvef 10-15%, mild rve with mod decreased rvef, mark, lae, mod to severe mr, mod tr with pa pressure 77mm    9/22 echo lvef 20-25%, mild to mod mr, lae, mild tr with pa pressure 42mm    Chief Complaint   Patient presents with    Follow-up     HPI :  Overall he continues to do remarkably well. He does have some symptomatic low blood pressure but no signs of volume overload. Mild dyspnea on exertion but in spite of that he still does a lot of physical activity around his house and home.   CARDIAC ROS:   negative for chest pain, palpitations, syncope, orthopnea, paroxysmal nocturnal dyspnea, exertional chest pressure/discomfort, claudication, lower extremity edema    Family History   Problem Relation Age of Onset    Stroke Father     Diabetes Brother     Kidney Disease Brother     Cancer Brother         lung    Cancer Mother         BREAST WITH METS TO LUNG    Heart Disease Mother     Lung Disease Sister     Cancer Sister         COLON    Cancer Sister         MELANOMA    Heart Attack Sister        Past Medical History:   Diagnosis Date    Arrhythmia     PAT, PVC'S LAST ABOUT A YEAR AGO    Arthritis     OSTEO    BPH 7/16/2009    Cancer ([facility] [address]     basal cell skin cancer, MELANOMA    Cataract     bilateral    Cataract     bilateral    Cellulitis 10/15/2018    left hand     Depression 2011    Diarrhea 7/16/2009    Hyperlipidemia 7/16/2009    Hypertension     NOT TREATED    Other ill-defined conditions(799.89)     bph    Other ill-defined conditions(799.89)     colon polyps    Other ill-defined conditions(799.89)     high cholesterol-PT DENIES    Other ill-defined conditions(799.89)     shingles    Other ill-defined conditions(799.89)     raynauds    Palpitations 7/16/2009    Personal history of colonic polyps 7/16/2009    Proteinuria     Psychiatric disorder     ANXIETY AND DEPRESSION    Raynaud disease 7/16/2009    Shingles 7/16/2009    Skin lesions 10/2018    lesions removed by DERM from face and ear     Unspecified sleep apnea     NO CPAP       GENERAL ROS:  A comprehensive review of systems was negative except for that written in the HPI.     Visit Vitals  BP (!) 90/42 (BP 1 Location: Left upper arm, BP Patient Position: Sitting, BP Cuff Size: Adult)   Pulse 65   Resp 16   Ht 5' 11\" (1.803 m)   Wt 157 lb (71.2 kg)   SpO2 100%   BMI 21.90 kg/m²       Wt Readings from Last 3 Encounters:   12/13/22 157 lb (71.2 kg)   12/13/22 161 lb (73 kg)   12/06/22 162 lb 6.4 oz (73.7 kg)            BP Readings from Last 3 Encounters:   12/13/22 (!) 90/42   12/13/22 (!) 90/50   12/06/22 (!) 90/50       PHYSICAL EXAM  General appearance: alert, cooperative, no distress, appears stated age  Neurologic: Alert and oriented X 3  Neck: supple, symmetrical, trachea midline, no adenopathy, no carotid bruit, and no JVD  Lungs: clear to auscultation bilaterally  Heart: regular rate and rhythm, S1, S2 normal, no murmur, click, rub or gallop  Extremities: extremities normal, atraumatic, no cyanosis or edema    Lab Results   Component Value Date/Time    Cholesterol, total 99 (L) 05/17/2022 11:12 AM    Cholesterol, total 131 11/29/2021 10:59 AM    Cholesterol, total 127 11/13/2020 09:36 AM    Cholesterol, total 138 11/13/2019 09:58 AM    Cholesterol, total 114 10/30/2018 09:11 AM    HDL Cholesterol 48 05/17/2022 11:12 AM    HDL Cholesterol 59 11/29/2021 10:59 AM    HDL Cholesterol 50 11/13/2020 09:36 AM    HDL Cholesterol 60 11/13/2019 09:58 AM    HDL Cholesterol 55 10/30/2018 09:11 AM    LDL, calculated 37 05/17/2022 11:12 AM    LDL, calculated 52.4 11/29/2021 10:59 AM    LDL, calculated 58.6 11/13/2020 09:36 AM    LDL, calculated 64 11/13/2019 09:58 AM    LDL, calculated 42 10/30/2018 09:11 AM    LDL, calculated 60 06/13/2017 01:42 PM    Triglyceride 62 05/17/2022 11:12 AM    Triglyceride 98 11/29/2021 10:59 AM    Triglyceride 92 11/13/2020 09:36 AM    Triglyceride 72 11/13/2019 09:58 AM Triglyceride 84 10/30/2018 09:11 AM    CHOL/HDL Ratio 2.2 11/29/2021 10:59 AM    CHOL/HDL Ratio 2.5 11/13/2020 09:36 AM    CHOL/HDL Ratio 2.8 09/23/2010 10:03 AM    CHOL/HDL Ratio 3.2 09/22/2009 10:15 AM     ASSESSMENT :      He had an echocardiogram today we will follow his results up by phone. In terms of his blood pressure I think if we are going to get rid of any of his meds that should be the Bumex. I forgot to mention that he saw a cardiologist affiliated with [facility] when they were up and Marybeth Mayen and was started on spironolactone which is causing gynecomastia so he is going to stop that. I would like him to stay on the Entresto but hold the Bumex and only started back if he gains 2 to 3 pounds in 2 to 3 days. current treatment plan is effective, no change in therapy  lab results and schedule of future lab studies reviewed with patient  reviewed diet, exercise and weight control    Encounter Diagnoses   Name Primary? Dilated cardiomyopathy (HCC) Yes    LBBB (left bundle branch block)     Nonrheumatic mitral valve regurgitation     Pulmonary HTN ([address]      Orders Placed This Encounter    ergocalciferol (ERGOCALCIFEROL) 1,250 mcg (50,000 unit) capsule       Follow-up and Dispositions    Return in about 3 months (around 3/13/2023). Enedina Pierre MD  12/13/2022  Please note that this dictation was completed with [employer], the computer voice recognition software. Quite often unanticipated grammatical, syntax, homophones, and other interpretive errors are inadvertently transcribed by the computer software. Please disregard these errors. Please excuse any errors that have escaped final proofreading. Thank you.

## 2022-12-14 ENCOUNTER — TELEPHONE (OUTPATIENT)
Dept: CARDIOLOGY CLINIC | Age: 87
End: 2022-12-14

## 2022-12-14 RX ORDER — FINASTERIDE 5 MG/1
5 TABLET, FILM COATED ORAL
Qty: 90 TABLET | Refills: 3 | Status: SHIPPED | OUTPATIENT
Start: 2022-12-14

## 2022-12-14 NOTE — TELEPHONE ENCOUNTER
Requested Prescriptions     Pending Prescriptions Disp Refills    finasteride (Proscar) 5 mg tablet 90 Tablet 3     Sig: Take 1 Tablet by mouth daily (with dinner).      [address] [facility]  [phone]

## 2022-12-14 NOTE — TELEPHONE ENCOUNTER
----- Message from Kristine Valadez MD sent at 12/14/2022  7:29 AM EST -----  Heart muscle is still weak, not siginficantly better or worse than before.  Stay on meds

## 2022-12-14 NOTE — TELEPHONE ENCOUNTER
Called pt. Spoke with Ms. Anthony Rosen. Verified patient's identity with two identifiers and HIPAA. Notified her of pt's results and Dr. Ignacia James message. She will notify pt and denied further questions or concerns.

## 2022-12-28 NOTE — PATIENT INSTRUCTIONS
Medicare Wellness Visit, Male The best way to live healthy is to have a lifestyle where you eat a well-balanced diet, exercise regularly, limit alcohol use, and quit all forms of tobacco/nicotine, if applicable. Regular preventive services are another way to keep healthy. Preventive services (vaccines, screening tests, monitoring & exams) can help personalize your care plan, which helps you manage your own care. Screening tests can find health problems at the earliest stages, when they are easiest to treat. [facility] follows the current, evidence-based guidelines published by the [facility] ([facility]) when recommending preventive services for our patients. Because we follow these guidelines, sometimes recommendations change over time as research supports it. (For example, a prostate screening blood test is no longer routinely recommended for men with no symptoms). Of course, you and your doctor may decide to screen more often for some diseases, based on your risk and co-morbidities (chronic disease you are already diagnosed with). Preventive services for you include: - Medicare offers their members a free annual wellness visit, which is time for you and your primary care provider to discuss and plan for your preventive service needs. Take advantage of this benefit every year! 
-All adults over age 72 should receive the recommended pneumonia vaccines. Current USPSTF guidelines recommend a series of two vaccines for the best pneumonia protection.  
-All adults should have a flu vaccine yearly and tetanus vaccine every 10 years. 
-All adults age 48 and older should receive the shingles vaccines (series of two vaccines).       
-All adults age 38-68 who are overweight should have a diabetes screening test once every three years.  
-Other screening tests & preventive services for persons with diabetes include: an eye exam to screen for diabetic retinopathy, a kidney function test, a foot exam, and stricter control over your cholesterol.  
-Cardiovascular screening for adults with routine risk involves an electrocardiogram (ECG) at intervals determined by the provider.  
-Colorectal cancer screening should be done for adults age 54-65 with no increased risk factors for colorectal cancer. There are a number of acceptable methods of screening for this type of cancer. Each test has its own benefits and drawbacks. Discuss with your provider what is most appropriate for you during your annual wellness visit. The different tests include: colonoscopy (considered the best screening method), a fecal occult blood test, a fecal DNA test, and sigmoidoscopy. 
-All adults born between [facility] should be screened once for Hepatitis C. 
-An Abdominal Aortic Aneurysm (AAA) Screening is recommended for men age 73-68 who has ever smoked in their lifetime. Here is a list of your current Health Maintenance items (your personalized list of preventive services) with a due date: 
Health Maintenance Due Topic Date Due  Yearly Flu Vaccine (1) 09/01/2020 [facility] Annual Well Visit  11/12/2020 Neck Strain: Care Instructions Your Care Instructions You have strained the muscles and ligaments in your neck. A sudden, awkward movement can strain the neck. This often occurs with falls or car accidents or during certain sports. Everyday activities like working on a computer or sleeping can also cause neck strain if they force you to hold your neck in an awkward position for a long time. It is common for neck pain to get worse for a day or two after an injury, but it should start to feel better after that. You may have more pain and stiffness for several days before it gets better. This is expected. It may take a few weeks or longer for it to heal completely.  Good home treatment can help you get better faster and avoid future neck problems. Follow-up care is a key part of your treatment and safety. Be sure to make and go to all appointments, and call your doctor if you are having problems. It's also a good idea to know your test results and keep a list of the medicines you take. How can you care for yourself at home? · If you were given a neck brace (cervical collar) to limit neck motion, wear it as instructed for as many days as your doctor tells you to. Do not wear it longer than you were told to. Wearing a brace for too long can make neck stiffness worse and weaken the neck muscles. · You can try using heat or ice to see if it helps. ? Try using a heating pad on a low or medium setting for 15 to 20 minutes every 2 to 3 hours. Try a warm shower in place of one session with the heating pad. You can also buy single-use heat wraps that last up to 8 hours. ? You can also try an ice pack for 10 to 15 minutes every 2 to 3 hours. · Take pain medicines exactly as directed. ? If the doctor gave you a prescription medicine for pain, take it as prescribed. ? If you are not taking a prescription pain medicine, ask your doctor if you can take an over-the-counter medicine. · Gently rub the area to relieve pain and help with blood flow. Do not massage the area if it hurts to do so. · Do not do anything that makes the pain worse. Take it easy for a couple of days. You can do your usual activities if they do not hurt your neck or put it at risk for more stress or injury. · Try sleeping on a special neck pillow. Place it under your neck, not under your head. Placing a tightly rolled-up towel under your neck while you sleep will also work. If you use a neck pillow or rolled towel, do not use your regular pillow at the same time. · To prevent future neck pain, do exercises to stretch and strengthen your neck and back.  Learn how to use good posture, safe lifting techniques, and proper body mechanics. When should you call for help? Call 911 anytime you think you may need emergency care. For example, call if: 
  · You are unable to move an arm or a leg at all. Call your doctor now or seek immediate medical care if: 
  · You have new or worse symptoms in your arms, legs, chest, belly, or buttocks. Symptoms may include: 
? Numbness or tingling. ? Weakness. ? Pain.  
  · You lose bladder or bowel control. Watch closely for changes in your health, and be sure to contact your doctor if: 
  · You are not getting better as expected. Where can you learn more? Go to http://www.gray.com/ Enter M253 in the search box to learn more about \"Neck Strain: Care Instructions. \" Current as of: March 2, 2020               Content Version: 12.6 © 7374-2556 [employer]. Care instructions adapted under license by [employer] (which disclaims liability or warranty for this information). If you have questions about a medical condition or this instruction, always ask your healthcare professional. [address] any warranty or liability for your use of this information. [Follow-Up: _____] : a [unfilled] follow-up visit [Other: _____] : [unfilled]

## 2023-01-26 ENCOUNTER — TELEPHONE (OUTPATIENT)
Dept: INTERNAL MEDICINE CLINIC | Age: 88
End: 2023-01-26

## 2023-01-26 DIAGNOSIS — Z98.890 DIARRHEA FOLLOWING GASTROINTESTINAL SURGERY: ICD-10-CM

## 2023-01-26 DIAGNOSIS — R19.7 DIARRHEA FOLLOWING GASTROINTESTINAL SURGERY: ICD-10-CM

## 2023-01-26 RX ORDER — MONTELUKAST SODIUM 4 MG/1
TABLET, CHEWABLE ORAL
Qty: 90 TABLET | Refills: 3 | Status: SHIPPED | OUTPATIENT
Start: 2023-01-26

## 2023-01-26 NOTE — TELEPHONE ENCOUNTER
Reason for call:    Patient's wife called. She said that [facility] told her that patient's prescription for Colestipol has been discontinued. He needs a prescription sent to [facility] for this.     Is this a new problem: yes     Date of last appointment:  12/6/2022     Can we respond via AudioSnaps: no    Best call back number:     Nori Valdivia - [phone]

## 2023-02-09 DIAGNOSIS — E06.3 ACQUIRED AUTOIMMUNE HYPOTHYROIDISM: ICD-10-CM

## 2023-02-09 NOTE — TELEPHONE ENCOUNTER
Requested Prescriptions     Pending Prescriptions Disp Refills    levothyroxine (SYNTHROID) 88 mcg tablet 30 Tablet 5     Sig: TAKE 1 TABLET DAILY (NEW DOSE)     [facility] - Kelly [place]ini, [address]  [phone]

## 2023-02-10 RX ORDER — LEVOTHYROXINE SODIUM 88 UG/1
TABLET ORAL
Qty: 90 TABLET | Refills: 1 | Status: SHIPPED | OUTPATIENT
Start: 2023-02-10

## 2023-03-07 ENCOUNTER — OFFICE VISIT (OUTPATIENT)
Dept: INTERNAL MEDICINE CLINIC | Age: 88
End: 2023-03-07
Payer: MEDICARE

## 2023-03-07 VITALS
BODY MASS INDEX: 23.41 KG/M2 | OXYGEN SATURATION: 98 % | RESPIRATION RATE: 16 BRPM | SYSTOLIC BLOOD PRESSURE: 109 MMHG | WEIGHT: 167.2 LBS | HEART RATE: 81 BPM | DIASTOLIC BLOOD PRESSURE: 65 MMHG | TEMPERATURE: 97.8 F | HEIGHT: 71 IN

## 2023-03-07 DIAGNOSIS — N18.30 STAGE 3 CHRONIC KIDNEY DISEASE, UNSPECIFIED WHETHER STAGE 3A OR 3B CKD (HCC): ICD-10-CM

## 2023-03-07 DIAGNOSIS — R06.09 DOE (DYSPNEA ON EXERTION): ICD-10-CM

## 2023-03-07 DIAGNOSIS — D47.2 MGUS (MONOCLONAL GAMMOPATHY OF UNKNOWN SIGNIFICANCE): ICD-10-CM

## 2023-03-07 DIAGNOSIS — E03.9 ACQUIRED HYPOTHYROIDISM: ICD-10-CM

## 2023-03-07 DIAGNOSIS — I50.22 CHRONIC SYSTOLIC HEART FAILURE (HCC): Primary | ICD-10-CM

## 2023-03-07 DIAGNOSIS — D64.9 ANEMIA, UNSPECIFIED TYPE: ICD-10-CM

## 2023-03-07 DIAGNOSIS — G47.34 NOCTURNAL HYPOXIA: ICD-10-CM

## 2023-03-07 PROCEDURE — G8536 NO DOC ELDER MAL SCRN: HCPCS | Performed by: STUDENT IN AN ORGANIZED HEALTH CARE EDUCATION/TRAINING PROGRAM

## 2023-03-07 PROCEDURE — G9717 DOC PT DX DEP/BP F/U NT REQ: HCPCS | Performed by: STUDENT IN AN ORGANIZED HEALTH CARE EDUCATION/TRAINING PROGRAM

## 2023-03-07 PROCEDURE — G8420 CALC BMI NORM PARAMETERS: HCPCS | Performed by: STUDENT IN AN ORGANIZED HEALTH CARE EDUCATION/TRAINING PROGRAM

## 2023-03-07 PROCEDURE — 99214 OFFICE O/P EST MOD 30 MIN: CPT | Performed by: STUDENT IN AN ORGANIZED HEALTH CARE EDUCATION/TRAINING PROGRAM

## 2023-03-07 PROCEDURE — G0463 HOSPITAL OUTPT CLINIC VISIT: HCPCS | Performed by: STUDENT IN AN ORGANIZED HEALTH CARE EDUCATION/TRAINING PROGRAM

## 2023-03-07 PROCEDURE — G8427 DOCREV CUR MEDS BY ELIG CLIN: HCPCS | Performed by: STUDENT IN AN ORGANIZED HEALTH CARE EDUCATION/TRAINING PROGRAM

## 2023-03-07 PROCEDURE — 1101F PT FALLS ASSESS-DOCD LE1/YR: CPT | Performed by: STUDENT IN AN ORGANIZED HEALTH CARE EDUCATION/TRAINING PROGRAM

## 2023-03-07 NOTE — PROGRESS NOTES
Deny Clarke is a 80y.o. year old male who presents today (03/07/23) for follow-up. Assessment & Plan:   1. Chronic systolic heart failure (HCC)  Assessment & Plan:   He may be carrying around a couple pounds of fluid based on his leg swelling. Home weight is stable but he is up several lb in office. I suggested he try to use his bumex 1 or 2 times a day for the next 3 day to remove some of the fluid. BP may limit use, wife will watch for this. He reports feeling very fatigued, I am not sure if this is related to his cardiac condition or something else  Has appointment with Dr Alena Calix next week. 2. PATE (dyspnea on exertion)  Comments:  Possibly due to HF or pHTN. I would like for him to see pulm but he refuses at this time. we discussed possibilty of O2 with ambulation and he is against carrying around any devices, even if they make him feel better. I asked him to reconsider this. 3. Anemia, unspecified type  Assessment & Plan:  Chronic. Iron studies 5/2022 were normal.  Will trend lab. If worse, may partially explain fatigue and/or PATE  Orders:  -     CBC W/O DIFF; Future  4. Nocturnal hypoxia  Assessment & Plan:  He has not undergone formal sleep study for WOLF. He is wearing 2L O2 at night after having nocturnal hypoxia on study 3/2022. I discussed with him that nocturnal O2 doesn't treat WOLF and that I am not certain his desaturations are resolved with the current therapy. I recommend pulmonary consultation, however he is very strongly against this. 5. MGUS (monoclonal gammopathy of unknown significance)  Assessment & Plan: Will monitor anemia, Cr for progression. He would to avoid specialist visit if possible   Orders:  -     METABOLIC PANEL, COMPREHENSIVE; Future  6. Acquired hypothyroidism  Assessment & Plan:  Check TSH today. Cont current dose of synthoid   Orders:  -     TSH 3RD GENERATION; Future  7.  Stage 3 chronic kidney disease, unspecified whether stage 3a or 3b CKD ([facility] [address]  Assessment & Plan:  Check renal function q6 mo        Health Maintenance - defer    RTC: 3 mo, medicare annual     Subjective:   Miguel Angel Colon was seen today for Hyperlipidemia and Hypothyroidism    Here today with Wife, Ruben Maki. HF  - Dr Lucy Arechiga, Dr Thao Allen  - he has PATE, which he initially states he feels like is getting worse and now feels just as bad as it was before he got his diagnosis. Later he states it is like it always has been. - feels weight at home is stable - 160lbs usually  - he has been sleeping on 2 pillows at night for the last year  - he does have leg edema at the end of the day that gets better in the AM.    He has used nocturnal O2 for the last year. [employer] plans to reach out for order renewal    Review of Systems   All other systems reviewed and are negative. PMHx    Patient Active Problem List   Diagnosis Code    Personal history of colonic polyps Z86.010    Hyperlipidemia E78.5    Raynaud disease I73.00    PVC (premature ventricular contraction) I49.3    Benign prostatic hyperplasia without lower urinary tract symptoms N40.0    LBBB (left bundle branch block) I44.7    History of prediabetes Z87.898    Proteinuria R80.9    Acquired hypothyroidism E03.9    Recurrent major depressive disorder, in full remission ([facility] [address] F33.42    Cataract H26.9    Diarrhea following gastrointestinal surgery R19.7, Z98.890    MGUS (monoclonal gammopathy of unknown significance) D47.2    Chronic systolic heart failure (HCC) I50.22    Sciatica of left side M54.32    Stage 3 chronic kidney disease, unspecified whether stage 3a or 3b CKD (HCC) N18.30    Pulmonary HTN (HCC) I27.20    Nonrheumatic mitral valve regurgitation I34.0    Dilated cardiomyopathy (HCC) I42.0    Anemia, unspecified type D64.9    Nocturnal hypoxia G47.34       Prior to Admission medications    Medication Sig Start Date End Date Taking?  Authorizing Provider   levothyroxine (SYNTHROID) 88 mcg tablet TAKE 1 TABLET DAILY 2/10/23  Yes Zondra Smoke, Laurel Luna MD   colestipoL (COLESTID) 1 gram tablet 1 PO every day 1/26/23  Yes Gena Dubon MD   finasteride (Proscar) 5 mg tablet Take 1 Tablet by mouth daily (with dinner). 12/14/22  Yes Gena Dubon MD   bumetanide (BUMEX) 1 mg tablet TAKE ONE TABLET DAILY OR AS ADVISED BY PROVIDER 12/13/22  Yes Airam Harrison MD   fluticasone propionate (FLONASE) 50 mcg/actuation nasal spray 2 Sprays by Both Nostrils route daily. 12/6/22  Yes Gena Dubon MD   Entresto 24-26 mg tablet TAKE ONE-HALF (1/2) TABLET TWICE A DAY 11/29/22  Yes Poncho Hobson III, MD   desonide (TRIDESILON) 0.05 % topical lotion Apply  to affected area as needed. 9/9/22  Yes Provider, Historical   cholecalciferol, vitamin D3, 50 mcg (2,000 unit) tab Take  by mouth. Yes Provider, Historical   escitalopram oxalate (LEXAPRO) 10 mg tablet TAKE 1 TABLET DAILY FOR ANXIOUSNESS ASSOCIATED WITH DEPRESSION 8/19/22  Yes Kevin May MD   Bifidobacterium Infantis 4 mg cap Take 1 Cap by mouth daily. Yes Provider, Historical   acetaminophen (TYLENOL) 500 mg tablet Take  by mouth every six (6) hours as needed for Pain. Yes Provider, Historical   ergocalciferol (ERGOCALCIFEROL) 1,250 mcg (50,000 unit) capsule TAKE 1 CAPSULE BY MOUTH EVERY SEVEN DAYS Oral for Not Available  3/7/23  Provider, Historical   spironolactone (ALDACTONE) 25 mg tablet Take 12.5 mg by mouth daily. 10/24/22 3/7/23  Provider, Historical       The following sections were reviewed & updated as appropriate: Problem List, Allergies, PMH, PSH, FH, and SH. Objective:   Visit Vitals  /65   Pulse 81   Temp 97.8 °F (36.6 °C) (Temporal)   Resp 16   Ht 5' 11\" (1.803 m)   Wt 167 lb 3.2 oz (75.8 kg)   SpO2 98%   BMI 23.32 kg/m²       Physical Exam  Constitutional:       General: He is not in acute distress. Eyes:      Extraocular Movements: Extraocular movements intact.       Conjunctiva/sclera: Conjunctivae normal.   Cardiovascular:      Rate and Rhythm: Normal rate and regular rhythm. Heart sounds: No murmur heard. Pulmonary:      Effort: Pulmonary effort is normal. No respiratory distress. Breath sounds: No rales. Abdominal:      General: Bowel sounds are normal.      Palpations: Abdomen is soft. Musculoskeletal:      Right lower leg: Edema (1+) present. Left lower leg: Edema (1+) present. Neurological:      General: No focal deficit present. Mental Status: He is alert.    Psychiatric:         Mood and Affect: Mood normal.         Behavior: Behavior normal.            Adelaida Prasad MD

## 2023-03-07 NOTE — PATIENT INSTRUCTIONS
Take 0.5mg bumex once per day. If blood pressure is not lower than normal, can take twice a day. Can do this for 3 days at most to see if shortness of breath improves. If you use bumex, please ask Dr Ramona Pritchard to check your electrolytes and kidney function next week.

## 2023-03-07 NOTE — Clinical Note
I saw Mr Abdi Gave today, he is reporting PATE. I could not ascertain if this is new or worse from baseline. He did have a bit of leg edema on exam, lung clear, and I recommended he use bumex 0.5mg once or twice a day as BP allows for the next 3 days to see if he has any improvement in his symptoms. I also encouraged him to see a pulmonologist for walk test - possibly desaturating with exertion. We don't have the ability to check for this in my office. He refuses. Perhaps if you agree with pulm eval you may ask him to reconsider for me.   Thanks Arely Batista MD

## 2023-03-08 DIAGNOSIS — D64.9 ANEMIA, UNSPECIFIED TYPE: Primary | ICD-10-CM

## 2023-03-08 LAB
ALBUMIN SERPL-MCNC: 3.5 G/DL (ref 3.5–5)
ALBUMIN/GLOB SERPL: 1 (ref 1.1–2.2)
ALP SERPL-CCNC: 50 U/L (ref 45–117)
ALT SERPL-CCNC: 15 U/L (ref 12–78)
ANION GAP SERPL CALC-SCNC: 3 MMOL/L (ref 5–15)
AST SERPL-CCNC: 8 U/L (ref 15–37)
BILIRUB SERPL-MCNC: 0.5 MG/DL (ref 0.2–1)
BUN SERPL-MCNC: 30 MG/DL (ref 6–20)
BUN/CREAT SERPL: 23 (ref 12–20)
CALCIUM SERPL-MCNC: 9 MG/DL (ref 8.5–10.1)
CHLORIDE SERPL-SCNC: 111 MMOL/L (ref 97–108)
CO2 SERPL-SCNC: 28 MMOL/L (ref 21–32)
CREAT SERPL-MCNC: 1.31 MG/DL (ref 0.7–1.3)
ERYTHROCYTE [DISTWIDTH] IN BLOOD BY AUTOMATED COUNT: 13.9 % (ref 11.5–14.5)
FERRITIN SERPL-MCNC: 30 NG/ML (ref 26–388)
GLOBULIN SER CALC-MCNC: 3.5 G/DL (ref 2–4)
GLUCOSE SERPL-MCNC: 103 MG/DL (ref 65–100)
HCT VFR BLD AUTO: 31.9 % (ref 36.6–50.3)
HGB BLD-MCNC: 9.9 G/DL (ref 12.1–17)
IRON SATN MFR SERPL: 12 % (ref 20–50)
IRON SERPL-MCNC: 43 UG/DL (ref 35–150)
MCH RBC QN AUTO: 30.2 PG (ref 26–34)
MCHC RBC AUTO-ENTMCNC: 31 G/DL (ref 30–36.5)
MCV RBC AUTO: 97.3 FL (ref 80–99)
NRBC # BLD: 0 K/UL (ref 0–0.01)
NRBC BLD-RTO: 0 PER 100 WBC
PLATELET # BLD AUTO: 113 K/UL (ref 150–400)
PMV BLD AUTO: 12.3 FL (ref 8.9–12.9)
POTASSIUM SERPL-SCNC: 5.1 MMOL/L (ref 3.5–5.1)
PROT SERPL-MCNC: 7 G/DL (ref 6.4–8.2)
RBC # BLD AUTO: 3.28 M/UL (ref 4.1–5.7)
SODIUM SERPL-SCNC: 142 MMOL/L (ref 136–145)
TIBC SERPL-MCNC: 363 UG/DL (ref 250–450)
TSH SERPL DL<=0.05 MIU/L-ACNC: 3.98 UIU/ML (ref 0.36–3.74)
WBC # BLD AUTO: 7.1 K/UL (ref 4.1–11.1)

## 2023-03-08 NOTE — ASSESSMENT & PLAN NOTE
He may be carrying around a couple pounds of fluid based on his leg swelling. Home weight is stable but he is up several lb in office. I suggested he try to use his bumex 1 or 2 times a day for the next 3 day to remove some of the fluid. BP may limit use, wife will watch for this. He reports feeling very fatigued, I am not sure if this is related to his cardiac condition or something else  Has appointment with Dr Welby Landau next week.

## 2023-03-08 NOTE — ASSESSMENT & PLAN NOTE
He has not undergone formal sleep study for WOLF. He is wearing 2L O2 at night after having nocturnal hypoxia on study 3/2022. I discussed with him that nocturnal O2 doesn't treat WOLF and that I am not certain his desaturations are resolved with the current therapy. I recommend pulmonary consultation, however he is very strongly against this.

## 2023-03-08 NOTE — ASSESSMENT & PLAN NOTE
Chronic. Iron studies 5/2022 were normal.  Will trend lab.  If worse, may partially explain fatigue and/or PATE

## 2023-03-08 NOTE — PROGRESS NOTES
Faxed add on request to [facility] [phone] for Iron Profile and Ferritin - Dx code D64.9. Confirmation received.

## 2023-03-09 ENCOUNTER — PATIENT MESSAGE (OUTPATIENT)
Dept: INTERNAL MEDICINE CLINIC | Age: 88
End: 2023-03-09

## 2023-03-09 DIAGNOSIS — R06.09 DOE (DYSPNEA ON EXERTION): ICD-10-CM

## 2023-03-09 DIAGNOSIS — D47.2 MGUS (MONOCLONAL GAMMOPATHY OF UNKNOWN SIGNIFICANCE): Primary | ICD-10-CM

## 2023-03-09 DIAGNOSIS — D64.9 ANEMIA, UNSPECIFIED TYPE: ICD-10-CM

## 2023-03-09 DIAGNOSIS — G47.34 NOCTURNAL HYPOXIA: ICD-10-CM

## 2023-03-09 NOTE — PROGRESS NOTES
Please call - lab shows worse anemia. Hemoglobin down from 10.9 3 months ago to 9.9. He has a mild iron deficiency. Any dark stools or blood in the stool? I would like for him to start taking an iron tablet - ferrous sulfate 325mg every other day or three days a week - whatever is easiest to remember. Side effects is constipation, he should use miralax on the days he takes the iron tab if he experiences constipation. Iron tablets also cause the stool to be darker in color. I know he is very adamant about not seeing any more specialist, but I would recommend hematology evaluation. He has seen Dr Rj Del Valle in the past.     Has he given any more thought to a pulmonology referral? I have located his home oxygen testing record. His labs show mild low thyroid, I don't think it's worth making a change to the medication because excess thyroid medication can stress the heart. We will follow the TSH thyroid lab next visit to see how it trends. Kidney function and electrolytes are stable.     Edith Moon MD

## 2023-03-10 ENCOUNTER — TELEPHONE (OUTPATIENT)
Dept: INTERNAL MEDICINE CLINIC | Age: 88
End: 2023-03-10

## 2023-03-10 NOTE — TELEPHONE ENCOUNTER
Faxed pt's oxygen order with office note from 3/7/23 to [facility] [phone]. Confirmation received. Form sent to stat scan.

## 2023-03-14 ENCOUNTER — OFFICE VISIT (OUTPATIENT)
Dept: CARDIOLOGY CLINIC | Age: 88
End: 2023-03-14
Payer: MEDICARE

## 2023-03-14 VITALS
RESPIRATION RATE: 20 BRPM | DIASTOLIC BLOOD PRESSURE: 68 MMHG | HEIGHT: 70 IN | OXYGEN SATURATION: 96 % | SYSTOLIC BLOOD PRESSURE: 98 MMHG | HEART RATE: 94 BPM | WEIGHT: 170 LBS | BODY MASS INDEX: 24.34 KG/M2

## 2023-03-14 DIAGNOSIS — I44.7 LBBB (LEFT BUNDLE BRANCH BLOCK): ICD-10-CM

## 2023-03-14 DIAGNOSIS — I42.0 DILATED CARDIOMYOPATHY (HCC): Primary | ICD-10-CM

## 2023-03-14 DIAGNOSIS — I34.0 NONRHEUMATIC MITRAL VALVE REGURGITATION: ICD-10-CM

## 2023-03-14 PROCEDURE — G8420 CALC BMI NORM PARAMETERS: HCPCS | Performed by: SPECIALIST

## 2023-03-14 PROCEDURE — 1123F ACP DISCUSS/DSCN MKR DOCD: CPT | Performed by: SPECIALIST

## 2023-03-14 PROCEDURE — G8536 NO DOC ELDER MAL SCRN: HCPCS | Performed by: SPECIALIST

## 2023-03-14 PROCEDURE — 1101F PT FALLS ASSESS-DOCD LE1/YR: CPT | Performed by: SPECIALIST

## 2023-03-14 PROCEDURE — G0463 HOSPITAL OUTPT CLINIC VISIT: HCPCS | Performed by: SPECIALIST

## 2023-03-14 PROCEDURE — 99213 OFFICE O/P EST LOW 20 MIN: CPT | Performed by: SPECIALIST

## 2023-03-14 PROCEDURE — G8427 DOCREV CUR MEDS BY ELIG CLIN: HCPCS | Performed by: SPECIALIST

## 2023-03-14 PROCEDURE — G9717 DOC PT DX DEP/BP F/U NT REQ: HCPCS | Performed by: SPECIALIST

## 2023-03-14 NOTE — PROGRESS NOTES
HISTORY OF PRESENT ILLNESS  Roz Hope is a 80 y.o. male     SUMMARY:   Problem List  Date Reviewed: 3/7/2023            Codes Class Noted    Anemia, unspecified type ICD-10-CM: D64.9  ICD-9-CM: 285.9  3/7/2023    Overview Addendum 3/7/2023  7:19 PM by Arlette Blackwell MD     ? Due to MGUS  Iron 5/2022             Nocturnal hypoxia ICD-10-CM: G47.34  ICD-9-CM: 327.24  3/7/2023    Overview Addendum 3/7/2023  7:23 PM by Arlette Blackwell MD     Nocturnal oulse ox study 3/2022. Pulmonary HTN ([facility] ICD-10-CM: I27.20  ICD-9-CM: 416.8  12/13/2022        Nonrheumatic mitral valve regurgitation ICD-10-CM: I34.0  ICD-9-CM: 424.0  12/13/2022        Dilated cardiomyopathy ([facility] ICD-10-CM: I42.0  ICD-9-CM: 425.4  12/13/2022        Sciatica of left side ICD-10-CM: M54.32  ICD-9-CM: 724.3  12/6/2022        Stage 3 chronic kidney disease, unspecified whether stage 3a or 3b CKD ([facility] ICD-10-CM: N18.30  ICD-9-CM: 585.3  12/6/2022        Chronic systolic heart failure ([facility] ICD-10-CM: P75.16  ICD-9-CM: 428.22  10/30/2022    Overview Addendum 3/7/2023  7:11 PM by Arlette Blackwell MD     Cardiology Dr Kingsley  Advanced HF @ [facility] Dr Connie Kay  Advanced HF @ [facility] Dr Miles Darling - 10/2022   9/2022 EF 20-25%  GDMT limited by low BP - Dr Ron Michael working to optimize - entresto 24-26 half tab. Possible CRT in the future.               MGUS (monoclonal gammopathy of unknown significance) ICD-10-CM: D47.2  ICD-9-CM: 273.1  4/10/2018    Overview Signed 12/6/2022  7:33 PM by MD Dr Theresa Melissa 2018, has not gone back             Diarrhea following gastrointestinal surgery ICD-10-CM: R19.7, Z98.890  ICD-9-CM: 564.4  11/1/2017        Cataract ICD-10-CM: H26.9  ICD-9-CM: 366.9  Unknown    Overview Signed 4/7/2016  2:24 PM by Layo Miranda     bilateral             Recurrent major depressive disorder, in full remission ([facility] ICD-10-CM: F33.42  ICD-9-CM: 296.36 7/21/2014        Acquired hypothyroidism ICD-10-CM: E03.9  ICD-9-CM: 244.9  4/14/2014        Proteinuria ICD-10-CM: R80.9  ICD-9-CM: 791.0  10/16/2013        History of prediabetes ICD-10-CM: Z87.898  ICD-9-CM: V12.29  10/8/2013        LBBB (left bundle branch block) ICD-10-CM: I44.7  ICD-9-CM: 426.3  9/28/2012        Benign prostatic hyperplasia without lower urinary tract symptoms ICD-10-CM: N40.0  ICD-9-CM: 600.00  9/27/2012        PVC (premature ventricular contraction) ICD-10-CM: I49.3  ICD-9-CM: 427.69  8/24/2011        Personal history of colonic polyps ICD-10-CM: Z86.010  ICD-9-CM: V12.72  7/16/2009        Hyperlipidemia ICD-10-CM: E78.5  ICD-9-CM: 272.4  7/16/2009        Raynaud disease ICD-10-CM: I73.00  ICD-9-CM: 443.0  7/16/2009           Current Outpatient Medications on File Prior to Visit   Medication Sig    Ferrous Fumarate 325 mg (106 mg iron) tab Take  by mouth. Takes 3 days a week    levothyroxine (SYNTHROID) 88 mcg tablet TAKE 1 TABLET DAILY    colestipoL (COLESTID) 1 gram tablet 1 PO every day    finasteride (Proscar) 5 mg tablet Take 1 Tablet by mouth daily (with dinner). bumetanide (BUMEX) 1 mg tablet TAKE ONE TABLET DAILY OR AS ADVISED BY PROVIDER    fluticasone propionate (FLONASE) 50 mcg/actuation nasal spray 2 Sprays by Both Nostrils route daily. Entresto 24-26 mg tablet TAKE ONE-HALF (1/2) TABLET TWICE A DAY    desonide (TRIDESILON) 0.05 % topical lotion Apply  to affected area as needed. cholecalciferol, vitamin D3, 50 mcg (2,000 unit) tab Take  by mouth.    escitalopram oxalate (LEXAPRO) 10 mg tablet TAKE 1 TABLET DAILY FOR ANXIOUSNESS ASSOCIATED WITH DEPRESSION    Bifidobacterium Infantis 4 mg cap Take 1 Cap by mouth daily. acetaminophen (TYLENOL) 500 mg tablet Take  by mouth every six (6) hours as needed for Pain. No current facility-administered medications on file prior to visit. CARDIOLOGY STUDIES TO DATE:  7/11 lvh and mild mr, otherwise normal echo    8/12. Lexiscan reversible inf defect vs attenuation    5/22 echo normal lv size with lvef 10-15%, mild rve with mod decreased rvef, mark, lae, mod to severe mr, mod tr with pa pressure 77mm    9/22 echo lvef 20-25%, mild to mod mr, lae, mild tr with pa pressure 42mm    Chief Complaint   Patient presents with    Follow-up     HPI :  Things are pretty much the same with him. A lot of fatigue some shortness of breath and definitely some anxiety and depression about things. Something came up where there is a question or whether it should be continued on home oxygen. His previous PCP did overnight oximetry about a year ago and he qualified. His current PCP wants him to see a pulmonologist and this not a bad idea. He cannot tolerate any more medication because low blood pressure.   He only takes Bumex half a tab maybe once a week but that being said he does not drink a lot of water  CARDIAC ROS:   negative for chest pain, palpitations, syncope, orthopnea, paroxysmal nocturnal dyspnea, exertional chest pressure/discomfort, claudication    Family History   Problem Relation Age of Onset    Stroke Father     Diabetes Brother     Kidney Disease Brother     Cancer Brother         lung    Cancer Mother         BREAST WITH METS TO LUNG    Heart Disease Mother     Lung Disease Sister     Cancer Sister         COLON    Cancer Sister         MELANOMA    Heart Attack Sister        Past Medical History:   Diagnosis Date    Arrhythmia     PAT, PVC'S LAST ABOUT A YEAR AGO    Arthritis     OSTEO    BPH 7/16/2009    Cancer ([facility]     basal cell skin cancer, MELANOMA    Cataract     bilateral    Cataract     bilateral    Cellulitis 10/15/2018    left hand     Depression 2011    Diarrhea 7/16/2009    Hyperlipidemia 7/16/2009    Hypertension     NOT TREATED    Other ill-defined conditions(799.89)     bph    Other ill-defined conditions(799.89)     colon polyps    Other ill-defined conditions(799.89)     high cholesterol-PT DENIES    Other ill-defined conditions(799.89)     shingles    Other ill-defined conditions(799.89)     raynauds    Palpitations 7/16/2009    Personal history of colonic polyps 7/16/2009    Proteinuria     Psychiatric disorder     ANXIETY AND DEPRESSION    Raynaud disease 7/16/2009    Shingles 7/16/2009    Skin lesions 10/2018    lesions removed by DERM from face and ear     Unspecified sleep apnea     NO CPAP       GENERAL ROS:  A comprehensive review of systems was negative except for that written in the HPI.     Visit Vitals  BP 98/68 (BP 1 Location: Left upper arm, BP Patient Position: Sitting, BP Cuff Size: Adult)   Pulse 94   Resp 20   Ht 5' 9.5\" (1.765 m)   Wt 170 lb (77.1 kg)   SpO2 96%   BMI 24.74 kg/m²       Wt Readings from Last 3 Encounters:   03/14/23 170 lb (77.1 kg)   03/07/23 167 lb 3.2 oz (75.8 kg)   12/13/22 157 lb (71.2 kg)            BP Readings from Last 3 Encounters:   03/14/23 98/68   03/07/23 109/65   12/13/22 (!) 90/42       PHYSICAL EXAM  General appearance: alert, cooperative, no distress, appears stated age  Neurologic: Alert and oriented X 3  Neck: supple, symmetrical, trachea midline, no adenopathy, no carotid bruit, and no JVD  Lungs: clear to auscultation bilaterally  Heart: regular rate and rhythm, S1, S2 normal, no murmur, click, rub or gallop  Extremities: extremities normal, atraumatic, no cyanosis or edema    Lab Results   Component Value Date/Time    Cholesterol, total 99 (L) 05/17/2022 11:12 AM    Cholesterol, total 131 11/29/2021 10:59 AM    Cholesterol, total 127 11/13/2020 09:36 AM    Cholesterol, total 138 11/13/2019 09:58 AM    Cholesterol, total 114 10/30/2018 09:11 AM    HDL Cholesterol 48 05/17/2022 11:12 AM    HDL Cholesterol 59 11/29/2021 10:59 AM    HDL Cholesterol 50 11/13/2020 09:36 AM    HDL Cholesterol 60 11/13/2019 09:58 AM    HDL Cholesterol 55 10/30/2018 09:11 AM    LDL, calculated 37 05/17/2022 11:12 AM    LDL, calculated 52.4 11/29/2021 10:59 AM    LDL, calculated 58.6 11/13/2020 09:36 AM    LDL, calculated 64 11/13/2019 09:58 AM    LDL, calculated 42 10/30/2018 09:11 AM    LDL, calculated 60 06/13/2017 01:42 PM    Triglyceride 62 05/17/2022 11:12 AM    Triglyceride 98 11/29/2021 10:59 AM    Triglyceride 92 11/13/2020 09:36 AM    Triglyceride 72 11/13/2019 09:58 AM    Triglyceride 84 10/30/2018 09:11 AM    CHOL/HDL Ratio 2.2 11/29/2021 10:59 AM    CHOL/HDL Ratio 2.5 11/13/2020 09:36 AM    CHOL/HDL Ratio 2.8 09/23/2010 10:03 AM    CHOL/HDL Ratio 3.2 09/22/2009 10:15 AM     ASSESSMENT :      He is relatively stable but just borderline compensated. It seems to me it is more of a low output situation than a volume overload. Issues come up in the past about a biventricular device for him and I feel like it is age and general frailty he is very thin I be worried about device erosion and healing and all that sort of thing plus I do not think it is really likely to improve him in any significant way. He needs no cardiac testing at this time. current treatment plan is effective, no change in therapy  lab results and schedule of future lab studies reviewed with patient  reviewed diet, exercise and weight control    Encounter Diagnoses   Name Primary? Dilated cardiomyopathy (HCC) Yes    LBBB (left bundle branch block)     Nonrheumatic mitral valve regurgitation      Orders Placed This Encounter    Ferrous Fumarate 325 mg (106 mg iron) tab       Follow-up and Dispositions    Return in about 4 months (around 7/14/2023). Neida Poe MD  3/14/2023  Please note that this dictation was completed with [employer] video, the [employer] voice recognition software. Quite often unanticipated grammatical, syntax, homophones, and other interpretive errors are inadvertently transcribed by the computer software. Please disregard these errors. Please excuse any errors that have escaped final proofreading. Thank you.

## 2023-03-20 DIAGNOSIS — F41.1 ANXIETY STATE: ICD-10-CM

## 2023-03-20 RX ORDER — ESCITALOPRAM OXALATE 10 MG/1
10 TABLET ORAL DAILY
Qty: 90 TABLET | Refills: 3 | Status: SHIPPED | OUTPATIENT
Start: 2023-03-20

## 2023-03-20 NOTE — TELEPHONE ENCOUNTER
Requested Prescriptions     Pending Prescriptions Disp Refills    escitalopram oxalate (LEXAPRO) 10 mg tablet 90 Tablet 1     [address], [address] [facility]  [phone]

## 2023-03-27 ENCOUNTER — TELEPHONE (OUTPATIENT)
Dept: INTERNAL MEDICINE CLINIC | Age: 88
End: 2023-03-27

## 2023-04-23 PROBLEM — R06.09 DOE (DYSPNEA ON EXERTION): Status: ACTIVE | Noted: 2023-04-23

## 2023-05-01 ENCOUNTER — OFFICE VISIT (OUTPATIENT)
Dept: ONCOLOGY | Age: 88
End: 2023-05-01
Payer: MEDICARE

## 2023-05-01 VITALS
HEART RATE: 84 BPM | TEMPERATURE: 97.4 F | DIASTOLIC BLOOD PRESSURE: 80 MMHG | WEIGHT: 164 LBS | OXYGEN SATURATION: 93 % | RESPIRATION RATE: 16 BRPM | BODY MASS INDEX: 23.87 KG/M2 | SYSTOLIC BLOOD PRESSURE: 123 MMHG

## 2023-05-01 DIAGNOSIS — N18.30 STAGE 3 CHRONIC KIDNEY DISEASE, UNSPECIFIED WHETHER STAGE 3A OR 3B CKD (HCC): ICD-10-CM

## 2023-05-01 DIAGNOSIS — D50.9 IRON DEFICIENCY ANEMIA, UNSPECIFIED IRON DEFICIENCY ANEMIA TYPE: ICD-10-CM

## 2023-05-01 DIAGNOSIS — D47.2 MGUS (MONOCLONAL GAMMOPATHY OF UNKNOWN SIGNIFICANCE): Primary | ICD-10-CM

## 2023-05-01 PROCEDURE — 1101F PT FALLS ASSESS-DOCD LE1/YR: CPT | Performed by: INTERNAL MEDICINE

## 2023-05-01 PROCEDURE — G8536 NO DOC ELDER MAL SCRN: HCPCS | Performed by: INTERNAL MEDICINE

## 2023-05-01 PROCEDURE — G8427 DOCREV CUR MEDS BY ELIG CLIN: HCPCS | Performed by: INTERNAL MEDICINE

## 2023-05-01 PROCEDURE — G8420 CALC BMI NORM PARAMETERS: HCPCS | Performed by: INTERNAL MEDICINE

## 2023-05-01 PROCEDURE — G0463 HOSPITAL OUTPT CLINIC VISIT: HCPCS | Performed by: INTERNAL MEDICINE

## 2023-05-01 PROCEDURE — 99204 OFFICE O/P NEW MOD 45 MIN: CPT | Performed by: INTERNAL MEDICINE

## 2023-05-01 PROCEDURE — G9717 DOC PT DX DEP/BP F/U NT REQ: HCPCS | Performed by: INTERNAL MEDICINE

## 2023-05-01 PROCEDURE — 1123F ACP DISCUSS/DSCN MKR DOCD: CPT | Performed by: INTERNAL MEDICINE

## 2023-05-01 NOTE — PROGRESS NOTES
[facility] [place] at [address]  W: [phone]  F: [phone]    Reason for Visit:   Marielle Tobias is a 80 y.o. male who is seen in consultation at the request of Dr. Lorenzo De La Cruz for evaluation of MGUS. History of Present Illness:   Marielle Tobias is a pleasant 80 y.o. male who presents today for evaluation of MGUS. Patient previously followed with my partner Dr. Lauren Almonte after was noted to have MGUS in ~2016. Per her office note, work up 2/6/17 showed an improved Hb of 12.2 g/dl, worsening Cr of 1.23, normal Ca, confirmed a 0.6 g/dl M spike typed as an IgG with lambda light chain specificity, a normal FLC ratio, normal skeletal survey, slightly elevated IgM at 152. Last seen 4/10/2018. Labs at that time with IgG MGUS, 0.6 g/DL, normal FLC ratio, and no defining end organ damage. Anemia not felt to meet criteria for myeloma. Recommendation was for repeat testing annually. Patient declined specialist referral.      More recently, patient seen by PCP 3/7/2023 and found to have progressively worsening anemia. Hemoglobin declined from 11.6 (5/2022) --> 10.9 (12/2022) --> 9.9 (3/7/23). Work up demonstrated mild iron deficiency anemia (ferritin 30, iron sat 12%) and PCP started on ferrous sulfate 325 mg 3x per week. Tolerating well with no constipation, GI upset, nausea. Has noticed his stool is darker. Denies bone pain, fevers, chills, recent infections (had episode of pneumonia 15 months ago, treated with outpatient antibiotics) frothy urine, changes in urination. He complains of being cold all the time. Last colonoscopy years ago. Discharged from surveillance given age. No blood in stool. Social History:  Presents to clinic with his wife.  for 51 years. Two children, 4 grandchildren, no grandchildren. Retired from [employer]. Former smoker, remote 70 years ago. No EtOh use.   Live in [facility] (outside work covered by condo service). Does not eat red meat. Review of systems was obtained and pertinent findings reviewed above. Past medical history, social history, family history, medications, and allergies are located in the electronic medical record. Physical Exam:   Visit Vitals  /80 (BP 1 Location: Right arm, BP Patient Position: Sitting)   Pulse 84   Temp 97.4 °F (36.3 °C)   Resp 16   Wt 164 lb (74.4 kg)   SpO2 93%   BMI 23.87 kg/m²     General: no distress, pleasant elderly gentleman  Eyes: anicteric sclerae  Neck: supple  Lymphatic: no cervical, supraclavicular adenopathy  Respiratory: normal respiratory effort  CV: no peripheral edema  Ext: warm, well perfused  GI: soft, nontender, nondistended, no masses or organomegaly  Skin: no rashes, no ecchymoses, no petechiae    Results:     Lab Results   Component Value Date/Time    WBC 7.1 03/07/2023 03:08 PM    HGB 9.9 (L) 03/07/2023 03:08 PM    HCT 31.9 (L) 03/07/2023 03:08 PM    PLATELET 789 (L) 59/20/0484 03:08 PM    MCV 97.3 03/07/2023 03:08 PM    ABS. NEUTROPHILS 5.4 12/06/2022 03:52 PM     Lab Results   Component Value Date/Time    Sodium 142 03/07/2023 03:08 PM    Potassium 5.1 03/07/2023 03:08 PM    Chloride 111 (H) 03/07/2023 03:08 PM    CO2 28 03/07/2023 03:08 PM    Glucose 103 (H) 03/07/2023 03:08 PM    BUN 30 (H) 03/07/2023 03:08 PM    Creatinine 1.31 (H) 03/07/2023 03:08 PM    GFR est AA >60 05/23/2022 04:22 PM    GFR est non-AA 56 (L) 05/23/2022 04:22 PM    Calcium 9.0 03/07/2023 03:08 PM     Lab Results   Component Value Date/Time    Bilirubin, total 0.5 03/07/2023 03:08 PM    ALT (SGPT) 15 03/07/2023 03:08 PM    Alk. phosphatase 50 03/07/2023 03:08 PM    Protein, total 7.0 03/07/2023 03:08 PM    Albumin 3.5 03/07/2023 03:08 PM    Globulin 3.5 03/07/2023 03:08 PM     Records from [facility] reviewed and summarized above. Test results above have been reviewed. Assessment:     #) IgG MGUS: previously followed by my partner Dr. Chase Collazo until 2018.   At that time, M-spike 0.6, normal K:L ratio, CHERYL with IgG lambda monoclonal protein. Was being monitored annually but wished to minimize specialists visits. Of note, he does have anemia, but this is alternatively explained by iron deficiency. Has stable CKD, normal calcium, and no paraproteinemia. Will repeat gammopathy testing today. #) Iron deficiency anemia: iron sat 12%, ferritin 30. Started on ferrous fumarate 3x by his PCP. Tolerating well with no GI upset. Etiology of iron deficiency unclear. Denies melena/hematochezia. Possibly related to poor iron intake. Will repeat labs and iron studies now to evaluate for appropriate response. Will hold off on GI evaluation given age/medical comorbidities     #) CKD: Stable. Baseline creatinine ~1.3. Plan:     CBC with diff, ferritin, iron profile, retic count, smear, gammopathy eval, B12 level - we will call with results  Patient wishes to minimize medical appointments/interventions  Follow up pending results of lab.       Signed By: Miguelito Gibson MD

## 2023-05-01 NOTE — PROGRESS NOTES
Janae Vallejo is a 80 y.o. male    Chief Complaint   Patient presents with    New Patient       1. Have you been to the ER, urgent care clinic since your last visit? Hospitalized since your last visit? No    2. Have you seen or consulted any other health care providers outside of the [address] [place] [address] since your last visit? Include any pap smears or colon screening. Yes, Cardiologist in [place]

## 2023-05-05 ENCOUNTER — TELEPHONE (OUTPATIENT)
Dept: ONCOLOGY | Age: 88
End: 2023-05-05

## 2023-05-11 RX ORDER — FLUTICASONE PROPIONATE 50 MCG
SPRAY, SUSPENSION (ML) NASAL
Qty: 16 G | Refills: 11 | Status: SHIPPED | OUTPATIENT
Start: 2023-05-11

## 2023-05-15 ENCOUNTER — TELEPHONE (OUTPATIENT)
Age: 88
End: 2023-05-15

## 2023-06-08 ENCOUNTER — OFFICE VISIT (OUTPATIENT)
Age: 88
End: 2023-06-08
Payer: MEDICARE

## 2023-06-08 VITALS
BODY MASS INDEX: 23.56 KG/M2 | OXYGEN SATURATION: 97 % | HEIGHT: 70 IN | SYSTOLIC BLOOD PRESSURE: 100 MMHG | WEIGHT: 164.6 LBS | DIASTOLIC BLOOD PRESSURE: 64 MMHG | HEART RATE: 100 BPM

## 2023-06-08 VITALS
SYSTOLIC BLOOD PRESSURE: 101 MMHG | BODY MASS INDEX: 23.71 KG/M2 | TEMPERATURE: 98.2 F | WEIGHT: 165.6 LBS | DIASTOLIC BLOOD PRESSURE: 58 MMHG | HEART RATE: 61 BPM | RESPIRATION RATE: 16 BRPM | OXYGEN SATURATION: 96 % | HEIGHT: 70 IN

## 2023-06-08 DIAGNOSIS — F41.1 GAD (GENERALIZED ANXIETY DISORDER): ICD-10-CM

## 2023-06-08 DIAGNOSIS — M79.89 LEG SWELLING: ICD-10-CM

## 2023-06-08 DIAGNOSIS — Z00.00 MEDICARE ANNUAL WELLNESS VISIT, SUBSEQUENT: Primary | ICD-10-CM

## 2023-06-08 DIAGNOSIS — F33.42 MAJOR DEPRESSIVE DISORDER, RECURRENT, IN FULL REMISSION (HCC): ICD-10-CM

## 2023-06-08 DIAGNOSIS — I34.0 NONRHEUMATIC MITRAL VALVE REGURGITATION: ICD-10-CM

## 2023-06-08 DIAGNOSIS — I42.0 DILATED CARDIOMYOPATHY (HCC): Primary | ICD-10-CM

## 2023-06-08 DIAGNOSIS — I44.7 LBBB (LEFT BUNDLE BRANCH BLOCK): ICD-10-CM

## 2023-06-08 DIAGNOSIS — I27.20 PULMONARY HTN (HCC): ICD-10-CM

## 2023-06-08 PROBLEM — G47.34 NOCTURNAL HYPOXIA: Status: ACTIVE | Noted: 2023-03-07

## 2023-06-08 PROCEDURE — 99213 OFFICE O/P EST LOW 20 MIN: CPT | Performed by: STUDENT IN AN ORGANIZED HEALTH CARE EDUCATION/TRAINING PROGRAM

## 2023-06-08 PROCEDURE — G8427 DOCREV CUR MEDS BY ELIG CLIN: HCPCS | Performed by: SPECIALIST

## 2023-06-08 PROCEDURE — G8427 DOCREV CUR MEDS BY ELIG CLIN: HCPCS | Performed by: STUDENT IN AN ORGANIZED HEALTH CARE EDUCATION/TRAINING PROGRAM

## 2023-06-08 PROCEDURE — 1123F ACP DISCUSS/DSCN MKR DOCD: CPT | Performed by: SPECIALIST

## 2023-06-08 PROCEDURE — G8420 CALC BMI NORM PARAMETERS: HCPCS | Performed by: STUDENT IN AN ORGANIZED HEALTH CARE EDUCATION/TRAINING PROGRAM

## 2023-06-08 PROCEDURE — 99213 OFFICE O/P EST LOW 20 MIN: CPT | Performed by: SPECIALIST

## 2023-06-08 PROCEDURE — G0439 PPPS, SUBSEQ VISIT: HCPCS | Performed by: STUDENT IN AN ORGANIZED HEALTH CARE EDUCATION/TRAINING PROGRAM

## 2023-06-08 PROCEDURE — 1036F TOBACCO NON-USER: CPT | Performed by: STUDENT IN AN ORGANIZED HEALTH CARE EDUCATION/TRAINING PROGRAM

## 2023-06-08 PROCEDURE — G8420 CALC BMI NORM PARAMETERS: HCPCS | Performed by: SPECIALIST

## 2023-06-08 PROCEDURE — 1123F ACP DISCUSS/DSCN MKR DOCD: CPT | Performed by: STUDENT IN AN ORGANIZED HEALTH CARE EDUCATION/TRAINING PROGRAM

## 2023-06-08 PROCEDURE — 1036F TOBACCO NON-USER: CPT | Performed by: SPECIALIST

## 2023-06-08 RX ORDER — BUSPIRONE HYDROCHLORIDE 5 MG/1
5 TABLET ORAL 2 TIMES DAILY
Qty: 60 TABLET | Refills: 3 | Status: SHIPPED | OUTPATIENT
Start: 2023-06-08 | End: 2024-06-07

## 2023-06-08 SDOH — ECONOMIC STABILITY: HOUSING INSECURITY
IN THE LAST 12 MONTHS, WAS THERE A TIME WHEN YOU DID NOT HAVE A STEADY PLACE TO SLEEP OR SLEPT IN A SHELTER (INCLUDING NOW)?: NO

## 2023-06-08 SDOH — ECONOMIC STABILITY: FOOD INSECURITY: WITHIN THE PAST 12 MONTHS, YOU WORRIED THAT YOUR FOOD WOULD RUN OUT BEFORE YOU GOT MONEY TO BUY MORE.: NEVER TRUE

## 2023-06-08 SDOH — ECONOMIC STABILITY: INCOME INSECURITY: HOW HARD IS IT FOR YOU TO PAY FOR THE VERY BASICS LIKE FOOD, HOUSING, MEDICAL CARE, AND HEATING?: SOMEWHAT HARD

## 2023-06-08 SDOH — ECONOMIC STABILITY: FOOD INSECURITY: WITHIN THE PAST 12 MONTHS, THE FOOD YOU BOUGHT JUST DIDN'T LAST AND YOU DIDN'T HAVE MONEY TO GET MORE.: NEVER TRUE

## 2023-06-08 ASSESSMENT — PATIENT HEALTH QUESTIONNAIRE - PHQ9
SUM OF ALL RESPONSES TO PHQ QUESTIONS 1-9: 19
10. IF YOU CHECKED OFF ANY PROBLEMS, HOW DIFFICULT HAVE THESE PROBLEMS MADE IT FOR YOU TO DO YOUR WORK, TAKE CARE OF THINGS AT HOME, OR GET ALONG WITH OTHER PEOPLE: 2
7. TROUBLE CONCENTRATING ON THINGS, SUCH AS READING THE NEWSPAPER OR WATCHING TELEVISION: 2
SUM OF ALL RESPONSES TO PHQ QUESTIONS 1-9: 23
SUM OF ALL RESPONSES TO PHQ QUESTIONS 1-9: 19
1. LITTLE INTEREST OR PLEASURE IN DOING THINGS: 3
SUM OF ALL RESPONSES TO PHQ QUESTIONS 1-9: 19
6. FEELING BAD ABOUT YOURSELF - OR THAT YOU ARE A FAILURE OR HAVE LET YOURSELF OR YOUR FAMILY DOWN: 3
SUM OF ALL RESPONSES TO PHQ9 QUESTIONS 1 & 2: 6
7. TROUBLE CONCENTRATING ON THINGS, SUCH AS READING THE NEWSPAPER OR WATCHING TELEVISION: 3
5. POOR APPETITE OR OVEREATING: 3
2. FEELING DOWN, DEPRESSED OR HOPELESS: 3
8. MOVING OR SPEAKING SO SLOWLY THAT OTHER PEOPLE COULD HAVE NOTICED. OR THE OPPOSITE, BEING SO FIGETY OR RESTLESS THAT YOU HAVE BEEN MOVING AROUND A LOT MORE THAN USUAL: 3
SUM OF ALL RESPONSES TO PHQ QUESTIONS 1-9: 23
SUM OF ALL RESPONSES TO PHQ QUESTIONS 1-9: 23
5. POOR APPETITE OR OVEREATING: 3
9. THOUGHTS THAT YOU WOULD BE BETTER OFF DEAD, OR OF HURTING YOURSELF: 0
2. FEELING DOWN, DEPRESSED OR HOPELESS: 3
3. TROUBLE FALLING OR STAYING ASLEEP: 1
SUM OF ALL RESPONSES TO PHQ QUESTIONS 1-9: 19
3. TROUBLE FALLING OR STAYING ASLEEP: 2
6. FEELING BAD ABOUT YOURSELF - OR THAT YOU ARE A FAILURE OR HAVE LET YOURSELF OR YOUR FAMILY DOWN: 2
9. THOUGHTS THAT YOU WOULD BE BETTER OFF DEAD, OR OF HURTING YOURSELF: 0
SUM OF ALL RESPONSES TO PHQ QUESTIONS 1-9: 23
4. FEELING TIRED OR HAVING LITTLE ENERGY: 3
SUM OF ALL RESPONSES TO PHQ9 QUESTIONS 1 & 2: 6
4. FEELING TIRED OR HAVING LITTLE ENERGY: 3
1. LITTLE INTEREST OR PLEASURE IN DOING THINGS: 3
8. MOVING OR SPEAKING SO SLOWLY THAT OTHER PEOPLE COULD HAVE NOTICED. OR THE OPPOSITE, BEING SO FIGETY OR RESTLESS THAT YOU HAVE BEEN MOVING AROUND A LOT MORE THAN USUAL: 2
10. IF YOU CHECKED OFF ANY PROBLEMS, HOW DIFFICULT HAVE THESE PROBLEMS MADE IT FOR YOU TO DO YOUR WORK, TAKE CARE OF THINGS AT HOME, OR GET ALONG WITH OTHER PEOPLE: 2

## 2023-06-08 ASSESSMENT — LIFESTYLE VARIABLES
HOW MANY STANDARD DRINKS CONTAINING ALCOHOL DO YOU HAVE ON A TYPICAL DAY: PATIENT DOES NOT DRINK
HOW OFTEN DO YOU HAVE A DRINK CONTAINING ALCOHOL: NEVER

## 2023-06-08 ASSESSMENT — COLUMBIA-SUICIDE SEVERITY RATING SCALE - C-SSRS
6. HAVE YOU EVER DONE ANYTHING, STARTED TO DO ANYTHING, OR PREPARED TO DO ANYTHING TO END YOUR LIFE?: NO
1. WITHIN THE PAST MONTH, HAVE YOU WISHED YOU WERE DEAD OR WISHED YOU COULD GO TO SLEEP AND NOT WAKE UP?: NO
2. HAVE YOU ACTUALLY HAD ANY THOUGHTS OF KILLING YOURSELF?: NO

## 2023-06-08 NOTE — PROGRESS NOTES
HDL 50 11/13/2020 09:36 AM    LDLCALC 37 05/17/2022 11:12 AM    LDLCALC 52.4 11/29/2021 10:59 AM    LDLCALC 58.6 11/13/2020 09:36 AM    TRIG 62 05/17/2022 11:12 AM    TRIG 98 11/29/2021 10:59 AM    TRIG 92 11/13/2020 09:36 AM       ASSESSMENT :      He is barely compensated at this point and certainly his quality of life is not what he would want at this point but I do not think we have a lot of options for improving his symptoms. I do think he should start on Eliquis. He has not had any recent falls and he does not have a higher than average bleeding risk given his age. He needs no further cardiac testing at this time since its not can change our management      current treatment plan is effective, no change in therapy  lab results and schedule of future lab studies reviewed with patient  reviewed diet, exercise and weight control     Encounter Diagnoses   Name Primary? Dilated cardiomyopathy (HCC) Yes    Pulmonary HTN (HCC)     Nonrheumatic mitral valve regurgitation     LBBB (left bundle branch block)          Future Appointments   Date Time Provider Mariela Oneal   9/15/2023  1:30 PM MD MICA Miner AMB   12/6/2023  2:00 PM [facility]        Flip Mueller MD  6/8/2023  Please note that this dictation was completed with [employer], the computer voice recognition software. Quite often unanticipated grammatical, syntax, homophones, and other interpretive errors are inadvertently transcribed by the computer software. Please disregard these errors. Please excuse any errors that have escaped final proofreading. Thank you.

## 2023-06-08 NOTE — PATIENT INSTRUCTIONS
home?  Keeping your body active can help slow MCI. Exercises like walking can help. Try to stay active mentally too. Read or do things like crossword puzzles if you enjoy doing them. If you need help coping with MCI, you may want to get support from family, friends, a support group, or a counselor who works with people who have [address] Though the future isn't always clear, it can be good to plan ahead with instructions for your care. These are called advanced directives. Having a plan can help make sure that you get the care you want. Current as of: August 25, 2022               Content Version: 13.6  © 2006-2023 [employer]. Care instructions adapted under license by [facility] ([facility]). If you have questions about a medical condition or this instruction, always ask your healthcare professional. [address] any warranty or liability for your use of this information. Recovering From Depression: Care Instructions  Your Care Instructions     Taking good care of yourself is important as you recover from depression. In time, your symptoms will fade as your treatment takes hold. Do not give up. Instead, focus your energy on getting better. Your mood will improve. It just takes some time. Focus on things that can help you feel better, such as being with friends and family, eating well, and getting enough rest. But take things slowly. Do not do too much too soon. You will begin to feel better gradually. Follow-up care is a key part of your treatment and safety. Be sure to make and go to all appointments, and call your doctor if you are having problems. It's also a good idea to know your test results and keep a list of the medicines you take. How can you care for yourself at home? Be realistic  If you have a large task to do, break it up into smaller steps you can handle, and just do what you can. You may want to put off important decisions until your depression has lifted.  If

## 2023-06-08 NOTE — PROGRESS NOTES
Medicare Annual Wellness Visit    Sherrie Stein is here for No chief complaint on file. Assessment & Plan   Medicare annual wellness visit, subsequent  - we discussed code status and what DNR means extensively. I recommended DNR due to his end stage heart failure. He will consider. He asks about life expectancy and hospice. We discussed what hospice care consists of. Wife may hire private care giver to help with bathing, etc.   RENE (generalized anxiety disorder)  Major depressive disorder, recurrent, in full remission ([facility]  - he is very anxious. He has been on benzodiazepines in the past but weaned off. I do not recommend restarting benzodiazepines for several reasons including age, side effect profile, sub-optimally treated sleep apnea. Advised to safely dispose of remaining clonazepam from years ago  - he also reports depressive symptoms, I suspect this is related to his chronic health conditions. - cont lexapro. Discussed possible cross-taper to zoloft in the future if still having high symptoms burden  - add buspar   - he declines therapy and referral to psychiatry  -     busPIRone (BUSPAR) 5 MG tablet; Take 1 tablet by mouth 2 times daily, Disp-60 tablet, R-3Normal  Leg swelling  - R leg much more swollen than L leg. This has been on and off for the last 1-2 years. No pain, erythema, calf tightness. No history of injury to the L leg but he has had bowel surgery, I wonder if this as impaired central blood return  - will obtain DVT US, however since leg swelling has been going on intermittently for quite some time the study is non-urgent.  Wife will call to schedule at a facility closer to home  -     Vascular duplex lower extremity venous right; Future    Recommendations for Preventive Services Due: see orders and patient instructions/AVS.  Recommended screening schedule for the next 5-10 years is provided to the patient in written form: see Patient Instructions/AVS.     Return in 3 months (on 9/8/2023) for

## 2023-06-22 ENCOUNTER — TELEPHONE (OUTPATIENT)
Age: 88
End: 2023-06-22

## 2023-06-22 NOTE — TELEPHONE ENCOUNTER
Called Ms. Haily Hirsch and notified her of message. She will take pt to a local doctor where they are and denied further questions or concerns.

## 2023-06-22 NOTE — TELEPHONE ENCOUNTER
Please see message below. Your note from 6/8 mentions the swelling and seems like diuretic is out of question.  Could something else be going on?

## 2023-06-22 NOTE — TELEPHONE ENCOUNTER
Pt's wife is calling because her  right leg is swollen and has red splotches on it and it looks like and inch bigger his left leg is swollen also. Pt has a hx of CHF. Pt's wife is looking for recommendations. Pt had the test done last Thursday and it showed no blood clots.     [phone]

## 2023-06-22 NOTE — TELEPHONE ENCOUNTER
MD Eric Reid, RN  Caller: Unspecified (Today, 12:46 PM)  If it has red splotches, could be infection and needs to be seen by primary care. Main thing is that they have ruled out a clot.  Heart failure does not cause assymetric leg swelling, so something else is going on with that leg

## 2023-06-23 NOTE — TELEPHONE ENCOUNTER
I wanted to provide an update to the cardiology team:    Mr Cole Garcia was seen at [facility] ED today for leg swelling and scrotal swelling. He reported worsening leg swelling, TAVERAS, orthopnea    Labs showed BNP 5000 and negative troponin x2. WBC normal.  CXR with BL pleural effusions and and pulmonary vascular congestion. BL DVT US negative. Pulse ox 88-89% on room air with ambulation. He was given 1mg IV bumex and recommended for admission for IV diuresis but he refused admission.      Marcellus Shah MD

## 2023-07-07 ENCOUNTER — TELEPHONE (OUTPATIENT)
Age: 88
End: 2023-07-07

## 2023-07-07 NOTE — TELEPHONE ENCOUNTER
Pt wife called and stated they was told to put on Hospice,stated she want to make sure this is a good decision from dr or nurse,please advise      [phone]

## 2023-07-07 NOTE — TELEPHONE ENCOUNTER
Called . Ramandeep Cueva. She said since calling us, Palliative Care has met with them to discuss further. She said they have pretty much decided to go forward with Hospice because it sounds like it will be helpful. Pt having difficulty bathing and getting around, they said someone could come help with those things. She did ask I let Dr. Ana Silveira know and if he disagreed for some reason to let them know. I said I would send him the message to him, but I am sure he will agree that whatever pt and her decide is best is what they should do. She said they will call back to cancel appt if needed. She denied further questions or concerns.

## 2023-09-05 ENCOUNTER — TELEPHONE (OUTPATIENT)
Age: 88
End: 2023-09-05

## 2023-09-05 NOTE — TELEPHONE ENCOUNTER
Please call wife and clarify if pt is still on hospice. If so, must cancel follow-up 9/15/23. Please wish him well from me.      Alyssa Rivas MD

## 2023-09-05 NOTE — TELEPHONE ENCOUNTER
Spoke with pt's wife Lauren Perez. Advised her Dr Cristobal Ward wanted to clarify if pt is still on hospice? She states he is. Advised her we will cancel follow-up 9/15/23. Wish them both well.

## 2023-12-06 ENCOUNTER — TELEPHONE (OUTPATIENT)
Age: 88
End: 2023-12-06

## 2023-12-06 NOTE — TELEPHONE ENCOUNTER
----- Message from Freddie Molina sent at 12/6/2023 12:45 PM EST -----  Subject: Message to Provider    QUESTIONS  Information for Provider? Patient's wife wanted to let Martha Workman know that   her  Roman Pugh has passed away.  ---------------------------------------------------------------------------  --------------  [facility] INFO  [phone]; OK to leave message on voicemail  ---------------------------------------------------------------------------  --------------  SCRIPT ANSWERS  Relationship to Patient? Spouse/Partner  Representative Name? Erickson Mejía  Is the representative on the Communication Release of Information (ASA)   form in Epic?  Yes
